# Patient Record
Sex: FEMALE | Race: WHITE | NOT HISPANIC OR LATINO | URBAN - METROPOLITAN AREA
[De-identification: names, ages, dates, MRNs, and addresses within clinical notes are randomized per-mention and may not be internally consistent; named-entity substitution may affect disease eponyms.]

---

## 2017-01-18 ENCOUNTER — OFFICE (AMBULATORY)
Dept: URBAN - METROPOLITAN AREA CLINIC 75 | Facility: CLINIC | Age: 67
End: 2017-01-18

## 2017-01-18 VITALS
WEIGHT: 160 LBS | DIASTOLIC BLOOD PRESSURE: 78 MMHG | HEIGHT: 63 IN | HEART RATE: 78 BPM | SYSTOLIC BLOOD PRESSURE: 124 MMHG

## 2017-01-18 DIAGNOSIS — K30 FUNCTIONAL DYSPEPSIA: ICD-10-CM

## 2017-01-18 DIAGNOSIS — K57.30 DIVERTICULOSIS OF LARGE INTESTINE WITHOUT PERFORATION OR ABS: ICD-10-CM

## 2017-01-18 DIAGNOSIS — K59.00 CONSTIPATION, UNSPECIFIED: ICD-10-CM

## 2017-01-18 DIAGNOSIS — D12.6 BENIGN NEOPLASM OF COLON, UNSPECIFIED: ICD-10-CM

## 2017-01-18 DIAGNOSIS — R93.3 ABNORMAL FINDINGS ON DIAGNOSTIC IMAGING OF OTHER PARTS OF DI: ICD-10-CM

## 2017-01-18 DIAGNOSIS — Z95.5 PRESENCE OF CORONARY ANGIOPLASTY IMPLANT AND GRAFT: ICD-10-CM

## 2017-01-18 DIAGNOSIS — Z79.82 LONG TERM (CURRENT) USE OF ASPIRIN: ICD-10-CM

## 2017-01-18 DIAGNOSIS — A04.9 BACTERIAL INTESTINAL INFECTION, UNSPECIFIED: ICD-10-CM

## 2017-01-18 DIAGNOSIS — M79.7 FIBROMYALGIA: ICD-10-CM

## 2017-01-18 DIAGNOSIS — R14.3 FLATULENCE: ICD-10-CM

## 2017-01-18 DIAGNOSIS — I25.10 ATHEROSCLEROTIC HEART DISEASE OF NATIVE CORONARY ARTERY WITH: ICD-10-CM

## 2017-01-18 PROCEDURE — 99213 OFFICE O/P EST LOW 20 MIN: CPT | Performed by: INTERNAL MEDICINE

## 2017-03-27 ENCOUNTER — OFFICE VISIT (OUTPATIENT)
Dept: CARDIOLOGY | Facility: CLINIC | Age: 67
End: 2017-03-27

## 2017-03-27 VITALS
WEIGHT: 161 LBS | BODY MASS INDEX: 27.49 KG/M2 | DIASTOLIC BLOOD PRESSURE: 89 MMHG | SYSTOLIC BLOOD PRESSURE: 133 MMHG | HEART RATE: 73 BPM | HEIGHT: 64 IN

## 2017-03-27 DIAGNOSIS — I25.10 CORONARY ARTERIOSCLEROSIS IN NATIVE ARTERY: ICD-10-CM

## 2017-03-27 DIAGNOSIS — R94.39 ABNORMAL STRESS ECG WITH TREADMILL: ICD-10-CM

## 2017-03-27 DIAGNOSIS — I10 BENIGN ESSENTIAL HYPERTENSION: Primary | ICD-10-CM

## 2017-03-27 PROCEDURE — 93000 ELECTROCARDIOGRAM COMPLETE: CPT | Performed by: INTERNAL MEDICINE

## 2017-03-27 RX ORDER — RANOLAZINE 500 MG/1
500 TABLET, EXTENDED RELEASE ORAL 2 TIMES DAILY
COMMUNITY
End: 2017-09-19 | Stop reason: SDUPTHER

## 2017-03-27 NOTE — PROGRESS NOTES
" Subjective:       Kristi Ibrahim is a 66 y.o. female who here for follow up    CC  CAD  HPI  66 his old white female with a history of the chest pain as well as benign essential arterial hypertension, does complains of tightness in chest on moderate to severe exertion, intermittent with relieved with rest lasting for a few minutes along with the weakness and shortness of breath     Problem List Items Addressed This Visit        Cardiovascular and Mediastinum    Benign essential hypertension - Primary    Coronary arteriosclerosis in native artery    Relevant Medications    ranolazine (RANEXA) 500 MG 12 hr tablet        Previous treatments/evaluations include: ASA. Cardiac risk factors: advanced age (older than 55 for men, 65 for women).    The following portions of the patient's history were reviewed and updated as appropriate: allergies, current medications, past family history, past medical history, past social history, past surgical history and problem list.    Past Medical History:   Diagnosis Date   • Coronary artery disease    • Hyperlipidemia    • Mitral valve insufficiency    • Tricuspid regurgitation     reports that she has never smoked. She has never used smokeless tobacco. She reports that she does not drink alcohol or use illicit drugs.History reviewed. No pertinent family history.    Review of Systems  Constitutional: No wt loss, fever, fatigue  Gastrointestinal: No nausea, abdominal pain  Behavioral/Psych: No insomnia or anxiety   Cardiovascular chest pain on moderate to severe exertion  Objective:       Physical Exam             Physical Exam  /89  Pulse 73  Ht 63.5\" (161.3 cm)  Wt 161 lb (73 kg)  BMI 28.07 kg/m2    General appearance: NAD, conversant   Eyes: anicteric sclerae, moist conjunctivae; no lid-lag; PERRLA   HENT: Atraumatic; oropharynx clear with moist mucous membranes and no mucosal ulcerations;  normal hard and soft palate   Neck: Trachea midline; FROM, supple, no thyromegaly or " lymphadenopathy   Lungs: CTA, with normal respiratory effort and no intercostal retractions   CV: S1-S2 regular, no murmurs, no rub, no gallop   Abdomen: Soft, non-tender; no masses or HSM   Extremities: No peripheral edema or extremity lymphadenopathy  Skin: Normal temperature, turgor and texture; no rash, ulcers or subcutaneous nodules   Psych: Appropriate affect, alert and oriented to person, place and time           Cardiographics  @  ECG 12 Lead  Date/Time: 3/27/2017 3:29 PM  Performed by: NAEL VERDIN  Authorized by: NAEL VERDIN   Comparison: compared with previous ECG   Similar to previous ECG  Rhythm: sinus rhythm  ST Flattening: all  Clinical impression: non-specific ECG            Echocardiogram:        Current Outpatient Prescriptions:   •  aspirin 81 MG tablet, Take  by mouth daily., Disp: , Rfl:   •  Calcium Carb-Cholecalciferol 600-200 MG-UNIT tablet, Take  by mouth., Disp: , Rfl:   •  Calcium Carb-Cholecalciferol 600-500 MG-UNIT capsule, , Disp: , Rfl:   •  ranolazine (RANEXA) 500 MG 12 hr tablet, Take 500 mg by mouth 2 (Two) Times a Day., Disp: , Rfl:   •  vitamin E 400 UNIT capsule, Take  by mouth., Disp: , Rfl:   •  zolpidem CR (AMBIEN CR) 12.5 MG CR tablet, Take 12.5 mg by mouth daily., Disp: , Rfl:   •  meclizine (ANTIVERT) 25 MG tablet, Take 25 mg by mouth daily., Disp: , Rfl:   •  nitroglycerin (NITROSTAT) 0.4 MG SL tablet, Place  under the tongue., Disp: , Rfl:   •  Omega 3 1000 MG capsule, Take  by mouth daily., Disp: , Rfl:    Assessment:        Patient Active Problem List   Diagnosis   • Abnormal cardiovascular stress test   • Benign essential hypertension   • Chest pain   • Coronary arteriosclerosis in native artery   • Presence of stent in coronary artery               Plan:         Clinically no signs and symptoms of angina or chf, no side effects with current meds,.    Continue current meds and treatment.    Importance of controlling hypertension and blood pressure   checkup on the regular basis has been explained  Hypertension as a silent killer has been discussed  Risk reduction of the weight and regular exercises to control the hypertension has been explained        ICD-10-CM ICD-9-CM   1. Benign essential hypertension I10 401.1   2. Coronary arteriosclerosis in native artery I25.10 414.01     CP WITH MODERATE TO HEAVY EXERTION    Considering the patient's symptoms as well as clinical situation and  EKG findings, along with cardiac risk factors, ischemic workup is necessary to rule out ischemic cardiomyopathy, stress induced arrhythmias, and functional capacity for diagnosis as well as prognostic consideration    Considering patient's medical condition as well as the risk factors, patient will require echocardiogram for further evaluation for the LV function, four-chamber evaluation, including the pressures, valvular function and  pericardial disease and pericardial effusion        SEE 1 YR  COUNSELING:    Kristi Reed was given to patient for the following topics: diagnostic results, risk factor reductions, impressions, risks and benefits of treatment options and importance of treatment compliance .       SMOKING COUNSELING:    Counseling given: Not Answered      EMR Dragon/Transcription disclaimer:   Much of this encounter note is an electronic transcription/translation of spoken language to printed text. The electronic translation of spoken language may permit erroneous, or at times, nonsensical words or phrases to be inadvertently transcribed; Although I have reviewed the note for such errors, some may still exist.

## 2017-05-15 ENCOUNTER — HOSPITAL ENCOUNTER (OUTPATIENT)
Dept: CARDIOLOGY | Facility: HOSPITAL | Age: 67
Discharge: HOME | End: 2017-05-15
Attending: INTERNAL MEDICINE | Admitting: INTERNAL MEDICINE

## 2017-05-15 ENCOUNTER — HOSPITAL ENCOUNTER (OUTPATIENT)
Dept: CARDIOLOGY | Facility: HOSPITAL | Age: 67
Discharge: HOME | End: 2017-05-15
Attending: INTERNAL MEDICINE

## 2017-05-15 VITALS
OXYGEN SATURATION: 98 % | RESPIRATION RATE: 16 BRPM | SYSTOLIC BLOOD PRESSURE: 128 MMHG | HEART RATE: 76 BPM | DIASTOLIC BLOOD PRESSURE: 80 MMHG

## 2017-05-15 VITALS
SYSTOLIC BLOOD PRESSURE: 127 MMHG | WEIGHT: 159.4 LBS | DIASTOLIC BLOOD PRESSURE: 78 MMHG | HEIGHT: 63 IN | HEART RATE: 80 BPM | BODY MASS INDEX: 28.24 KG/M2

## 2017-05-15 DIAGNOSIS — I25.10 CORONARY ARTERIOSCLEROSIS IN NATIVE ARTERY: ICD-10-CM

## 2017-05-15 DIAGNOSIS — I10 BENIGN ESSENTIAL HYPERTENSION: ICD-10-CM

## 2017-05-15 LAB
BH CV ECHO MEAS - ACS: 1.7 CM
BH CV ECHO MEAS - AO MAX PG (FULL): 2.4 MMHG
BH CV ECHO MEAS - AO MAX PG: 6.6 MMHG
BH CV ECHO MEAS - AO MEAN PG (FULL): 2 MMHG
BH CV ECHO MEAS - AO MEAN PG: 4 MMHG
BH CV ECHO MEAS - AO ROOT AREA (BSA CORRECTED): 1.6
BH CV ECHO MEAS - AO ROOT AREA: 6.2 CM^2
BH CV ECHO MEAS - AO ROOT DIAM: 2.8 CM
BH CV ECHO MEAS - AO V2 MAX: 128 CM/SEC
BH CV ECHO MEAS - AO V2 MEAN: 93.9 CM/SEC
BH CV ECHO MEAS - AO V2 VTI: 28.3 CM
BH CV ECHO MEAS - AVA(I,A): 2.2 CM^2
BH CV ECHO MEAS - AVA(I,D): 2.2 CM^2
BH CV ECHO MEAS - AVA(V,A): 2.3 CM^2
BH CV ECHO MEAS - AVA(V,D): 2.3 CM^2
BH CV ECHO MEAS - BSA(HAYCOCK): 1.8 M^2
BH CV ECHO MEAS - BSA: 1.8 M^2
BH CV ECHO MEAS - BZI_BMI: 28.2 KILOGRAMS/M^2
BH CV ECHO MEAS - BZI_DIASTOLIC_PRESSURE: 78 MMHG
BH CV ECHO MEAS - BZI_HEART_RATE: 80 BPM
BH CV ECHO MEAS - BZI_METRIC_HEIGHT: 160 CM
BH CV ECHO MEAS - BZI_METRIC_WEIGHT: 72.2 KG
BH CV ECHO MEAS - BZI_SYSTOLIC_PRESSURE: 127 MMHG
BH CV ECHO MEAS - CONTRAST EF 4CH: 62.3 ML/M^2
BH CV ECHO MEAS - EDV(CUBED): 85.2 ML
BH CV ECHO MEAS - EDV(MOD-SP4): 61 ML
BH CV ECHO MEAS - EDV(TEICH): 87.7 ML
BH CV ECHO MEAS - EF(CUBED): 64.3 %
BH CV ECHO MEAS - EF(MOD-SP4): 62.3 %
BH CV ECHO MEAS - EF(TEICH): 56.1 %
BH CV ECHO MEAS - ESV(CUBED): 30.4 ML
BH CV ECHO MEAS - ESV(MOD-SP4): 23 ML
BH CV ECHO MEAS - ESV(TEICH): 38.5 ML
BH CV ECHO MEAS - FS: 29.1 %
BH CV ECHO MEAS - IVS/LVPW: 0.92
BH CV ECHO MEAS - IVSD: 1.1 CM
BH CV ECHO MEAS - LA DIMENSION: 2.9 CM
BH CV ECHO MEAS - LA/AO: 1
BH CV ECHO MEAS - LAT PEAK E' VEL: 9 CM/SEC
BH CV ECHO MEAS - LV DIASTOLIC VOL/BSA (35-75): 34.8 ML/M^2
BH CV ECHO MEAS - LV MASS(C)D: 180 GRAMS
BH CV ECHO MEAS - LV MASS(C)DI: 102.5 GRAMS/M^2
BH CV ECHO MEAS - LV MAX PG: 4.2 MMHG
BH CV ECHO MEAS - LV MEAN PG: 2 MMHG
BH CV ECHO MEAS - LV SYSTOLIC VOL/BSA (12-30): 13.1 ML/M^2
BH CV ECHO MEAS - LV V1 MAX: 102 CM/SEC
BH CV ECHO MEAS - LV V1 MEAN: 72.9 CM/SEC
BH CV ECHO MEAS - LV V1 VTI: 21.8 CM
BH CV ECHO MEAS - LVIDD: 4.4 CM
BH CV ECHO MEAS - LVIDS: 3.1 CM
BH CV ECHO MEAS - LVLD AP4: 6.6 CM
BH CV ECHO MEAS - LVLS AP4: 5.7 CM
BH CV ECHO MEAS - LVOT AREA (M): 2.8 CM^2
BH CV ECHO MEAS - LVOT AREA: 2.8 CM^2
BH CV ECHO MEAS - LVOT DIAM: 1.9 CM
BH CV ECHO MEAS - LVPWD: 1.2 CM
BH CV ECHO MEAS - MED PEAK E' VEL: 6 CM/SEC
BH CV ECHO MEAS - MR MAX PG: 94.1 MMHG
BH CV ECHO MEAS - MR MAX VEL: 485 CM/SEC
BH CV ECHO MEAS - MR MEAN PG: 68 MMHG
BH CV ECHO MEAS - MR MEAN VEL: 391 CM/SEC
BH CV ECHO MEAS - MR VTI: 189 CM
BH CV ECHO MEAS - MV A DUR: 0.15 SEC
BH CV ECHO MEAS - MV A MAX VEL: 91.3 CM/SEC
BH CV ECHO MEAS - MV DEC SLOPE: 278 CM/SEC^2
BH CV ECHO MEAS - MV DEC TIME: 0.23 SEC
BH CV ECHO MEAS - MV E MAX VEL: 61.7 CM/SEC
BH CV ECHO MEAS - MV E/A: 0.68
BH CV ECHO MEAS - MV MAX PG: 3.4 MMHG
BH CV ECHO MEAS - MV MEAN PG: 1 MMHG
BH CV ECHO MEAS - MV P1/2T MAX VEL: 74 CM/SEC
BH CV ECHO MEAS - MV P1/2T: 78 MSEC
BH CV ECHO MEAS - MV V2 MAX: 92.5 CM/SEC
BH CV ECHO MEAS - MV V2 MEAN: 50.5 CM/SEC
BH CV ECHO MEAS - MV V2 VTI: 20.9 CM
BH CV ECHO MEAS - MVA P1/2T LCG: 3 CM^2
BH CV ECHO MEAS - MVA(P1/2T): 2.8 CM^2
BH CV ECHO MEAS - MVA(VTI): 3 CM^2
BH CV ECHO MEAS - PA MAX PG: 2.5 MMHG
BH CV ECHO MEAS - PA MEAN PG: 1 MMHG
BH CV ECHO MEAS - PA V2 MAX: 77.2 CM/SEC
BH CV ECHO MEAS - PA V2 MEAN: 42.1 CM/SEC
BH CV ECHO MEAS - PA V2 VTI: 13.3 CM
BH CV ECHO MEAS - PULM A REVS DUR: 0.11 SEC
BH CV ECHO MEAS - PULM A REVS VEL: 37.5 CM/SEC
BH CV ECHO MEAS - PULM DIAS VEL: 41 CM/SEC
BH CV ECHO MEAS - PULM S/D: 1.8
BH CV ECHO MEAS - PULM SYS VEL: 73.5 CM/SEC
BH CV ECHO MEAS - RAP SYSTOLE: 10 MMHG
BH CV ECHO MEAS - RVSP: 34.2 MMHG
BH CV ECHO MEAS - SI(AO): 99.3 ML/M^2
BH CV ECHO MEAS - SI(CUBED): 31.2 ML/M^2
BH CV ECHO MEAS - SI(LVOT): 35.2 ML/M^2
BH CV ECHO MEAS - SI(MOD-SP4): 21.6 ML/M^2
BH CV ECHO MEAS - SI(TEICH): 28 ML/M^2
BH CV ECHO MEAS - SV(AO): 174.3 ML
BH CV ECHO MEAS - SV(CUBED): 54.8 ML
BH CV ECHO MEAS - SV(LVOT): 61.8 ML
BH CV ECHO MEAS - SV(MOD-SP4): 38 ML
BH CV ECHO MEAS - SV(TEICH): 49.2 ML
BH CV ECHO MEAS - TAPSE (>1.6): 1.5 CM2
BH CV ECHO MEAS - TR MAX VEL: 246 CM/SEC
BH CV XLRA - RV BASE: 2.9 CM
BH CV XLRA - RV LENGTH: 5.9 CM
BH CV XLRA - RV MID: 2.9 CM
E/E' RATIO: 9
LEFT ATRIUM VOLUME INDEX: 29 ML/M2

## 2017-05-15 PROCEDURE — 93306 TTE W/DOPPLER COMPLETE: CPT | Performed by: INTERNAL MEDICINE

## 2017-05-15 PROCEDURE — 93018 CV STRESS TEST I&R ONLY: CPT | Performed by: INTERNAL MEDICINE

## 2017-05-15 PROCEDURE — 93017 CV STRESS TEST TRACING ONLY: CPT

## 2017-05-15 PROCEDURE — 93306 TTE W/DOPPLER COMPLETE: CPT

## 2017-05-15 PROCEDURE — 93016 CV STRESS TEST SUPVJ ONLY: CPT | Performed by: INTERNAL MEDICINE

## 2017-05-16 LAB
BH CV STRESS BP STAGE 1: NORMAL
BH CV STRESS BP STAGE 2: NORMAL
BH CV STRESS BP STAGE 3: NORMAL
BH CV STRESS BP STAGE 4: NORMAL
BH CV STRESS BP STAGE 5: NORMAL
BH CV STRESS DURATION MIN STAGE 1: 3
BH CV STRESS DURATION MIN STAGE 2: 3
BH CV STRESS DURATION MIN STAGE 3: 1
BH CV STRESS DURATION MIN STAGE 4: 2
BH CV STRESS DURATION MIN STAGE 5: 1
BH CV STRESS DURATION SEC STAGE 1: 0
BH CV STRESS DURATION SEC STAGE 2: 0
BH CV STRESS DURATION SEC STAGE 3: 34
BH CV STRESS DURATION SEC STAGE 4: 30
BH CV STRESS DURATION SEC STAGE 5: 14
BH CV STRESS GRADE STAGE 1: 0
BH CV STRESS GRADE STAGE 2: 5
BH CV STRESS GRADE STAGE 3: 10
BH CV STRESS GRADE STAGE 4: 10
BH CV STRESS GRADE STAGE 5: 10
BH CV STRESS HR STAGE 1: 103
BH CV STRESS HR STAGE 2: 1110
BH CV STRESS HR STAGE 3: 123
BH CV STRESS HR STAGE 4: 133
BH CV STRESS HR STAGE 5: 144
BH CV STRESS METS STAGE 1: 2.3
BH CV STRESS METS STAGE 2: 3.4
BH CV STRESS METS STAGE 3: 4.5
BH CV STRESS METS STAGE 4: 6.3
BH CV STRESS METS STAGE 5: 7.1
BH CV STRESS O2 STAGE 4: 98
BH CV STRESS O2 STAGE 5: 98
BH CV STRESS PROTOCOL 1: NORMAL
BH CV STRESS RECOVERY BP: NORMAL MMHG
BH CV STRESS RECOVERY HR: 88 BPM
BH CV STRESS RECOVERY O2: 98 %
BH CV STRESS SPEED STAGE 1: 1.7
BH CV STRESS SPEED STAGE 2: 1.7
BH CV STRESS SPEED STAGE 3: 1.7
BH CV STRESS SPEED STAGE 4: 2.5
BH CV STRESS SPEED STAGE 5: 2.9
BH CV STRESS STAGE 1: 0
BH CV STRESS STAGE 2: NORMAL
BH CV STRESS STAGE 3: 1
BH CV STRESS STAGE 4: 2
BH CV STRESS STAGE 5: 3
MAXIMAL PREDICTED HEART RATE: 154 BPM
PERCENT MAX PREDICTED HR: 93.51 %
STRESS BASELINE BP: NORMAL MMHG
STRESS BASELINE HR: 85 BPM
STRESS O2 SAT REST: 98 %
STRESS PERCENT HR: 110 %
STRESS POST ESTIMATED WORKLOAD: 7.1 METS
STRESS POST EXERCISE DUR MIN: 11 MIN
STRESS POST EXERCISE DUR SEC: 44 SEC
STRESS POST O2 SAT PEAK: 98 %
STRESS POST PEAK BP: NORMAL MMHG
STRESS POST PEAK HR: 144 BPM
STRESS TARGET HR: 131 BPM

## 2017-05-18 RX ORDER — NITROGLYCERIN 0.4 MG/1
0.4 TABLET SUBLINGUAL
Qty: 25 TABLET | Refills: 5 | Status: SHIPPED | OUTPATIENT
Start: 2017-05-18

## 2017-06-19 ENCOUNTER — HOSPITAL ENCOUNTER (OUTPATIENT)
Dept: CARDIOLOGY | Facility: HOSPITAL | Age: 67
Discharge: HOME | End: 2017-06-19
Attending: INTERNAL MEDICINE

## 2017-06-19 VITALS
HEART RATE: 70 BPM | BODY MASS INDEX: 27.49 KG/M2 | WEIGHT: 161 LBS | HEIGHT: 64 IN | DIASTOLIC BLOOD PRESSURE: 70 MMHG | SYSTOLIC BLOOD PRESSURE: 120 MMHG

## 2017-06-19 DIAGNOSIS — I10 BENIGN ESSENTIAL HYPERTENSION: ICD-10-CM

## 2017-06-19 DIAGNOSIS — I25.10 CORONARY ARTERIOSCLEROSIS IN NATIVE ARTERY: ICD-10-CM

## 2017-06-19 DIAGNOSIS — R94.39 ABNORMAL STRESS ECG WITH TREADMILL: ICD-10-CM

## 2017-06-19 LAB
BH CV STRESS BP STAGE 1: NORMAL
BH CV STRESS BP STAGE 2: NORMAL
BH CV STRESS BP STAGE 3: NORMAL
BH CV STRESS BP STAGE 4: NORMAL
BH CV STRESS DURATION MIN STAGE 1: 3
BH CV STRESS DURATION MIN STAGE 2: 3
BH CV STRESS DURATION MIN STAGE 3: 3
BH CV STRESS DURATION MIN STAGE 4: 3
BH CV STRESS DURATION SEC STAGE 1: 0
BH CV STRESS DURATION SEC STAGE 2: 0
BH CV STRESS DURATION SEC STAGE 3: 0
BH CV STRESS DURATION SEC STAGE 4: 0
BH CV STRESS GRADE STAGE 1: 0
BH CV STRESS GRADE STAGE 2: 5
BH CV STRESS GRADE STAGE 3: 10
BH CV STRESS GRADE STAGE 4: 12
BH CV STRESS HR STAGE 1: 101
BH CV STRESS HR STAGE 2: 114
BH CV STRESS HR STAGE 3: 127
BH CV STRESS HR STAGE 4: 139
BH CV STRESS METS STAGE 1: 2.3
BH CV STRESS METS STAGE 2: 3.5
BH CV STRESS METS STAGE 3: 4.6
BH CV STRESS METS STAGE 4: 7.1
BH CV STRESS PROTOCOL 1: NORMAL
BH CV STRESS RECOVERY BP: NORMAL MMHG
BH CV STRESS RECOVERY HR: 88 BPM
BH CV STRESS SPEED STAGE 1: 1.7
BH CV STRESS SPEED STAGE 2: 1.7
BH CV STRESS SPEED STAGE 3: 1.7
BH CV STRESS SPEED STAGE 4: 1.7
BH CV STRESS STAGE 1: 1
BH CV STRESS STAGE 2: 2
BH CV STRESS STAGE 3: 3
BH CV STRESS STAGE 4: 4
BH CV STRESS STAGE 5: 5
LV EF NUC BP: 75 %
MAXIMAL PREDICTED HEART RATE: 153 BPM
PERCENT MAX PREDICTED HR: 91.5 %
STRESS BASELINE BP: NORMAL MMHG
STRESS BASELINE HR: 73 BPM
STRESS PERCENT HR: 108 %
STRESS POST ESTIMATED WORKLOAD: 7.2 METS
STRESS POST EXERCISE DUR MIN: 12 MIN
STRESS POST EXERCISE DUR SEC: 0 SEC
STRESS POST PEAK BP: NORMAL MMHG
STRESS POST PEAK HR: 140 BPM
STRESS TARGET HR: 130 BPM

## 2017-06-19 PROCEDURE — 0 TECHNETIUM SESTAMIBI: Performed by: INTERNAL MEDICINE

## 2017-06-19 PROCEDURE — 93017 CV STRESS TEST TRACING ONLY: CPT

## 2017-06-19 PROCEDURE — 93018 CV STRESS TEST I&R ONLY: CPT | Performed by: INTERNAL MEDICINE

## 2017-06-19 PROCEDURE — 78452 HT MUSCLE IMAGE SPECT MULT: CPT

## 2017-06-19 PROCEDURE — A9500 TC99M SESTAMIBI: HCPCS | Performed by: INTERNAL MEDICINE

## 2017-06-19 PROCEDURE — 93016 CV STRESS TEST SUPVJ ONLY: CPT | Performed by: INTERNAL MEDICINE

## 2017-06-19 PROCEDURE — 78452 HT MUSCLE IMAGE SPECT MULT: CPT | Performed by: INTERNAL MEDICINE

## 2017-06-19 RX ADMIN — Medication 1 DOSE: at 07:34

## 2017-06-19 RX ADMIN — Medication 1 DOSE: at 09:04

## 2017-07-03 ENCOUNTER — OFFICE VISIT (OUTPATIENT)
Dept: CARDIOLOGY | Facility: CLINIC | Age: 67
End: 2017-07-03

## 2017-07-03 VITALS
HEART RATE: 72 BPM | BODY MASS INDEX: 27.31 KG/M2 | HEIGHT: 64 IN | DIASTOLIC BLOOD PRESSURE: 83 MMHG | SYSTOLIC BLOOD PRESSURE: 123 MMHG | WEIGHT: 160 LBS

## 2017-07-03 DIAGNOSIS — I10 BENIGN ESSENTIAL HYPERTENSION: Primary | ICD-10-CM

## 2017-07-03 DIAGNOSIS — I25.10 CORONARY ARTERIOSCLEROSIS IN NATIVE ARTERY: ICD-10-CM

## 2017-07-03 PROCEDURE — 99212 OFFICE O/P EST SF 10 MIN: CPT | Performed by: INTERNAL MEDICINE

## 2017-07-03 NOTE — PROGRESS NOTES
" Subjective:       Kristi Ibrahim is a 67 y.o. female who here for follow up    CC  CAD,   HPI  67-year-old female who is here for follow-up after the stress test and echocardiogram done last month denies any chest pains or tightness in chest no heaviness with a pressure sensation     Problem List Items Addressed This Visit        Cardiovascular and Mediastinum    Benign essential hypertension - Primary    Coronary arteriosclerosis in native artery        .    The following portions of the patient's history were reviewed and updated as appropriate: allergies, current medications, past family history, past medical history, past social history, past surgical history and problem list.    Past Medical History:   Diagnosis Date   • Coronary artery disease    • Hyperlipidemia    • Mitral valve insufficiency    • Tricuspid regurgitation     reports that she has never smoked. She has never used smokeless tobacco. She reports that she does not drink alcohol or use illicit drugs.  Family History   Problem Relation Age of Onset   • Heart disease Mother    • Stroke Mother    • Heart disease Father    • Hypertension Father    • Heart disease Brother    • Hypertension Brother        Review of Systems  Constitutional: No wt loss, fever, fatigue  Gastrointestinal: No nausea, abdominal pain  Behavioral/Psych: No insomnia or anxiety   Cardiovascular No chest pains or tightness in chest  Objective:       Physical Exam             Physical Exam  /83  Pulse 72  Ht 63.5\" (161.3 cm)  Wt 160 lb (72.6 kg)  BMI 27.9 kg/m2    General appearance: NAD, conversant   Eyes: anicteric sclerae, moist conjunctivae; no lid-lag; PERRLA   HENT: Atraumatic; oropharynx clear with moist mucous membranes and no mucosal ulcerations;  normal hard and soft palate   Neck: Trachea midline; FROM, supple, no thyromegaly or lymphadenopathy   Lungs: CTA, with normal respiratory effort and no intercostal retractions   CV: S1-S2 regular, no murmurs, no rub, no " gallop   Abdomen: Soft, non-tender; no masses or HSM   Extremities: No peripheral edema or extremity lymphadenopathy  Skin: Normal temperature, turgor and texture; no rash, ulcers or subcutaneous nodules   Psych: Appropriate affect, alert and oriented to person, place and time           Cardiographics  @Procedures    Echocardiogram:        Current Outpatient Prescriptions:   •  aspirin 81 MG tablet, Take  by mouth daily., Disp: , Rfl:   •  Calcium Carb-Cholecalciferol 600-200 MG-UNIT tablet, Take  by mouth., Disp: , Rfl:   •  meclizine (ANTIVERT) 25 MG tablet, Take 25 mg by mouth daily., Disp: , Rfl:   •  nitroglycerin (NITROSTAT) 0.4 MG SL tablet, Place 1 tablet under the tongue Every 5 (Five) Minutes As Needed for Chest Pain., Disp: 25 tablet, Rfl: 5  •  Omega 3 1000 MG capsule, Take  by mouth daily., Disp: , Rfl:   •  ranolazine (RANEXA) 500 MG 12 hr tablet, Take 500 mg by mouth 2 (Two) Times a Day., Disp: , Rfl:   •  vitamin E 400 UNIT capsule, Take  by mouth., Disp: , Rfl:   •  zolpidem CR (AMBIEN CR) 12.5 MG CR tablet, Take 12.5 mg by mouth daily., Disp: , Rfl:    Assessment:        Patient Active Problem List   Diagnosis   • Abnormal cardiovascular stress test   • Benign essential hypertension   • Chest pain   • Coronary arteriosclerosis in native artery   • Presence of stent in coronary artery     Interpretation Summary   · Findings consistent with an abnormal ECG stress test.  · Left ventricular ejection fraction is hyperdynamic (Calculated EF > 70%).  · Myocardial perfusion imaging indicates a normal myocardial perfusion study with no evidence of ischemia.  · Impressions are consistent with a low risk study.               Plan:            ICD-10-CM ICD-9-CM   1. Benign essential hypertension I10 401.1   2. Coronary arteriosclerosis in native artery I25.10 414.01     1. Benign essential hypertension  Well-controlled with current medications    2. Coronary arteriosclerosis in native artery  *Alert was  negative    STRESS TEST OK    SEE US 1 YR  COUNSELING:    Kristi Reed was given to patient for the following topics: diagnostic results, risk factor reductions, impressions, risks and benefits of treatment options and importance of treatment compliance .       SMOKING COUNSELING:    Counseling given: Not Answered      EMR Dragon/Transcription disclaimer:   Much of this encounter note is an electronic transcription/translation of spoken language to printed text. The electronic translation of spoken language may permit erroneous, or at times, nonsensical words or phrases to be inadvertently transcribed; Although I have reviewed the note for such errors, some may still exist.

## 2017-09-07 VITALS
OXYGEN SATURATION: 99 % | OXYGEN SATURATION: 97 % | SYSTOLIC BLOOD PRESSURE: 137 MMHG | DIASTOLIC BLOOD PRESSURE: 56 MMHG | HEART RATE: 71 BPM | HEART RATE: 82 BPM | SYSTOLIC BLOOD PRESSURE: 100 MMHG | DIASTOLIC BLOOD PRESSURE: 66 MMHG | HEART RATE: 77 BPM | OXYGEN SATURATION: 98 % | RESPIRATION RATE: 18 BRPM | DIASTOLIC BLOOD PRESSURE: 75 MMHG | DIASTOLIC BLOOD PRESSURE: 79 MMHG | HEART RATE: 79 BPM | HEIGHT: 63 IN | WEIGHT: 160 LBS | RESPIRATION RATE: 16 BRPM | DIASTOLIC BLOOD PRESSURE: 57 MMHG | SYSTOLIC BLOOD PRESSURE: 74 MMHG | RESPIRATION RATE: 10 BRPM | TEMPERATURE: 97.9 F | DIASTOLIC BLOOD PRESSURE: 73 MMHG | OXYGEN SATURATION: 96 % | HEART RATE: 84 BPM | RESPIRATION RATE: 11 BRPM | SYSTOLIC BLOOD PRESSURE: 92 MMHG | HEART RATE: 74 BPM | DIASTOLIC BLOOD PRESSURE: 63 MMHG | DIASTOLIC BLOOD PRESSURE: 46 MMHG | DIASTOLIC BLOOD PRESSURE: 47 MMHG | SYSTOLIC BLOOD PRESSURE: 124 MMHG | SYSTOLIC BLOOD PRESSURE: 81 MMHG | RESPIRATION RATE: 20 BRPM | SYSTOLIC BLOOD PRESSURE: 116 MMHG | HEART RATE: 78 BPM | SYSTOLIC BLOOD PRESSURE: 96 MMHG | OXYGEN SATURATION: 95 % | SYSTOLIC BLOOD PRESSURE: 95 MMHG | DIASTOLIC BLOOD PRESSURE: 39 MMHG | RESPIRATION RATE: 13 BRPM | OXYGEN SATURATION: 100 % | SYSTOLIC BLOOD PRESSURE: 80 MMHG | TEMPERATURE: 98.2 F | SYSTOLIC BLOOD PRESSURE: 126 MMHG | DIASTOLIC BLOOD PRESSURE: 55 MMHG

## 2017-09-11 ENCOUNTER — OFFICE (AMBULATORY)
Dept: URBAN - METROPOLITAN AREA CLINIC 64 | Facility: CLINIC | Age: 67
End: 2017-09-11

## 2017-09-11 ENCOUNTER — AMBULATORY SURGICAL CENTER (AMBULATORY)
Dept: URBAN - METROPOLITAN AREA SURGERY 17 | Facility: SURGERY | Age: 67
End: 2017-09-11
Payer: COMMERCIAL

## 2017-09-11 DIAGNOSIS — D12.2 BENIGN NEOPLASM OF ASCENDING COLON: ICD-10-CM

## 2017-09-11 DIAGNOSIS — K63.89 OTHER SPECIFIED DISEASES OF INTESTINE: ICD-10-CM

## 2017-09-11 DIAGNOSIS — D12.3 BENIGN NEOPLASM OF TRANSVERSE COLON: ICD-10-CM

## 2017-09-11 DIAGNOSIS — Z86.010 PERSONAL HISTORY OF COLONIC POLYPS: ICD-10-CM

## 2017-09-11 DIAGNOSIS — K57.30 DIVERTICULOSIS OF LARGE INTESTINE WITHOUT PERFORATION OR ABS: ICD-10-CM

## 2017-09-11 LAB
GI HISTOLOGY: A. UNSPECIFIED: (no result)
GI HISTOLOGY: B. UNSPECIFIED: (no result)
GI HISTOLOGY: PDF REPORT: (no result)

## 2017-09-11 PROCEDURE — 45380 COLONOSCOPY AND BIOPSY: CPT | Mod: PT | Performed by: INTERNAL MEDICINE

## 2017-09-11 PROCEDURE — 88305 TISSUE EXAM BY PATHOLOGIST: CPT | Performed by: INTERNAL MEDICINE

## 2017-09-11 RX ADMIN — PROPOFOL 100 MG: 10 INJECTION, EMULSION INTRAVENOUS at 09:06

## 2017-09-11 RX ADMIN — LIDOCAINE HYDROCHLORIDE 25 MG: 10 INJECTION, SOLUTION EPIDURAL; INFILTRATION; INTRACAUDAL; PERINEURAL at 09:06

## 2017-09-11 RX ADMIN — PROPOFOL 25 MG: 10 INJECTION, EMULSION INTRAVENOUS at 09:26

## 2017-09-11 RX ADMIN — PROPOFOL 50 MG: 10 INJECTION, EMULSION INTRAVENOUS at 09:10

## 2017-09-11 RX ADMIN — PROPOFOL 25 MG: 10 INJECTION, EMULSION INTRAVENOUS at 09:15

## 2017-09-11 RX ADMIN — PROPOFOL 50 MG: 10 INJECTION, EMULSION INTRAVENOUS at 09:21

## 2017-09-19 RX ORDER — RANOLAZINE 500 MG/1
500 TABLET, EXTENDED RELEASE ORAL 2 TIMES DAILY
Qty: 180 TABLET | Refills: 1 | Status: SHIPPED | OUTPATIENT
Start: 2017-09-19 | End: 2017-09-20 | Stop reason: SDUPTHER

## 2017-09-20 RX ORDER — RANOLAZINE 500 MG/1
500 TABLET, EXTENDED RELEASE ORAL 2 TIMES DAILY
Qty: 180 TABLET | Refills: 1 | Status: SHIPPED | OUTPATIENT
Start: 2017-09-20 | End: 2018-02-26 | Stop reason: SDUPTHER

## 2018-02-26 RX ORDER — RANOLAZINE 500 MG/1
TABLET, FILM COATED, EXTENDED RELEASE ORAL
Qty: 180 TABLET | Refills: 1 | Status: SHIPPED | OUTPATIENT
Start: 2018-02-26 | End: 2018-04-02 | Stop reason: ALTCHOICE

## 2018-04-02 ENCOUNTER — OFFICE VISIT (OUTPATIENT)
Dept: CARDIOLOGY | Facility: CLINIC | Age: 68
End: 2018-04-02

## 2018-04-02 VITALS
WEIGHT: 166 LBS | DIASTOLIC BLOOD PRESSURE: 84 MMHG | HEIGHT: 64 IN | SYSTOLIC BLOOD PRESSURE: 130 MMHG | HEART RATE: 84 BPM | BODY MASS INDEX: 28.34 KG/M2

## 2018-04-02 DIAGNOSIS — I25.10 CORONARY ARTERIOSCLEROSIS IN NATIVE ARTERY: ICD-10-CM

## 2018-04-02 DIAGNOSIS — I10 BENIGN ESSENTIAL HYPERTENSION: ICD-10-CM

## 2018-04-02 DIAGNOSIS — R07.2 PRECORDIAL PAIN: Primary | ICD-10-CM

## 2018-04-02 PROCEDURE — 93000 ELECTROCARDIOGRAM COMPLETE: CPT | Performed by: INTERNAL MEDICINE

## 2018-04-02 PROCEDURE — 99214 OFFICE O/P EST MOD 30 MIN: CPT | Performed by: INTERNAL MEDICINE

## 2018-04-02 RX ORDER — BISOPROLOL FUMARATE AND HYDROCHLOROTHIAZIDE 5; 6.25 MG/1; MG/1
1 TABLET ORAL DAILY
Qty: 30 TABLET | Refills: 6 | Status: SHIPPED | OUTPATIENT
Start: 2018-04-02 | End: 2018-04-16 | Stop reason: SDUPTHER

## 2018-04-02 NOTE — PROGRESS NOTES
" Subjective:       Kristi Ibrahim is a 67 y.o. female who here for follow up    CC  SHARP PAIN CP, WITH SOB, FATIGUE  ON EXERCISE, GRADUALLY WORSENING    CHEST TIGHTNESS  HPI  67-year-old female with known history of hypertension coronary artery disease has been complaining of sharp pain in chest, moderate in intensity associated with the shortness of breath fatigue usually on exercise gradually worsening     Problem List Items Addressed This Visit        Cardiovascular and Mediastinum    Benign essential hypertension    Relevant Medications    bisoprolol-hydrochlorothiazide (ZIAC) 5-6.25 MG per tablet    Coronary arteriosclerosis in native artery       Nervous and Auditory    Chest pain - Primary    Relevant Orders    ECG 12 Lead    Treadmill Stress Test      Other Visit Diagnoses    None.       .    The following portions of the patient's history were reviewed and updated as appropriate: allergies, current medications, past family history, past medical history, past social history, past surgical history and problem list.    Past Medical History:   Diagnosis Date   • Coronary artery disease    • Hyperlipidemia    • Mitral valve insufficiency    • Tricuspid regurgitation     reports that she has never smoked. She has never used smokeless tobacco. She reports that she does not drink alcohol or use drugs.  Family History   Problem Relation Age of Onset   • Heart disease Mother    • Stroke Mother    • Heart disease Father    • Hypertension Father    • Heart disease Brother    • Hypertension Brother        Review of Systems  Constitutional: No wt loss, fever, fatigue  Gastrointestinal: No nausea, abdominal pain  Behavioral/Psych: No insomnia or anxiety   Cardiovascular Chest pain  Objective:       Physical Exam           Physical Exam  /84   Pulse 84   Ht 161.3 cm (63.5\")   Wt 75.3 kg (166 lb)   BMI 28.94 kg/m²     General appearance: NAD, conversant   Eyes: anicteric sclerae, moist conjunctivae; no lid-lag; " PERRLA   HENT: Atraumatic; oropharynx clear with moist mucous membranes and no mucosal ulcerations;  normal hard and soft palate   Neck: Trachea midline; FROM, supple, no thyromegaly or lymphadenopathy   Lungs: CTA, with normal respiratory effort and no intercostal retractions   CV: S1-S2 regular, no murmurs, no rub, no gallop   Abdomen: Soft, non-tender; no masses or HSM   Extremities: No peripheral edema or extremity lymphadenopathy  Skin: Normal temperature, turgor and texture; no rash, ulcers or subcutaneous nodules   Psych: Appropriate affect, alert and oriented to person, place and time           Cardiographics  @  ECG 12 Lead  Date/Time: 4/2/2018 1:24 PM  Performed by: NAEL VERDIN  Authorized by: NAEL VERDIN   Comparison: compared with previous ECG   Similar to previous ECG  Rhythm: sinus rhythm  ST Flattening: all  Clinical impression: non-specific ECG            Echocardiogram:        Current Outpatient Prescriptions:   •  aspirin 81 MG tablet, Take  by mouth daily., Disp: , Rfl:   •  Calcium Carb-Cholecalciferol 600-200 MG-UNIT tablet, Take  by mouth., Disp: , Rfl:   •  meclizine (ANTIVERT) 25 MG tablet, Take 25 mg by mouth daily., Disp: , Rfl:   •  Omega 3 1000 MG capsule, Take  by mouth daily., Disp: , Rfl:   •  RANEXA 500 MG 12 hr tablet, TAKE 1 TABLET TWICE A DAY, Disp: 180 tablet, Rfl: 1  •  vitamin E 400 UNIT capsule, Take  by mouth., Disp: , Rfl:   •  zolpidem CR (AMBIEN CR) 12.5 MG CR tablet, Take 12.5 mg by mouth daily., Disp: , Rfl:   •  nitroglycerin (NITROSTAT) 0.4 MG SL tablet, Place 1 tablet under the tongue Every 5 (Five) Minutes As Needed for Chest Pain., Disp: 25 tablet, Rfl: 5   Assessment:        Patient Active Problem List   Diagnosis   • Abnormal cardiovascular stress test   • Benign essential hypertension   • Chest pain   • Coronary arteriosclerosis in native artery   • Presence of stent in coronary artery     IMPRESSION OF HEART CATHETERIZATION:    1. Normal left  main coronary artery.  2. Normal left anterior descending artery and its branches with proximal   stent widely patent  3. Normal left circumflex artery and its branches.  4. Normal right coronary artery.  5. Normal left ventricular systolic function.  LVEDP 10 mmHg.  Ejection   fraction 60 %.    RECOMMENDATION:  Considering LAD stent widely patent with no other   coronary artery stenosis patient will be treated medically.   Result Narrative               Plan:            ICD-10-CM ICD-9-CM   1. Precordial pain R07.2 786.51   2. Coronary arteriosclerosis in native artery I25.10 414.01   3. Benign essential hypertension I10 401.1     1. Precordial pain  Considering the patient's symptoms as well as clinical situation and  EKG findings, along with cardiac risk factors, ischemic workup is necessary to rule out ischemic cardiomyopathy, stress induced arrhythmias, and functional capacity for diagnosis as well as prognostic consideration    Considering patient's medical condition as well as the risk factors, patient will require echocardiogram for further evaluation for the LV function, four-chamber evaluation, including the pressures, valvular function and  pericardial disease and pericardial effusion    - ECG 12 Lead  - Treadmill Stress Test    2. Coronary arteriosclerosis in native artery      3. Benign essential hypertension  Blood pressure remains high will add beta blockers       DC RENEXA    START ZIAC 5/ 6.25 MG PO DAILY    Pros and cons of this new medication / change medication has been explained to  the patient    Possible side effects has been explained    Associated need of the blood  Work has been explained    Need for the compliance of the medication has been explained      SEE IN 2 WKS WITH ETT  COUNSELING:    Kristi Reed was given to patient for the following topics: diagnostic results, risk factor reductions, impressions, risks and benefits of treatment options and importance of treatment  compliance .       SMOKING COUNSELING:    Counseling given: Not Answered      EMR Dragon/Transcription disclaimer:   Much of this encounter note is an electronic transcription/translation of spoken language to printed text. The electronic translation of spoken language may permit erroneous, or at times, nonsensical words or phrases to be inadvertently transcribed; Although I have reviewed the note for such errors, some may still exist.

## 2018-04-16 ENCOUNTER — HOSPITAL ENCOUNTER (OUTPATIENT)
Dept: CARDIOLOGY | Facility: HOSPITAL | Age: 68
Discharge: HOME | End: 2018-04-16

## 2018-04-16 ENCOUNTER — HOSPITAL ENCOUNTER (OUTPATIENT)
Dept: CARDIOLOGY | Facility: HOSPITAL | Age: 68
Discharge: HOME | End: 2018-04-16
Attending: INTERNAL MEDICINE | Admitting: INTERNAL MEDICINE

## 2018-04-16 ENCOUNTER — OFFICE VISIT (OUTPATIENT)
Dept: CARDIOLOGY | Facility: CLINIC | Age: 68
End: 2018-04-16

## 2018-04-16 VITALS
OXYGEN SATURATION: 96 % | BODY MASS INDEX: 28.17 KG/M2 | HEART RATE: 75 BPM | SYSTOLIC BLOOD PRESSURE: 93 MMHG | DIASTOLIC BLOOD PRESSURE: 62 MMHG | WEIGHT: 165 LBS | HEIGHT: 64 IN

## 2018-04-16 VITALS — SYSTOLIC BLOOD PRESSURE: 104 MMHG | DIASTOLIC BLOOD PRESSURE: 80 MMHG

## 2018-04-16 DIAGNOSIS — Z95.5 PRESENCE OF STENT IN CORONARY ARTERY: ICD-10-CM

## 2018-04-16 DIAGNOSIS — R07.2 PRECORDIAL PAIN: ICD-10-CM

## 2018-04-16 DIAGNOSIS — R94.39 ABNORMAL CARDIOVASCULAR STRESS TEST: Primary | ICD-10-CM

## 2018-04-16 DIAGNOSIS — R06.02 SHORTNESS OF BREATH: ICD-10-CM

## 2018-04-16 DIAGNOSIS — I10 BENIGN ESSENTIAL HYPERTENSION: ICD-10-CM

## 2018-04-16 LAB
ANION GAP SERPL CALCULATED.3IONS-SCNC: 13.1 MMOL/L
APTT PPP: 25.3 SECONDS (ref 22.7–35.4)
BASOPHILS # BLD AUTO: 0.03 10*3/MM3 (ref 0–0.2)
BASOPHILS NFR BLD AUTO: 0.6 % (ref 0–1.5)
BH CV STRESS BP STAGE 1: NORMAL
BH CV STRESS BP STAGE 2: NORMAL
BH CV STRESS DURATION MIN STAGE 1: 3
BH CV STRESS DURATION MIN STAGE 2: 3
BH CV STRESS DURATION MIN STAGE 3: 0
BH CV STRESS DURATION SEC STAGE 1: 0
BH CV STRESS DURATION SEC STAGE 2: 0
BH CV STRESS DURATION SEC STAGE 3: 54
BH CV STRESS GRADE STAGE 1: 10
BH CV STRESS GRADE STAGE 2: 12
BH CV STRESS GRADE STAGE 3: 14
BH CV STRESS HR STAGE 1: 100
BH CV STRESS HR STAGE 2: 119
BH CV STRESS HR STAGE 3: 124
BH CV STRESS METS STAGE 1: 4.6
BH CV STRESS METS STAGE 2: 7.1
BH CV STRESS METS STAGE 3: 7.7
BH CV STRESS PROTOCOL 1: NORMAL
BH CV STRESS RECOVERY BP: NORMAL MMHG
BH CV STRESS RECOVERY HR: 81 BPM
BH CV STRESS SPEED STAGE 1: 1.7
BH CV STRESS SPEED STAGE 2: 2.5
BH CV STRESS SPEED STAGE 3: 3.4
BH CV STRESS STAGE 1: 1
BH CV STRESS STAGE 2: 2
BH CV STRESS STAGE 3: 3
BUN BLD-MCNC: 16 MG/DL (ref 8–23)
BUN/CREAT SERPL: 23.9 (ref 7–25)
CALCIUM SPEC-SCNC: 10.1 MG/DL (ref 8.6–10.5)
CHLORIDE SERPL-SCNC: 99 MMOL/L (ref 98–107)
CO2 SERPL-SCNC: 28.9 MMOL/L (ref 22–29)
CREAT BLD-MCNC: 0.67 MG/DL (ref 0.57–1)
DEPRECATED RDW RBC AUTO: 46.9 FL (ref 37–54)
EOSINOPHIL # BLD AUTO: 0.17 10*3/MM3 (ref 0–0.7)
EOSINOPHIL NFR BLD AUTO: 3.1 % (ref 0.3–6.2)
ERYTHROCYTE [DISTWIDTH] IN BLOOD BY AUTOMATED COUNT: 13.7 % (ref 11.7–13)
GFR SERPL CREATININE-BSD FRML MDRD: 88 ML/MIN/1.73
GLUCOSE BLD-MCNC: 105 MG/DL (ref 65–99)
HCT VFR BLD AUTO: 44.7 % (ref 35.6–45.5)
HGB BLD-MCNC: 13.9 G/DL (ref 11.9–15.5)
IMM GRANULOCYTES # BLD: 0 10*3/MM3 (ref 0–0.03)
IMM GRANULOCYTES NFR BLD: 0 % (ref 0–0.5)
INR PPP: 0.96 (ref 0.9–1.1)
LYMPHOCYTES # BLD AUTO: 0.91 10*3/MM3 (ref 0.9–4.8)
LYMPHOCYTES NFR BLD AUTO: 16.8 % (ref 19.6–45.3)
MAXIMAL PREDICTED HEART RATE: 153 BPM
MCH RBC QN AUTO: 29.2 PG (ref 26.9–32)
MCHC RBC AUTO-ENTMCNC: 31.1 G/DL (ref 32.4–36.3)
MCV RBC AUTO: 93.9 FL (ref 80.5–98.2)
MONOCYTES # BLD AUTO: 0.47 10*3/MM3 (ref 0.2–1.2)
MONOCYTES NFR BLD AUTO: 8.7 % (ref 5–12)
NEUTROPHILS # BLD AUTO: 3.84 10*3/MM3 (ref 1.9–8.1)
NEUTROPHILS NFR BLD AUTO: 70.8 % (ref 42.7–76)
PERCENT MAX PREDICTED HR: 81.05 %
PLATELET # BLD AUTO: 165 10*3/MM3 (ref 140–500)
PMV BLD AUTO: 11.8 FL (ref 6–12)
POTASSIUM BLD-SCNC: 4.5 MMOL/L (ref 3.5–5.2)
PROTHROMBIN TIME: 12.6 SECONDS (ref 11.7–14.2)
RBC # BLD AUTO: 4.76 10*6/MM3 (ref 3.9–5.2)
SODIUM BLD-SCNC: 141 MMOL/L (ref 136–145)
STRESS BASELINE BP: NORMAL MMHG
STRESS BASELINE HR: 74 BPM
STRESS PERCENT HR: 95 %
STRESS POST ESTIMATED WORKLOAD: 7.8 METS
STRESS POST EXERCISE DUR MIN: 6 MIN
STRESS POST EXERCISE DUR SEC: 54 SEC
STRESS POST PEAK BP: NORMAL MMHG
STRESS POST PEAK HR: 124 BPM
STRESS TARGET HR: 130 BPM
WBC NRBC COR # BLD: 5.42 10*3/MM3 (ref 4.5–10.7)

## 2018-04-16 PROCEDURE — 85610 PROTHROMBIN TIME: CPT | Performed by: INTERNAL MEDICINE

## 2018-04-16 PROCEDURE — 80048 BASIC METABOLIC PNL TOTAL CA: CPT | Performed by: INTERNAL MEDICINE

## 2018-04-16 PROCEDURE — 93016 CV STRESS TEST SUPVJ ONLY: CPT | Performed by: INTERNAL MEDICINE

## 2018-04-16 PROCEDURE — 85025 COMPLETE CBC W/AUTO DIFF WBC: CPT | Performed by: INTERNAL MEDICINE

## 2018-04-16 PROCEDURE — 93017 CV STRESS TEST TRACING ONLY: CPT

## 2018-04-16 PROCEDURE — 85730 THROMBOPLASTIN TIME PARTIAL: CPT | Performed by: INTERNAL MEDICINE

## 2018-04-16 PROCEDURE — 93018 CV STRESS TEST I&R ONLY: CPT | Performed by: INTERNAL MEDICINE

## 2018-04-16 PROCEDURE — 36415 COLL VENOUS BLD VENIPUNCTURE: CPT | Performed by: INTERNAL MEDICINE

## 2018-04-16 PROCEDURE — 99213 OFFICE O/P EST LOW 20 MIN: CPT | Performed by: INTERNAL MEDICINE

## 2018-04-16 RX ORDER — BISOPROLOL FUMARATE AND HYDROCHLOROTHIAZIDE 5; 6.25 MG/1; MG/1
1 TABLET ORAL DAILY
Qty: 30 TABLET | Refills: 6 | Status: SHIPPED | OUTPATIENT
Start: 2018-04-16 | End: 2018-05-14 | Stop reason: ALTCHOICE

## 2018-04-16 NOTE — PROGRESS NOTES
Subjective:       Kristi Ibrahim is a 67 y.o. female who here for follow up    CC  Post stress test, positive,   HPI  67-year-old white female was evaluated approximately one month ago for which patient underwent a stress test recently which was positive does complains of tightness and pressure sensations in the chest mild to moderate in intensity     Problem List Items Addressed This Visit        Cardiovascular and Mediastinum    Abnormal cardiovascular stress test - Primary    Relevant Orders    Case Request Cath Lab: Left Heart Cath (Completed)    CBC & Differential (Completed)    Basic Metabolic Panel (Completed)    Protime-INR (Completed)    aPTT (Completed)    CBC Auto Differential (Completed)    Benign essential hypertension    Relevant Orders    CBC & Differential (Completed)    Basic Metabolic Panel (Completed)    Protime-INR (Completed)    aPTT (Completed)    CBC Auto Differential (Completed)    Presence of stent in coronary artery    Relevant Orders    CBC & Differential (Completed)    Basic Metabolic Panel (Completed)    Protime-INR (Completed)    aPTT (Completed)    CBC Auto Differential (Completed)       Nervous and Auditory    Chest pain    Relevant Orders    Protime-INR (Completed)    aPTT (Completed)      Other Visit Diagnoses     Shortness of breath         Relevant Orders    aPTT (Completed)        .    The following portions of the patient's history were reviewed and updated as appropriate: allergies, current medications, past family history, past medical history, past social history, past surgical history and problem list.    Past Medical History:   Diagnosis Date   • Coronary artery disease    • Hyperlipidemia    • Mitral valve insufficiency    • Tricuspid regurgitation     reports that she has never smoked. She has never used smokeless tobacco. She reports that she does not drink alcohol or use drugs.  Family History   Problem Relation Age of Onset   • Heart disease Mother    • Stroke Mother   "  • Heart disease Father    • Hypertension Father    • Heart disease Brother    • Hypertension Brother        Review of Systems  Constitutional: No wt loss, fever, fatigue  Gastrointestinal: No nausea, abdominal pain  Behavioral/Psych: No insomnia or anxiety   Cardiovascular Chest pain  Objective:       Physical Exam           Physical Exam  BP 93/62 (BP Location: Right arm)   Pulse 75   Ht 161.3 cm (63.5\")   Wt 74.8 kg (165 lb)   SpO2 96%   BMI 28.77 kg/m²     General appearance: NAD, conversant   Eyes: anicteric sclerae, moist conjunctivae; no lid-lag; PERRLA   HENT: Atraumatic; oropharynx clear with moist mucous membranes and no mucosal ulcerations;  normal hard and soft palate   Neck: Trachea midline; FROM, supple, no thyromegaly or lymphadenopathy   Lungs: CTA, with normal respiratory effort and no intercostal retractions   CV: S1-S2 regular, no murmurs, no rub, no gallop   Abdomen: Soft, non-tender; no masses or HSM   Extremities: No peripheral edema or extremity lymphadenopathy  Skin: Normal temperature, turgor and texture; no rash, ulcers or subcutaneous nodules   Psych: Appropriate affect, alert and oriented to person, place and time           Cardiographics  @Procedures    Echocardiogram:        Current Outpatient Prescriptions:   •  aspirin 81 MG tablet, Take  by mouth daily., Disp: , Rfl:   •  bisoprolol-hydrochlorothiazide (ZIAC) 5-6.25 MG per tablet, Take 1 tablet by mouth Daily., Disp: 30 tablet, Rfl: 6  •  Calcium Carb-Cholecalciferol 600-200 MG-UNIT tablet, Take  by mouth., Disp: , Rfl:   •  meclizine (ANTIVERT) 25 MG tablet, Take 25 mg by mouth daily., Disp: , Rfl:   •  nitroglycerin (NITROSTAT) 0.4 MG SL tablet, Place 1 tablet under the tongue Every 5 (Five) Minutes As Needed for Chest Pain., Disp: 25 tablet, Rfl: 5  •  Omega 3 1000 MG capsule, Take  by mouth daily., Disp: , Rfl:   •  vitamin E 400 UNIT capsule, Take  by mouth., Disp: , Rfl:   •  zolpidem CR (AMBIEN CR) 12.5 MG CR tablet, " Take 12.5 mg by mouth daily., Disp: , Rfl:    Assessment:        Patient Active Problem List   Diagnosis   • Abnormal cardiovascular stress test   • Benign essential hypertension   • Chest pain   • Coronary arteriosclerosis in native artery   • Presence of stent in coronary artery     IMPRESSION OF HEART CATHETERIZATION:    1. Normal left main coronary artery.  2. Normal left anterior descending artery and its branches with proximal   stent widely patent  3. Normal left circumflex artery and its branches.  4. Normal right coronary artery.  5. Normal left ventricular systolic function.  LVEDP 10 mmHg.  Ejection   fraction 60 %.          Plan:            ICD-10-CM ICD-9-CM   1. Abnormal cardiovascular stress test R94.39 794.39   2. Benign essential hypertension I10 401.1   3. Presence of stent in coronary artery Z95.5 V45.82     1. Abnormal cardiovascular stress test    - Case Request Cath Lab: Left Heart Cath  - CBC & Differential  - Basic Metabolic Panel  - Protime-INR  - aPTT  - CBC Auto Differential    2. Benign essential hypertension  Blood pressure under control  - CBC & Differential  - Basic Metabolic Panel  - Protime-INR  - aPTT  - CBC Auto Differential    3. Presence of stent in coronary artery  Will be reevaluated by heart catheter  - CBC & Differential  - Basic Metabolic Panel  - Protime-INR  - aPTT  - CBC Auto Differential    4. Precordial pain   May BE angina  - Protime-INR  - aPTT    5. Shortness of breath   Multifactorial  - aPTT    Needs cath    Procedure, risks and options of cardiac cath explained to pt INCLUDING BUT NOT LIMITED TO MI, STROKE, DEATH, INFECTION HAEMORRHAGE, . Pt understands well and agrees with no further questions.  COUNSELING:    Kristi Reed was given to patient for the following topics: diagnostic results, risk factor reductions, impressions, risks and benefits of treatment options and importance of treatment compliance .       SMOKING COUNSELING:    Counseling given:  Not Answered      EMR Dragon/Transcription disclaimer:   Much of this encounter note is an electronic transcription/translation of spoken language to printed text. The electronic translation of spoken language may permit erroneous, or at times, nonsensical words or phrases to be inadvertently transcribed; Although I have reviewed the note for such errors, some may still exist.

## 2018-04-20 NOTE — PERIOPERATIVE NURSING NOTE
CALLED AND SPOKE WITH PT CONFIRMING ARRIVAL TIME. TOLD MAY HAVE LIGHT BREAKFAST IF DONE SO PRIOR TO 7AM AND MAY TAKE AMMEDS.  TO ACCOMPANY PT TO HOSPITAL ON MONDAY

## 2018-04-23 ENCOUNTER — HOSPITAL ENCOUNTER (OUTPATIENT)
Facility: HOSPITAL | Age: 68
Setting detail: HOSPITAL OUTPATIENT SURGERY
Discharge: HOME | End: 2018-04-23
Attending: INTERNAL MEDICINE | Admitting: INTERNAL MEDICINE

## 2018-04-23 VITALS
WEIGHT: 167 LBS | SYSTOLIC BLOOD PRESSURE: 118 MMHG | HEIGHT: 63 IN | BODY MASS INDEX: 29.59 KG/M2 | HEART RATE: 81 BPM | TEMPERATURE: 98.4 F | OXYGEN SATURATION: 99 % | RESPIRATION RATE: 16 BRPM | DIASTOLIC BLOOD PRESSURE: 76 MMHG

## 2018-04-23 DIAGNOSIS — R94.39 ABNORMAL CARDIOVASCULAR STRESS TEST: ICD-10-CM

## 2018-04-23 PROCEDURE — 0 IOPAMIDOL PER 1 ML: Performed by: INTERNAL MEDICINE

## 2018-04-23 PROCEDURE — 25010000002 METHYLPREDNISOLONE PER 125 MG: Performed by: INTERNAL MEDICINE

## 2018-04-23 PROCEDURE — 99152 MOD SED SAME PHYS/QHP 5/>YRS: CPT | Performed by: INTERNAL MEDICINE

## 2018-04-23 PROCEDURE — 25010000002 HEPARIN (PORCINE) PER 1000 UNITS: Performed by: INTERNAL MEDICINE

## 2018-04-23 PROCEDURE — 25010000002 FENTANYL CITRATE (PF) 100 MCG/2ML SOLUTION: Performed by: INTERNAL MEDICINE

## 2018-04-23 PROCEDURE — C1894 INTRO/SHEATH, NON-LASER: HCPCS | Performed by: INTERNAL MEDICINE

## 2018-04-23 PROCEDURE — 93458 L HRT ARTERY/VENTRICLE ANGIO: CPT | Performed by: INTERNAL MEDICINE

## 2018-04-23 PROCEDURE — 25010000002 DIPHENHYDRAMINE PER 50 MG: Performed by: INTERNAL MEDICINE

## 2018-04-23 PROCEDURE — C1769 GUIDE WIRE: HCPCS | Performed by: INTERNAL MEDICINE

## 2018-04-23 PROCEDURE — 25010000002 MIDAZOLAM PER 1 MG: Performed by: INTERNAL MEDICINE

## 2018-04-23 RX ORDER — LIDOCAINE HYDROCHLORIDE 10 MG/ML
0.1 INJECTION, SOLUTION EPIDURAL; INFILTRATION; INTRACAUDAL; PERINEURAL ONCE AS NEEDED
Status: DISCONTINUED | OUTPATIENT
Start: 2018-04-23 | End: 2018-04-23 | Stop reason: HOSPADM

## 2018-04-23 RX ORDER — METHYLPREDNISOLONE SODIUM SUCCINATE 125 MG/2ML
125 INJECTION, POWDER, LYOPHILIZED, FOR SOLUTION INTRAMUSCULAR; INTRAVENOUS ONCE
Status: COMPLETED | OUTPATIENT
Start: 2018-04-23 | End: 2018-04-23

## 2018-04-23 RX ORDER — MIDAZOLAM HYDROCHLORIDE 1 MG/ML
INJECTION INTRAMUSCULAR; INTRAVENOUS AS NEEDED
Status: DISCONTINUED | OUTPATIENT
Start: 2018-04-23 | End: 2018-04-23 | Stop reason: HOSPADM

## 2018-04-23 RX ORDER — DIMENHYDRINATE 50 MG/1
50 TABLET ORAL NIGHTLY PRN
COMMUNITY

## 2018-04-23 RX ORDER — LIDOCAINE HYDROCHLORIDE 20 MG/ML
INJECTION, SOLUTION INFILTRATION; PERINEURAL AS NEEDED
Status: DISCONTINUED | OUTPATIENT
Start: 2018-04-23 | End: 2018-04-23 | Stop reason: HOSPADM

## 2018-04-23 RX ORDER — SODIUM CHLORIDE 9 MG/ML
75 INJECTION, SOLUTION INTRAVENOUS CONTINUOUS
Status: DISCONTINUED | OUTPATIENT
Start: 2018-04-23 | End: 2018-04-23 | Stop reason: HOSPADM

## 2018-04-23 RX ORDER — DIPHENHYDRAMINE HYDROCHLORIDE 50 MG/ML
25 INJECTION INTRAMUSCULAR; INTRAVENOUS ONCE
Status: COMPLETED | OUTPATIENT
Start: 2018-04-23 | End: 2018-04-23

## 2018-04-23 RX ORDER — CETIRIZINE HYDROCHLORIDE 10 MG/1
10 TABLET ORAL DAILY
COMMUNITY

## 2018-04-23 RX ORDER — MAGNESIUM OXIDE 400 MG/1
400 TABLET ORAL 2 TIMES DAILY
COMMUNITY

## 2018-04-23 RX ORDER — SODIUM CHLORIDE 0.9 % (FLUSH) 0.9 %
1-10 SYRINGE (ML) INJECTION AS NEEDED
Status: DISCONTINUED | OUTPATIENT
Start: 2018-04-23 | End: 2018-04-23 | Stop reason: HOSPADM

## 2018-04-23 RX ORDER — FENTANYL CITRATE 50 UG/ML
INJECTION, SOLUTION INTRAMUSCULAR; INTRAVENOUS AS NEEDED
Status: DISCONTINUED | OUTPATIENT
Start: 2018-04-23 | End: 2018-04-23 | Stop reason: HOSPADM

## 2018-04-23 RX ADMIN — METHYLPREDNISOLONE SODIUM SUCCINATE 125 MG: 125 INJECTION, POWDER, FOR SOLUTION INTRAMUSCULAR; INTRAVENOUS at 15:36

## 2018-04-23 RX ADMIN — SODIUM CHLORIDE 75 ML/HR: 9 INJECTION, SOLUTION INTRAVENOUS at 13:36

## 2018-04-23 RX ADMIN — DIPHENHYDRAMINE HYDROCHLORIDE 25 MG: 50 INJECTION INTRAMUSCULAR; INTRAVENOUS at 15:06

## 2018-04-23 NOTE — DISCHARGE INSTRUCTIONS
Moderate Conscious Sedation, Adult, Care After  Refer to this sheet in the next few weeks. These instructions provide you with information on caring for yourself after your procedure. Your health care provider may also give you more specific instructions. Your treatment has been planned according to current medical practices, but problems sometimes occur. Call your health care provider if you have any problems or questions after your procedure.  WHAT TO EXPECT AFTER THE PROCEDURE   After your procedure:  · You may feel sleepy, clumsy, and have poor balance for several hours.  · Vomiting may occur if you eat too soon after the procedure.  HOME CARE INSTRUCTIONS  · Do not participate in any activities where you could become injured for at least 24 hours. Do not:    Drive.    Swim.    Ride a bicycle.    Operate heavy machinery.    Cook.    Use power tools.    Climb ladders.    Work from a high place.  · Do not make important decisions or sign legal documents until you are improved.  · If you vomit, drink water, juice, or soup when you can drink without vomiting. Make sure you have little or no nausea before eating solid foods.  · Only take over-the-counter or prescription medicines for pain, discomfort, or fever as directed by your health care provider.  · Make sure you and your family fully understand everything about the medicines given to you, including what side effects may occur.  · You should not drink alcohol, take sleeping pills, or take medicines that cause drowsiness for at least 24 hours.  · If you smoke, do not smoke without supervision.  · If you are feeling better, you may resume normal activities 24 hours after you were sedated.  · Keep all appointments with your health care provider.  · You should have a responsible adult stay with you for the first 24 hours post procedure.  SEEK MEDICAL CARE IF:  · Your skin is pale or bluish in color.  · You continue to feel nauseous or vomit.  · Your pain is getting  worse and is not helped by medicine.  · You have bleeding or swelling.  · You are still sleepy or feeling clumsy after 24 hours.  SEEK IMMEDIATE MEDICAL CARE IF:  · You develop a rash.  · You have difficulty breathing.  · You develop any type of allergic problem.  · You have a fever.  MAKE SURE YOU:  · Understand these instructions.  · Will watch your condition.  · Will get help right away if you are not doing well or get worse.     This information is not intended to replace advice given to you by your health care provider. Make sure you discuss any questions you have with your health care provider.     Document Released: 10/08/2014 Document Revised: 01/08/2016 Document Reviewed: 10/08/2014  [employer] Interactive Patient Education ©2016 [employer].    [facility]  [address] [place] [address]    Coronary Angiogram (Radial/Ulnar Approach) After Care    Refer to this sheet in the next few weeks. These instructions provide you with information on caring for yourself after your procedure. Your caregiver may also give you more specific instructions. Your treatment has been planned according to current medical practices, but problems sometimes occur. Call your caregiver if you have any problems or questions after your procedure.    Home Care Instructions:  · You may shower the day after the procedure. Remove the bandage (dressing) and gently wash the site with plain soap and water. Gently pat the site dry. You may apply a band aid daily for 2 days if desired.    · Do not apply powder or lotion to the site.  · Do not submerge the affected site in water for 3 to 5 days or until the site is completely healed.   · Do not lift, push or pull anything over 10 pounds for 2 days after your procedure.  · Inspect the site at least twice daily. You may notice some bruising at the site and it may be tender for 1 to 2 weeks.     · Increase your fluid intake for the next 2 days.    · Keep arm elevated for 24  hours. For the remainder of the day, keep your arm in “Pledge of Allegiance” position when up and about.     · You may drive 24 hours after the procedure unless otherwise instructed by your caregiver.  · Do not operate machinery or power tools for 24 hours.  · A responsible adult should be with you for the first 24 hours after you arrive home. Do not make any important legal decisions or sign legal papers for 24 hours.  Do not drink alcohol for 24 hours.    · Metformin or any medications containing Metformin should not be taken for 48 hours after your procedure.      Call Your Doctor if:   · You have unusual pain at the radial/ulnar (wrist) site.  · You have redness, warmth, swelling, or pain at the radial/ulnar (wrist) site.  · You have drainage (other than a small amount of blood on the dressing).  · You have chills or a fever > 101.  · Your arm becomes pale or dark, cool, tingly, or numb.  · You have heavy bleeding from the site, hold pressure on the site for 20 minutes.  If the bleeding stops, apply a fresh bandage and call your cardiologist.  However, if you continue to have bleeding, call 911.

## 2018-04-26 ENCOUNTER — TELEPHONE (OUTPATIENT)
Dept: CARDIOLOGY | Facility: CLINIC | Age: 68
End: 2018-04-26

## 2018-04-30 ENCOUNTER — TELEPHONE (OUTPATIENT)
Dept: CARDIOLOGY | Facility: CLINIC | Age: 68
End: 2018-04-30

## 2018-04-30 NOTE — TELEPHONE ENCOUNTER
[facility] STATES SHE IS HAVING PROBLEMS W ZIAC 5-6.25    CAUSING HER EXTREME FATIGUE     [phone]

## 2018-05-02 RX ORDER — NEBIVOLOL 5 MG/1
5 TABLET ORAL DAILY
Qty: 30 TABLET | Refills: 2 | Status: SHIPPED | OUTPATIENT
Start: 2018-05-02 | End: 2018-06-05 | Stop reason: DRUGHIGH

## 2018-05-14 ENCOUNTER — OFFICE VISIT (OUTPATIENT)
Dept: CARDIOLOGY | Facility: CLINIC | Age: 68
End: 2018-05-14

## 2018-05-14 VITALS
SYSTOLIC BLOOD PRESSURE: 102 MMHG | WEIGHT: 167 LBS | BODY MASS INDEX: 28.51 KG/M2 | HEART RATE: 66 BPM | HEIGHT: 64 IN | DIASTOLIC BLOOD PRESSURE: 63 MMHG

## 2018-05-14 DIAGNOSIS — I10 BENIGN ESSENTIAL HYPERTENSION: Primary | ICD-10-CM

## 2018-05-14 DIAGNOSIS — I25.10 CORONARY ARTERIOSCLEROSIS IN NATIVE ARTERY: ICD-10-CM

## 2018-05-14 PROCEDURE — 99213 OFFICE O/P EST LOW 20 MIN: CPT | Performed by: INTERNAL MEDICINE

## 2018-05-14 RX ORDER — NEBIVOLOL 5 MG/1
5 TABLET ORAL
COMMUNITY
End: 2018-05-14

## 2018-05-14 NOTE — PROGRESS NOTES
" Subjective:       Kristi Ibrahim is a 67 y.o. female who here for follow up    CC  Post cath    Still wk and tired  HPI  67-year-old white female with a known history of the coronary artery disease as well as hypertension recently had a heart catheter done without pain function complications    Patient is feeling weak as well as tired     Problem List Items Addressed This Visit        Cardiovascular and Mediastinum    Benign essential hypertension - Primary    Coronary arteriosclerosis in native artery      Other Visit Diagnoses    None.       1. .Normal left main  2. Normal LAD including the diagonal and  branches with the proximal LAD stent widely patent  3. Circumflex artery nondominant and normal  4. RCA dominant with no significant atherosclerosis  5. Normal LV gram     Recommendations:      1. With the LAD stent widely patent no significant stenosis patient be treated medically      The following portions of the patient's history were reviewed and updated as appropriate: allergies, current medications, past family history, past medical history, past social history, past surgical history and problem list.    Past Medical History:   Diagnosis Date   • Arthritis    • Asthma     \"ALLERGIC\"   • Coronary artery disease    • Fibromyalgia    • Hyperlipidemia    • Hypertension    • Migraines    • Mitral valve insufficiency    • Tricuspid regurgitation     reports that she has never smoked. She has never used smokeless tobacco. She reports that she does not drink alcohol or use drugs.  Family History   Problem Relation Age of Onset   • Heart disease Mother    • Stroke Mother    • Heart disease Father    • Hypertension Father    • Heart disease Brother    • Hypertension Brother        Review of Systems  Constitutional: No wt loss, fever, fatigue  Gastrointestinal: No nausea, abdominal pain  Behavioral/Psych: No insomnia or anxiety   Cardiovascular Feeling weak and tired  Objective:       Physical Exam           " "Physical Exam  /63 (BP Location: Left arm, Patient Position: Sitting)   Pulse 66   Ht 161.3 cm (63.5\")   Wt 75.8 kg (167 lb)   BMI 29.12 kg/m²     General appearance: NAD, conversant   Eyes: anicteric sclerae, moist conjunctivae; no lid-lag; PERRLA   HENT: Atraumatic; oropharynx clear with moist mucous membranes and no mucosal ulcerations;  normal hard and soft palate   Neck: Trachea midline; FROM, supple, no thyromegaly or lymphadenopathy   Lungs: CTA, with normal respiratory effort and no intercostal retractions   CV: S1-S2 regular, no murmurs, no rub, no gallop   Abdomen: Soft, non-tender; no masses or HSM   Extremities: No peripheral edema or extremity lymphadenopathy  Skin: Normal temperature, turgor and texture; no rash, ulcers or subcutaneous nodules   Psych: Appropriate affect, alert and oriented to person, place and time           Cardiographics  @Procedures    Echocardiogram:        Current Outpatient Prescriptions:   •  ALBUTEROL IN, Inhale 1 inhaler As Needed., Disp: , Rfl:   •  aspirin 81 MG tablet, Take 81 mg by mouth Daily., Disp: , Rfl:   •  Calcium Carb-Cholecalciferol 600-200 MG-UNIT tablet, Take  by mouth., Disp: , Rfl:   •  cetirizine (zyrTEC) 10 MG tablet, Take 10 mg by mouth Daily., Disp: , Rfl:   •  Cholecalciferol (VITAMIN D) 400 UNIT/ML liquid, Take 5 drops by mouth Daily., Disp: , Rfl:   •  Coenzyme Q10-Fish Oil-Vit E (CO-Q 10 OMEGA-3 FISH OIL PO), Take 1 tablet by mouth Daily., Disp: , Rfl:   •  dimenhyDRINATE (DRAMAMINE) 50 MG tablet, Take 50 mg by mouth At Night As Needed for movement disorders., Disp: , Rfl:   •  magnesium oxide (MAG-OX) 400 MG tablet, Take 400 mg by mouth 2 (Two) Times a Day., Disp: , Rfl:   •  meclizine (ANTIVERT) 25 MG tablet, Take 25 mg by mouth daily., Disp: , Rfl:   •  Multiple Vitamins-Minerals (HAIR SKIN AND NAILS FORMULA PO), Take 1 tablet by mouth Daily., Disp: , Rfl:   •  nebivolol (BYSTOLIC) 5 MG tablet, Take 1 tablet by mouth Daily., Disp: 30 " tablet, Rfl: 2  •  nitroglycerin (NITROSTAT) 0.4 MG SL tablet, Place 1 tablet under the tongue Every 5 (Five) Minutes As Needed for Chest Pain., Disp: 25 tablet, Rfl: 5  •  Probiotic Product (SOLUBLE FIBER/PROBIOTICS PO), Take 1 tablet by mouth Daily., Disp: , Rfl:   •  Unable to find, Take 1 each by mouth 1 (One) Time. FOOD ENZYMES, Disp: , Rfl:   •  Unable to find, Take 1 each by mouth 1 (One) Time. GLUTEN DIGEST, Disp: , Rfl:   •  Unable to find, Take 1 each by mouth 3 (Three) Times a Day. HEMOVITE, Disp: , Rfl:   •  Unable to find, Take 1 each by mouth 2 (Two) Times a Day. INSTAFLEX, Disp: , Rfl:   •  Unable to find, Take 100 each by mouth 1 (One) Time. 5HTP, Disp: , Rfl:   •  Unable to find, Take 1 each by mouth 2 (Two) Times a Day Before Meals. LECTIN DEFENSE, Disp: , Rfl:   •  vitamin E 400 UNIT capsule, Take 400 Units by mouth Daily., Disp: , Rfl:   •  zolpidem CR (AMBIEN CR) 12.5 MG CR tablet, Take 12.5 mg by mouth daily., Disp: , Rfl:    Assessment:        Patient Active Problem List   Diagnosis   • Abnormal cardiovascular stress test   • Benign essential hypertension   • Chest pain   • Coronary arteriosclerosis in native artery   • Presence of stent in coronary artery               Plan:            ICD-10-CM ICD-9-CM   1. Benign essential hypertension I10 401.1   2. Coronary arteriosclerosis in native artery I25.10 414.01     1. Benign essential hypertension  Blood pressures are running on the low side we will reduce the Bystolic to 2.5 mg one once a day    2. Coronary arteriosclerosis in native artery  Kristi Ibrahim here for the follow-up post heart catheter, being treated medically.Kristi Ibrahim has no complications.  Access site has been examined shows no complications.  Patient has been advised to contact us with any further issues and questions related to the heart catheter      Reduce bystolic to 2.5 mg po daily    See us 6 month  COUNSELING:    Kristi Reed was given to patient for the  following topics: diagnostic results, risk factor reductions, impressions, risks and benefits of treatment options and importance of treatment compliance .       SMOKING COUNSELING:    Counseling given: Not Answered      EMR Dragon/Transcription disclaimer:   Much of this encounter note is an electronic transcription/translation of spoken language to printed text. The electronic translation of spoken language may permit erroneous, or at times, nonsensical words or phrases to be inadvertently transcribed; Although I have reviewed the note for such errors, some may still exist.

## 2018-06-05 ENCOUNTER — TELEPHONE (OUTPATIENT)
Dept: CARDIOLOGY | Facility: CLINIC | Age: 68
End: 2018-06-05

## 2018-06-05 RX ORDER — NEBIVOLOL 2.5 MG/1
2.5 TABLET ORAL DAILY
Qty: 90 TABLET | Refills: 1 | Status: SHIPPED | OUTPATIENT
Start: 2018-06-05 | End: 2019-01-28 | Stop reason: SDUPTHER

## 2018-06-05 NOTE — TELEPHONE ENCOUNTER
LENARD CALLED -SAID EVEN THOUGH SHE HAD HEART CATH AND ALL WAS GOOD SHE IS STILL BECOMING SOB  WITH  JUST  SLIGHT EXERTION - TIGHTNESS IN CHEST AND LEGS GET REALLY WEAK. THINKS ITS THE ELECTICAL PART OF THE  HEART - WHAT SHOULD SHE DO? PLEASE ADVISE     [phone]

## 2018-06-08 DIAGNOSIS — R00.2 PALPITATION: Primary | ICD-10-CM

## 2018-06-08 NOTE — TELEPHONE ENCOUNTER
Per Dr Breen pt to wear 48 hr holter.     Brenda ARDON s/w pt gave instructions.   Pt states she with be out of town until 6/14 and will wear monitor when returns

## 2018-07-05 ENCOUNTER — TELEPHONE (OUTPATIENT)
Dept: CARDIOLOGY | Facility: CLINIC | Age: 68
End: 2018-07-05

## 2018-10-10 ENCOUNTER — CONVERSION ENCOUNTER (OUTPATIENT)
Dept: ORTHOPEDIC SURGERY | Facility: CLINIC | Age: 68
End: 2018-10-10

## 2018-10-10 ENCOUNTER — OFFICE VISIT CONVERTED (OUTPATIENT)
Dept: ORTHOPEDIC SURGERY | Facility: CLINIC | Age: 68
End: 2018-10-10
Attending: ORTHOPAEDIC SURGERY

## 2018-10-22 ENCOUNTER — OFFICE VISIT CONVERTED (OUTPATIENT)
Dept: ORTHOPEDIC SURGERY | Facility: CLINIC | Age: 68
End: 2018-10-22
Attending: ORTHOPAEDIC SURGERY

## 2018-11-08 ENCOUNTER — OFFICE VISIT (OUTPATIENT)
Dept: CARDIOLOGY | Facility: CLINIC | Age: 68
End: 2018-11-08

## 2018-11-08 VITALS
DIASTOLIC BLOOD PRESSURE: 84 MMHG | WEIGHT: 168 LBS | HEART RATE: 69 BPM | SYSTOLIC BLOOD PRESSURE: 139 MMHG | HEIGHT: 64 IN | BODY MASS INDEX: 28.68 KG/M2

## 2018-11-08 DIAGNOSIS — Z95.5 PRESENCE OF STENT IN CORONARY ARTERY: Primary | ICD-10-CM

## 2018-11-08 DIAGNOSIS — I10 BENIGN ESSENTIAL HYPERTENSION: ICD-10-CM

## 2018-11-08 PROCEDURE — 99213 OFFICE O/P EST LOW 20 MIN: CPT | Performed by: INTERNAL MEDICINE

## 2018-11-08 NOTE — PROGRESS NOTES
" Subjective:        Kristi Ibrahim is a 68 y.o. female who here for follow up    CC  The follow-up for the coronary artery disease and hypertension  HPI  68-year-old female with a known history of the benign essential arterial hypertension coronary artery disease with a previous stent here for the follow-up    Kristi Ibrahim  here for follow up with no complaints of chest pain, sob, palpitation, syncope, near syncope  No side effects with current meds  No pnd, orthopnea       Problem List Items Addressed This Visit        Cardiovascular and Mediastinum    Benign essential hypertension    Presence of stent in coronary artery - Primary        .    The following portions of the patient's history were reviewed and updated as appropriate: allergies, current medications, past family history, past medical history, past social history, past surgical history and problem list.    Past Medical History:   Diagnosis Date   • Arthritis    • Asthma     \"ALLERGIC\"   • Coronary artery disease    • Fibromyalgia    • Hyperlipidemia    • Hypertension    • Migraines    • Mitral valve insufficiency    • Tricuspid regurgitation      reports that she has never smoked. She has never used smokeless tobacco. She reports that she does not drink alcohol or use drugs.   Family History   Problem Relation Age of Onset   • Heart disease Mother    • Stroke Mother    • Heart disease Father    • Hypertension Father    • Heart disease Brother    • Hypertension Brother        Review of Systems  Constitutional: No wt loss, fever, fatigue  Gastrointestinal: No nausea, abdominal pain  Behavioral/Psych: No insomnia or anxiety   Cardiovascular no chest pains or tightness in chest  Objective:                 Physical Exam  /84 (BP Location: Left arm, Patient Position: Sitting)   Pulse 69   Ht 161.3 cm (63.5\")   Wt 76.2 kg (168 lb)   BMI 29.29 kg/m²     General appearance: NAD, conversant   Eyes: anicteric sclerae, moist conjunctivae; no lid-lag; PERRLA "   HENT: Atraumatic; oropharynx clear with moist mucous membranes and no mucosal ulcerations;  normal hard and soft palate   Neck: Trachea midline; FROM, supple, no thyromegaly or lymphadenopathy   Lungs: CTA, with normal respiratory effort and no intercostal retractions   CV: S1-S2 regular, no murmurs, no rub, no gallop   Abdomen: Soft, non-tender; no masses or HSM   Extremities: No peripheral edema or extremity lymphadenopathy  Skin: Normal temperature, turgor and texture; no rash, ulcers or subcutaneous nodules   Psych: Appropriate affect, alert and oriented to person, place and time           Cardiographics  @Procedures    Echocardiogram:        Current Outpatient Prescriptions:   •  ALBUTEROL IN, Inhale 1 inhaler As Needed., Disp: , Rfl:   •  aspirin 81 MG tablet, Take 81 mg by mouth Daily., Disp: , Rfl:   •  Calcium Carb-Cholecalciferol 600-200 MG-UNIT tablet, Take  by mouth., Disp: , Rfl:   •  cetirizine (zyrTEC) 10 MG tablet, Take 10 mg by mouth Daily., Disp: , Rfl:   •  Cholecalciferol (VITAMIN D) 400 UNIT/ML liquid, Take 5 drops by mouth Daily., Disp: , Rfl:   •  Coenzyme Q10-Fish Oil-Vit E (CO-Q 10 OMEGA-3 FISH OIL PO), Take 1 tablet by mouth Daily., Disp: , Rfl:   •  dimenhyDRINATE (DRAMAMINE) 50 MG tablet, Take 50 mg by mouth At Night As Needed for movement disorders., Disp: , Rfl:   •  magnesium oxide (MAG-OX) 400 MG tablet, Take 400 mg by mouth 2 (Two) Times a Day., Disp: , Rfl:   •  meclizine (ANTIVERT) 25 MG tablet, Take 25 mg by mouth daily., Disp: , Rfl:   •  Multiple Vitamins-Minerals (HAIR SKIN AND NAILS FORMULA PO), Take 1 tablet by mouth Daily., Disp: , Rfl:   •  nebivolol (BYSTOLIC) 2.5 MG tablet, Take 1 tablet by mouth Daily., Disp: 90 tablet, Rfl: 1  •  nitroglycerin (NITROSTAT) 0.4 MG SL tablet, Place 1 tablet under the tongue Every 5 (Five) Minutes As Needed for Chest Pain., Disp: 25 tablet, Rfl: 5  •  Probiotic Product (SOLUBLE FIBER/PROBIOTICS PO), Take 1 tablet by mouth Daily., Disp: ,  Rfl:   •  Unable to find, Take 1 each by mouth 1 (One) Time. FOOD ENZYMES, Disp: , Rfl:   •  Unable to find, Take 1 each by mouth 1 (One) Time. GLUTEN DIGEST, Disp: , Rfl:   •  Unable to find, Take 1 each by mouth 3 (Three) Times a Day. HEMOVITE, Disp: , Rfl:   •  Unable to find, Take 1 each by mouth 2 (Two) Times a Day. INSTAFLEX, Disp: , Rfl:   •  Unable to find, Take 100 each by mouth 1 (One) Time. 5HTP, Disp: , Rfl:   •  Unable to find, Take 1 each by mouth 2 (Two) Times a Day Before Meals. LECTIN DEFENSE, Disp: , Rfl:   •  vitamin E 400 UNIT capsule, Take 400 Units by mouth Daily., Disp: , Rfl:    Assessment:        Patient Active Problem List   Diagnosis   • Abnormal cardiovascular stress test   • Benign essential hypertension   • Chest pain   • Coronary arteriosclerosis in native artery   • Presence of stent in coronary artery         Summary     · An abnormal monitor study.  · NSR WITH FREQUENT PACS, PVCS AND OCCASIONAL NON SUSTAINED SVT           Plan:            ICD-10-CM ICD-9-CM   1. Presence of stent in coronary artery Z95.5 V45.82   2. Benign essential hypertension I10 401.1     1. Presence of stent in coronary artery  No chest    2. Benign essential hypertension  Malina control    3.  PACs PVCs with nonsustained SVT   Treat conservatively    SEE IN 1 YR  COUNSELING:    Kristi Reed was given to patient for the following topics: diagnostic results, risk factor reductions, impressions, risks and benefits of treatment options and importance of treatment compliance .       SMOKING COUNSELING:    Counseling given: Not Answered      EMR Dragon/Transcription disclaimer:   Much of this encounter note is an electronic transcription/translation of spoken language to printed text. The electronic translation of spoken language may permit erroneous, or at times, nonsensical words or phrases to be inadvertently transcribed; Although I have reviewed the note for such errors, some may still exist.

## 2018-11-30 ENCOUNTER — CONVERSION ENCOUNTER (OUTPATIENT)
Dept: ORTHOPEDIC SURGERY | Facility: CLINIC | Age: 68
End: 2018-11-30

## 2018-11-30 ENCOUNTER — OFFICE VISIT CONVERTED (OUTPATIENT)
Dept: ORTHOPEDIC SURGERY | Facility: CLINIC | Age: 68
End: 2018-11-30
Attending: ORTHOPAEDIC SURGERY

## 2019-01-28 RX ORDER — NEBIVOLOL HYDROCHLORIDE 2.5 MG/1
TABLET ORAL
Qty: 90 TABLET | Refills: 1 | Status: SHIPPED | OUTPATIENT
Start: 2019-01-28 | End: 2019-06-24 | Stop reason: SDUPTHER

## 2019-02-14 ENCOUNTER — HOSPITAL ENCOUNTER (OUTPATIENT)
Dept: MRI IMAGING | Facility: HOSPITAL | Age: 69
Discharge: HOME | End: 2019-02-14

## 2019-02-14 LAB
CREAT BLD-MCNC: 0.7 MG/DL (ref 0.6–1.4)
GFR SERPLBLD BASED ON 1.73 SQ M-ARVRAT: >60 ML/MIN/{1.73_M2}

## 2019-06-13 ENCOUNTER — HOSPITAL ENCOUNTER (OUTPATIENT)
Dept: MRI IMAGING | Facility: HOSPITAL | Age: 69
Discharge: HOME | End: 2019-06-13
Attending: ANESTHESIOLOGY

## 2019-06-24 RX ORDER — NEBIVOLOL HYDROCHLORIDE 2.5 MG/1
TABLET ORAL
Qty: 90 TABLET | Refills: 1 | Status: SHIPPED | OUTPATIENT
Start: 2019-06-24 | End: 2020-01-13

## 2019-07-31 ENCOUNTER — OFFICE VISIT CONVERTED (OUTPATIENT)
Dept: NEUROSURGERY | Facility: CLINIC | Age: 69
End: 2019-07-31
Attending: PHYSICIAN ASSISTANT

## 2019-08-13 ENCOUNTER — HOSPITAL ENCOUNTER (OUTPATIENT)
Dept: GENERAL RADIOLOGY | Facility: HOSPITAL | Age: 69
Discharge: HOME | End: 2019-08-13
Attending: PHYSICIAN ASSISTANT

## 2019-09-10 ENCOUNTER — CONVERSION ENCOUNTER (OUTPATIENT)
Dept: NEUROLOGY | Facility: CLINIC | Age: 69
End: 2019-09-10

## 2019-09-10 ENCOUNTER — OFFICE VISIT CONVERTED (OUTPATIENT)
Dept: NEUROSURGERY | Facility: CLINIC | Age: 69
End: 2019-09-10
Attending: NEUROLOGICAL SURGERY

## 2019-09-12 VITALS
WEIGHT: 182 LBS | DIASTOLIC BLOOD PRESSURE: 78 MMHG | HEIGHT: 63 IN | HEART RATE: 80 BPM | SYSTOLIC BLOOD PRESSURE: 142 MMHG

## 2019-09-12 PROBLEM — D12.2 BENIGN NEOPLASM OF ASCENDING COLON: Status: INACTIVE | Noted: 2017-09-11

## 2019-09-12 PROBLEM — K57.30 DVRTCLOS OF LG INT W/O PERFORATION OR ABSCESS W/O BLEEDING: Status: INACTIVE | Noted: 2017-09-11

## 2019-09-12 PROBLEM — D12.3 BENIGN NEOPLASM OF TRANSVERSE COLON: Status: INACTIVE | Noted: 2017-09-11

## 2019-09-13 ENCOUNTER — OFFICE (AMBULATORY)
Dept: URBAN - METROPOLITAN AREA CLINIC 75 | Facility: CLINIC | Age: 69
End: 2019-09-13
Payer: COMMERCIAL

## 2019-09-13 DIAGNOSIS — R14.3 FLATULENCE: ICD-10-CM

## 2019-09-13 DIAGNOSIS — A04.9 BACTERIAL INTESTINAL INFECTION, UNSPECIFIED: ICD-10-CM

## 2019-09-13 DIAGNOSIS — Z79.82 LONG TERM (CURRENT) USE OF ASPIRIN: ICD-10-CM

## 2019-09-13 DIAGNOSIS — K59.00 CONSTIPATION, UNSPECIFIED: ICD-10-CM

## 2019-09-13 DIAGNOSIS — R12 HEARTBURN: ICD-10-CM

## 2019-09-13 DIAGNOSIS — I25.10 ATHEROSCLEROTIC HEART DISEASE OF NATIVE CORONARY ARTERY WITH: ICD-10-CM

## 2019-09-13 DIAGNOSIS — Z95.5 PRESENCE OF CORONARY ANGIOPLASTY IMPLANT AND GRAFT: ICD-10-CM

## 2019-09-13 DIAGNOSIS — K30 FUNCTIONAL DYSPEPSIA: ICD-10-CM

## 2019-09-13 DIAGNOSIS — M79.7 FIBROMYALGIA: ICD-10-CM

## 2019-09-13 DIAGNOSIS — Z86.010 PERSONAL HISTORY OF COLONIC POLYPS: ICD-10-CM

## 2019-09-13 DIAGNOSIS — R13.10 DYSPHAGIA, UNSPECIFIED: ICD-10-CM

## 2019-09-13 DIAGNOSIS — K57.30 DIVERTICULOSIS OF LARGE INTESTINE WITHOUT PERFORATION OR ABS: ICD-10-CM

## 2019-09-13 PROCEDURE — 99213 OFFICE O/P EST LOW 20 MIN: CPT | Performed by: INTERNAL MEDICINE

## 2019-10-10 VITALS
HEIGHT: 63 IN | RESPIRATION RATE: 14 BRPM | DIASTOLIC BLOOD PRESSURE: 57 MMHG | HEART RATE: 69 BPM | HEART RATE: 70 BPM | OXYGEN SATURATION: 89 % | WEIGHT: 179 LBS | OXYGEN SATURATION: 94 % | SYSTOLIC BLOOD PRESSURE: 105 MMHG | TEMPERATURE: 96.8 F | SYSTOLIC BLOOD PRESSURE: 113 MMHG | RESPIRATION RATE: 18 BRPM | SYSTOLIC BLOOD PRESSURE: 114 MMHG | RESPIRATION RATE: 12 BRPM | SYSTOLIC BLOOD PRESSURE: 111 MMHG | DIASTOLIC BLOOD PRESSURE: 79 MMHG | TEMPERATURE: 97.1 F | OXYGEN SATURATION: 92 % | DIASTOLIC BLOOD PRESSURE: 68 MMHG | OXYGEN SATURATION: 91 % | RESPIRATION RATE: 20 BRPM | HEART RATE: 74 BPM | DIASTOLIC BLOOD PRESSURE: 67 MMHG | HEART RATE: 75 BPM | DIASTOLIC BLOOD PRESSURE: 78 MMHG | SYSTOLIC BLOOD PRESSURE: 129 MMHG | DIASTOLIC BLOOD PRESSURE: 64 MMHG | HEART RATE: 78 BPM | SYSTOLIC BLOOD PRESSURE: 108 MMHG | SYSTOLIC BLOOD PRESSURE: 127 MMHG | OXYGEN SATURATION: 100 % | DIASTOLIC BLOOD PRESSURE: 75 MMHG | OXYGEN SATURATION: 98 %

## 2019-10-11 ENCOUNTER — AMBULATORY SURGICAL CENTER (AMBULATORY)
Dept: URBAN - METROPOLITAN AREA SURGERY 17 | Facility: SURGERY | Age: 69
End: 2019-10-11
Payer: COMMERCIAL

## 2019-10-11 ENCOUNTER — OFFICE (AMBULATORY)
Dept: URBAN - METROPOLITAN AREA PATHOLOGY 4 | Facility: PATHOLOGY | Age: 69
End: 2019-10-11
Payer: COMMERCIAL

## 2019-10-11 DIAGNOSIS — K30 FUNCTIONAL DYSPEPSIA: ICD-10-CM

## 2019-10-11 DIAGNOSIS — K29.50 UNSPECIFIED CHRONIC GASTRITIS WITHOUT BLEEDING: ICD-10-CM

## 2019-10-11 DIAGNOSIS — R13.10 DYSPHAGIA, UNSPECIFIED: ICD-10-CM

## 2019-10-11 DIAGNOSIS — D17.5 BENIGN LIPOMATOUS NEOPLASM OF INTRA-ABDOMINAL ORGANS: ICD-10-CM

## 2019-10-11 DIAGNOSIS — K29.70 GASTRITIS, UNSPECIFIED, WITHOUT BLEEDING: ICD-10-CM

## 2019-10-11 PROCEDURE — 43239 EGD BIOPSY SINGLE/MULTIPLE: CPT | Performed by: INTERNAL MEDICINE

## 2019-10-11 PROCEDURE — 43450 DILATE ESOPHAGUS 1/MULT PASS: CPT | Performed by: INTERNAL MEDICINE

## 2019-10-11 PROCEDURE — 88305 TISSUE EXAM BY PATHOLOGIST: CPT | Performed by: INTERNAL MEDICINE

## 2019-10-11 PROCEDURE — 88342 IMHCHEM/IMCYTCHM 1ST ANTB: CPT | Performed by: INTERNAL MEDICINE

## 2019-11-06 ENCOUNTER — OFFICE VISIT CONVERTED (OUTPATIENT)
Dept: OTOLARYNGOLOGY | Facility: CLINIC | Age: 69
End: 2019-11-06
Attending: OTOLARYNGOLOGY

## 2019-11-06 ENCOUNTER — CONVERSION ENCOUNTER (OUTPATIENT)
Dept: OTOLARYNGOLOGY | Facility: CLINIC | Age: 69
End: 2019-11-06

## 2019-11-07 ENCOUNTER — OFFICE VISIT (OUTPATIENT)
Dept: CARDIOLOGY | Facility: CLINIC | Age: 69
End: 2019-11-07

## 2019-11-07 VITALS
SYSTOLIC BLOOD PRESSURE: 120 MMHG | BODY MASS INDEX: 31.41 KG/M2 | HEIGHT: 64 IN | WEIGHT: 184 LBS | HEART RATE: 71 BPM | DIASTOLIC BLOOD PRESSURE: 77 MMHG

## 2019-11-07 DIAGNOSIS — I10 BENIGN ESSENTIAL HYPERTENSION: ICD-10-CM

## 2019-11-07 DIAGNOSIS — I25.10 CORONARY ARTERIOSCLEROSIS IN NATIVE ARTERY: Primary | ICD-10-CM

## 2019-11-07 DIAGNOSIS — R07.1 CHEST PAIN ON BREATHING: ICD-10-CM

## 2019-11-07 PROCEDURE — 93000 ELECTROCARDIOGRAM COMPLETE: CPT | Performed by: INTERNAL MEDICINE

## 2019-11-07 PROCEDURE — 99213 OFFICE O/P EST LOW 20 MIN: CPT | Performed by: INTERNAL MEDICINE

## 2019-11-07 NOTE — PROGRESS NOTES
" Subjective:        Kristi Ibrahim is a 69 y.o. female who here for follow up    CC  To follow-up for the coronary artery disease hypertension  HPI  69-year-old female with known history of the coronary artery disease, benign essential arterial hypertension here for the follow-up with no complaints of chest pains or tightness no chest no heaviness of the pressure sensation     Problem List Items Addressed This Visit        Cardiovascular and Mediastinum    Benign essential hypertension    Coronary arteriosclerosis in native artery - Primary       Nervous and Auditory    Chest pain        .    The following portions of the patient's history were reviewed and updated as appropriate: allergies, current medications, past family history, past medical history, past social history, past surgical history and problem list.    Past Medical History:   Diagnosis Date   • Arthritis    • Asthma     \"ALLERGIC\"   • Coronary artery disease    • Fibromyalgia    • Hyperlipidemia    • Hypertension    • Migraines    • Mitral valve insufficiency    • Parkinson's disease (CMS/HCC)    • Tricuspid regurgitation      reports that she has never smoked. She has never used smokeless tobacco. She reports that she does not drink alcohol or use drugs.   Family History   Problem Relation Age of Onset   • Heart disease Mother    • Stroke Mother    • Heart disease Father    • Hypertension Father    • Heart disease Brother    • Hypertension Brother        Review of Systems  Constitutional: No wt loss, fever, fatigue  Gastrointestinal: No nausea, abdominal pain  Behavioral/Psych: No insomnia or anxiety   Cardiovascular no chest pains or tightness no chest  Objective:       Physical Exam  /77   Pulse 71   Ht 161.3 cm (63.5\")   Wt 83.5 kg (184 lb)   BMI 32.08 kg/m²   General appearance: No acute changes   Neck: Trachea midline; NECK, supple, no thyromegaly or lymphadenopathy   Lungs: Normal size and shape, normal breath sounds, equal distribution " of air, no rales and rhonchi   CV: S1-S2 regular, no murmurs, no rub, no gallop   Abdomen: Soft, non-tender; no masses , no abnormal abdominal sounds   Extremities: No deformity , normal color , no peripheral edema   Skin: Normal temperature, turgor and texture; no rash, ulcers            ECG 12 Lead  Date/Time: 11/7/2019 1:30 PM  Performed by: Malina Breen MD  Authorized by: Malina Breen MD   Comparison: compared with previous ECG   Similar to previous ECG  Rhythm: sinus rhythm              Echocardiogram:        Current Outpatient Medications:   •  ALBUTEROL IN, Inhale 1 inhaler As Needed., Disp: , Rfl:   •  aspirin 81 MG tablet, Take 81 mg by mouth Daily., Disp: , Rfl:   •  BYSTOLIC 2.5 MG tablet, TAKE 1 TABLET DAILY, Disp: 90 tablet, Rfl: 1  •  Calcium Carb-Cholecalciferol 600-200 MG-UNIT tablet, Take  by mouth., Disp: , Rfl:   •  carbidopa-levodopa (SINEMET)  MG per tablet, carbidopa 25 mg-levodopa 100 mg tablet, Disp: , Rfl:   •  cetirizine (zyrTEC) 10 MG tablet, Take 10 mg by mouth Daily., Disp: , Rfl:   •  Cholecalciferol (VITAMIN D) 400 UNIT/ML liquid, Take 5 drops by mouth Daily., Disp: , Rfl:   •  Coenzyme Q10-Fish Oil-Vit E (CO-Q 10 OMEGA-3 FISH OIL PO), Take 1 tablet by mouth Daily., Disp: , Rfl:   •  dimenhyDRINATE (DRAMAMINE) 50 MG tablet, Take 50 mg by mouth At Night As Needed for movement disorders., Disp: , Rfl:   •  magnesium oxide (MAG-OX) 400 MG tablet, Take 400 mg by mouth 2 (Two) Times a Day., Disp: , Rfl:   •  meclizine (ANTIVERT) 25 MG tablet, Take 25 mg by mouth daily., Disp: , Rfl:   •  Multiple Vitamins-Minerals (HAIR SKIN AND NAILS FORMULA PO), Take 1 tablet by mouth Daily., Disp: , Rfl:   •  Probiotic Product (SOLUBLE FIBER/PROBIOTICS PO), Take 1 tablet by mouth Daily., Disp: , Rfl:   •  Unable to find, Take 1 each by mouth 1 (One) Time. FOOD ENZYMES, Disp: , Rfl:   •  Unable to find, Take 1 each by mouth 1 (One) Time. GLUTEN DIGEST, Disp: , Rfl:   •  Unable to  find, Take 1 each by mouth 3 (Three) Times a Day. HEMOVITE, Disp: , Rfl:   •  Unable to find, Take 1 each by mouth 2 (Two) Times a Day. INSTAFLEX, Disp: , Rfl:   •  Unable to find, Take 100 each by mouth 1 (One) Time. 5HTP, Disp: , Rfl:   •  Unable to find, Take 1 each by mouth 2 (Two) Times a Day Before Meals. LECTIN DEFENSE, Disp: , Rfl:   •  vitamin E 400 UNIT capsule, Take 400 Units by mouth Daily., Disp: , Rfl:   •  nitroglycerin (NITROSTAT) 0.4 MG SL tablet, Place 1 tablet under the tongue Every 5 (Five) Minutes As Needed for Chest Pain., Disp: 25 tablet, Rfl: 5   Assessment:        Patient Active Problem List   Diagnosis   • Abnormal cardiovascular stress test   • Benign essential hypertension   • Chest pain   • Coronary arteriosclerosis in native artery   • Presence of stent in coronary artery               Plan:            ICD-10-CM ICD-9-CM   1. Coronary arteriosclerosis in native artery I25.10 414.01   2. Benign essential hypertension I10 401.1   3. Chest pain on breathing R07.1 786.52     1. Coronary arteriosclerosis in native artery  Kristi Ibrahim with coronary artery disease has no angina pectoris    Risk reduction for the coronary artery disease, controlling the blood pressure, blood sugar management, cholesterol management, exercise, stress management, and proper compliance with medications and follow-up has been discussed      2. Benign essential hypertension  Blood pressure controlled    3. Chest pain on breathing  Atypical       cv stable    See in 1 yr  COUNSELING:    Kristi Reed was given to patient for the following topics: diagnostic results, risk factor reductions, impressions, risks and benefits of treatment options and importance of treatment compliance .       SMOKING COUNSELING:    [unfilled]    Dictated using Dragon dictation

## 2020-01-13 RX ORDER — NEBIVOLOL HYDROCHLORIDE 2.5 MG/1
TABLET ORAL
Qty: 90 TABLET | Refills: 1 | Status: SHIPPED | OUTPATIENT
Start: 2020-01-13 | End: 2020-06-30

## 2020-06-30 RX ORDER — NEBIVOLOL HYDROCHLORIDE 2.5 MG/1
TABLET ORAL
Qty: 90 TABLET | Refills: 0 | Status: SHIPPED | OUTPATIENT
Start: 2020-06-30 | End: 2020-09-28

## 2020-07-31 ENCOUNTER — OFFICE VISIT CONVERTED (OUTPATIENT)
Dept: NEUROSURGERY | Facility: CLINIC | Age: 70
End: 2020-07-31
Attending: PHYSICIAN ASSISTANT

## 2020-08-07 RX ORDER — FAMOTIDINE 40 MG/1
40 TABLET, FILM COATED ORAL
COMMUNITY
End: 2022-02-14 | Stop reason: SDUPTHER

## 2020-08-07 RX ORDER — DICLOFENAC SODIUM 75 MG/1
75 TABLET, DELAYED RELEASE ORAL
COMMUNITY
End: 2021-04-26

## 2020-08-07 RX ORDER — FLUTICASONE PROPIONATE 110 UG/1
AEROSOL, METERED RESPIRATORY (INHALATION)
COMMUNITY
End: 2022-05-25 | Stop reason: SDUPTHER

## 2020-08-07 RX ORDER — TRAMADOL HYDROCHLORIDE 50 MG/1
50 TABLET ORAL
COMMUNITY
End: 2022-02-14

## 2020-08-07 RX ORDER — BACLOFEN 5 MG/1
TABLET ORAL
COMMUNITY
End: 2020-11-20

## 2020-08-10 ENCOUNTER — OFFICE VISIT (OUTPATIENT)
Dept: CARDIOLOGY | Facility: CLINIC | Age: 70
End: 2020-08-10

## 2020-08-10 VITALS
WEIGHT: 175 LBS | BODY MASS INDEX: 31.01 KG/M2 | HEIGHT: 63 IN | SYSTOLIC BLOOD PRESSURE: 150 MMHG | HEART RATE: 76 BPM | DIASTOLIC BLOOD PRESSURE: 90 MMHG

## 2020-08-10 DIAGNOSIS — R07.2 PRECORDIAL PAIN: ICD-10-CM

## 2020-08-10 DIAGNOSIS — Z01.818 PRE-OPERATIVE CLEARANCE: ICD-10-CM

## 2020-08-10 DIAGNOSIS — I25.10 CORONARY ARTERIOSCLEROSIS IN NATIVE ARTERY: Primary | ICD-10-CM

## 2020-08-10 DIAGNOSIS — I10 BENIGN ESSENTIAL HYPERTENSION: ICD-10-CM

## 2020-08-10 PROCEDURE — 93000 ELECTROCARDIOGRAM COMPLETE: CPT | Performed by: INTERNAL MEDICINE

## 2020-08-10 PROCEDURE — 99213 OFFICE O/P EST LOW 20 MIN: CPT | Performed by: INTERNAL MEDICINE

## 2020-08-10 NOTE — PROGRESS NOTES
" Subjective:        Kristi Ibrahim is a 70 y.o. female who here for follow up    CC  Missing beats  HPI  70-year-old female with known history of coronary artery disease benign essential arterial hypertension atypical chest pain has been complaining of the missing beats with no chest pains or tightness in the chest     Problem List Items Addressed This Visit        Cardiovascular and Mediastinum    Benign essential hypertension    Coronary arteriosclerosis in native artery - Primary       Nervous and Auditory    Chest pain        .    The following portions of the patient's history were reviewed and updated as appropriate: allergies, current medications, past family history, past medical history, past social history, past surgical history and problem list.    Past Medical History:   Diagnosis Date   • Arthritis    • Asthma     \"ALLERGIC\"   • Coronary artery disease    • Fibromyalgia    • Hyperlipidemia    • Hypertension    • Migraines    • Mitral valve insufficiency    • Parkinson's disease (CMS/HCC)    • Tricuspid regurgitation      reports that she has never smoked. She has never used smokeless tobacco. She reports that she does not drink alcohol or use drugs.   Family History   Problem Relation Age of Onset   • Heart disease Mother    • Stroke Mother    • Heart disease Father    • Hypertension Father    • Heart disease Brother    • Hypertension Brother        Review of Systems  Constitutional: No wt loss, fever, fatigue  Gastrointestinal: No nausea, abdominal pain  Behavioral/Psych: No insomnia or anxiety   Cardiovascular palpitation  Objective:       Physical Exam  /90   Pulse 76   Ht 160 cm (63\")   Wt 79.4 kg (175 lb)   BMI 31.00 kg/m²   General appearance: No acute changes   Neck: Trachea midline; NECK, supple, no thyromegaly or lymphadenopathy   Lungs: Normal size and shape, normal breath sounds, equal distribution of air, no rales and rhonchi   CV: S1-S2 regular, no murmurs, no rub, no gallop "   Abdomen: Soft, non-tender; no masses , no abnormal abdominal sounds   Extremities: No deformity , normal color , no peripheral edema   Skin: Normal temperature, turgor and texture; no rash, ulcers            ECG 12 Lead  Date/Time: 8/10/2020 2:07 PM  Performed by: Malina Breen MD  Authorized by: Malina Breen MD   Comparison: compared with previous ECG   Similar to previous ECG  Rhythm: sinus rhythm  ST Flattening: all    Clinical impression: non-specific ECG              Echocardiogram:        Current Outpatient Medications:   •  ALBUTEROL IN, Inhale 1 inhaler As Needed., Disp: , Rfl:   •  aspirin 81 MG tablet, Take 81 mg by mouth Daily., Disp: , Rfl:   •  Baclofen 5 MG tablet, , Disp: , Rfl:   •  BYSTOLIC 2.5 MG tablet, TAKE 1 TABLET DAILY, Disp: 90 tablet, Rfl: 0  •  Calcium Carb-Cholecalciferol 600-200 MG-UNIT tablet, Take  by mouth., Disp: , Rfl:   •  carbidopa-levodopa (SINEMET)  MG per tablet, carbidopa 25 mg-levodopa 100 mg tablet, Disp: , Rfl:   •  carbidopa-levodopa (SINEMET)  MG per tablet, Take 1.5 tablets by mouth 3 (Three) Times a Day., Disp: , Rfl:   •  cetirizine (zyrTEC) 10 MG tablet, Take 10 mg by mouth Daily., Disp: , Rfl:   •  Cholecalciferol (VITAMIN D) 400 UNIT/ML liquid, Take 5 drops by mouth Daily., Disp: , Rfl:   •  Cholecalciferol 10 MCG/ML liquid liquid, Take 400 Units by mouth Daily., Disp: , Rfl:   •  Coenzyme Q10-Fish Oil-Vit E (CO-Q 10 OMEGA-3 FISH OIL PO), Take 1 tablet by mouth Daily., Disp: , Rfl:   •  diclofenac (VOLTAREN) 75 MG EC tablet, 75 mg., Disp: , Rfl:   •  dimenhyDRINATE (DRAMAMINE) 50 MG tablet, Take 50 mg by mouth At Night As Needed for movement disorders., Disp: , Rfl:   •  famotidine (Pepcid) 40 MG tablet, 40 mg., Disp: , Rfl:   •  Ferrous Fumarate 325 (106 Fe) MG tablet, Take 1 tablet by mouth., Disp: , Rfl:   •  fluticasone (Flovent HFA) 110 MCG/ACT inhaler, , Disp: , Rfl:   •  magnesium oxide (MAG-OX) 400 MG tablet, Take 400 mg by  mouth 2 (Two) Times a Day., Disp: , Rfl:   •  meclizine (ANTIVERT) 25 MG tablet, Take 25 mg by mouth daily., Disp: , Rfl:   •  Multiple Vitamins-Minerals (HAIR SKIN AND NAILS FORMULA PO), Take 1 tablet by mouth Daily., Disp: , Rfl:   •  nitroglycerin (NITROSTAT) 0.4 MG SL tablet, Place 1 tablet under the tongue Every 5 (Five) Minutes As Needed for Chest Pain., Disp: 25 tablet, Rfl: 5  •  Probiotic Product (SOLUBLE FIBER/PROBIOTICS PO), Take 1 tablet by mouth Daily., Disp: , Rfl:   •  traMADol (ULTRAM) 50 MG tablet, 50 mg., Disp: , Rfl:   •  Unable to find, Take 1 each by mouth 1 (One) Time. FOOD ENZYMES, Disp: , Rfl:   •  Unable to find, Take 1 each by mouth 1 (One) Time. GLUTEN DIGEST, Disp: , Rfl:   •  Unable to find, Take 1 each by mouth 3 (Three) Times a Day. HEMOVITE, Disp: , Rfl:   •  Unable to find, Take 1 each by mouth 2 (Two) Times a Day. INSTAFLEX, Disp: , Rfl:   •  Unable to find, Take 100 each by mouth 1 (One) Time. 5HTP, Disp: , Rfl:   •  Unable to find, Take 1 each by mouth 2 (Two) Times a Day Before Meals. LECTIN DEFENSE, Disp: , Rfl:   •  vitamin E 400 UNIT capsule, Take 400 Units by mouth Daily., Disp: , Rfl:    Assessment:        Patient Active Problem List   Diagnosis   • Abnormal cardiovascular stress test   • Benign essential hypertension   • Chest pain   • Coronary arteriosclerosis in native artery   • Presence of stent in coronary artery               Plan:            ICD-10-CM ICD-9-CM   1. Coronary arteriosclerosis in native artery I25.10 414.01   2. Benign essential hypertension I10 401.1   3. Precordial pain R07.2 786.51     1. Coronary arteriosclerosis in native artery  No angina pectoris    2. Benign essential hypertension  Pressure is better at home    3. Precordial pain  Patient refuses stress test*       bp better at home    Chol good  Can nort do ETT    Refuses nuclear    See in 1 yr    COUNSELING:    Kristi Reed was given to patient for the following topics: diagnostic  results, risk factor reductions, impressions, risks and benefits of treatment options and importance of treatment compliance .       SMOKING COUNSELING:    [unfilled]    Dictated using Dragon dictation

## 2020-08-13 ENCOUNTER — OFFICE VISIT CONVERTED (OUTPATIENT)
Dept: PULMONOLOGY | Facility: CLINIC | Age: 70
End: 2020-08-13
Attending: INTERNAL MEDICINE

## 2020-08-20 ENCOUNTER — HOSPITAL ENCOUNTER (OUTPATIENT)
Dept: MRI IMAGING | Facility: HOSPITAL | Age: 70
Discharge: HOME | End: 2020-08-20
Attending: PHYSICIAN ASSISTANT

## 2020-08-25 ENCOUNTER — OFFICE VISIT CONVERTED (OUTPATIENT)
Dept: NEUROSURGERY | Facility: CLINIC | Age: 70
End: 2020-08-25
Attending: NEUROLOGICAL SURGERY

## 2020-08-28 ENCOUNTER — CONVERSION ENCOUNTER (OUTPATIENT)
Dept: OTOLARYNGOLOGY | Facility: CLINIC | Age: 70
End: 2020-08-28

## 2020-08-28 ENCOUNTER — OFFICE VISIT CONVERTED (OUTPATIENT)
Dept: OTOLARYNGOLOGY | Facility: CLINIC | Age: 70
End: 2020-08-28
Attending: OTOLARYNGOLOGY

## 2020-09-28 RX ORDER — NEBIVOLOL HYDROCHLORIDE 2.5 MG/1
TABLET ORAL
Qty: 90 TABLET | Refills: 0 | Status: SHIPPED | OUTPATIENT
Start: 2020-09-28 | End: 2020-12-28

## 2020-10-16 ENCOUNTER — HOSPITAL ENCOUNTER (OUTPATIENT)
Dept: PREADMISSION TESTING | Facility: HOSPITAL | Age: 70
Discharge: HOME | End: 2020-10-16
Attending: INTERNAL MEDICINE

## 2020-10-17 LAB — SARS-COV-2 RNA SPEC QL NAA+PROBE: NOT DETECTED

## 2020-10-21 ENCOUNTER — HOSPITAL ENCOUNTER (OUTPATIENT)
Dept: CARDIOLOGY | Facility: HOSPITAL | Age: 70
Discharge: HOME | End: 2020-10-21
Attending: INTERNAL MEDICINE

## 2020-11-10 ENCOUNTER — OFFICE VISIT CONVERTED (OUTPATIENT)
Dept: PULMONOLOGY | Facility: CLINIC | Age: 70
End: 2020-11-10
Attending: INTERNAL MEDICINE

## 2020-11-17 ENCOUNTER — HOSPITAL ENCOUNTER (OUTPATIENT)
Dept: CARDIOLOGY | Facility: HOSPITAL | Age: 70
Discharge: HOME | End: 2020-11-17

## 2020-11-17 ENCOUNTER — HOSPITAL ENCOUNTER (OUTPATIENT)
Dept: CARDIOLOGY | Facility: HOSPITAL | Age: 70
Discharge: HOME | End: 2020-11-17
Admitting: INTERNAL MEDICINE

## 2020-11-17 VITALS
RESPIRATION RATE: 16 BRPM | BODY MASS INDEX: 27.66 KG/M2 | WEIGHT: 162 LBS | HEART RATE: 75 BPM | SYSTOLIC BLOOD PRESSURE: 127 MMHG | HEIGHT: 64 IN | DIASTOLIC BLOOD PRESSURE: 82 MMHG | OXYGEN SATURATION: 100 %

## 2020-11-17 VITALS
BODY MASS INDEX: 31.01 KG/M2 | HEIGHT: 63 IN | SYSTOLIC BLOOD PRESSURE: 121 MMHG | HEART RATE: 75 BPM | WEIGHT: 175 LBS | DIASTOLIC BLOOD PRESSURE: 81 MMHG

## 2020-11-17 LAB
AORTIC DIMENSIONLESS INDEX: 0.8 (DI)
ASCENDING AORTA: 2.8 CM
BH CV ECHO MEAS - ACS: 1.6 CM
BH CV ECHO MEAS - AO MAX PG (FULL): 2.9 MMHG
BH CV ECHO MEAS - AO MAX PG: 6.6 MMHG
BH CV ECHO MEAS - AO MEAN PG (FULL): 1.6 MMHG
BH CV ECHO MEAS - AO MEAN PG: 3.6 MMHG
BH CV ECHO MEAS - AO ROOT AREA (BSA CORRECTED): 1.3
BH CV ECHO MEAS - AO ROOT AREA: 4.6 CM^2
BH CV ECHO MEAS - AO ROOT DIAM: 2.4 CM
BH CV ECHO MEAS - AO V2 MAX: 128.1 CM/SEC
BH CV ECHO MEAS - AO V2 MEAN: 87.9 CM/SEC
BH CV ECHO MEAS - AO V2 VTI: 25.7 CM
BH CV ECHO MEAS - ASC AORTA: 2.8 CM
BH CV ECHO MEAS - AVA(I,A): 2.2 CM^2
BH CV ECHO MEAS - AVA(I,D): 2.2 CM^2
BH CV ECHO MEAS - AVA(V,A): 2.1 CM^2
BH CV ECHO MEAS - AVA(V,D): 2.1 CM^2
BH CV ECHO MEAS - BSA(HAYCOCK): 1.9 M^2
BH CV ECHO MEAS - BSA: 1.8 M^2
BH CV ECHO MEAS - BZI_BMI: 31 KILOGRAMS/M^2
BH CV ECHO MEAS - BZI_METRIC_HEIGHT: 160 CM
BH CV ECHO MEAS - BZI_METRIC_WEIGHT: 79.4 KG
BH CV ECHO MEAS - EDV(CUBED): 60.5 ML
BH CV ECHO MEAS - EDV(MOD-SP2): 106 ML
BH CV ECHO MEAS - EDV(MOD-SP4): 111 ML
BH CV ECHO MEAS - EDV(TEICH): 66.9 ML
BH CV ECHO MEAS - EF(CUBED): 67.8 %
BH CV ECHO MEAS - EF(MOD-BP): 61.9 %
BH CV ECHO MEAS - EF(MOD-SP2): 65.1 %
BH CV ECHO MEAS - EF(MOD-SP4): 60.4 %
BH CV ECHO MEAS - EF(TEICH): 59.9 %
BH CV ECHO MEAS - ESV(CUBED): 19.5 ML
BH CV ECHO MEAS - ESV(MOD-SP2): 37 ML
BH CV ECHO MEAS - ESV(MOD-SP4): 44 ML
BH CV ECHO MEAS - ESV(TEICH): 26.8 ML
BH CV ECHO MEAS - FS: 31.4 %
BH CV ECHO MEAS - IVS/LVPW: 0.96
BH CV ECHO MEAS - IVSD: 0.84 CM
BH CV ECHO MEAS - LAT PEAK E' VEL: 7.4 CM/SEC
BH CV ECHO MEAS - LV DIASTOLIC VOL/BSA (35-75): 60.8 ML/M^2
BH CV ECHO MEAS - LV MASS(C)D: 98.8 GRAMS
BH CV ECHO MEAS - LV MASS(C)DI: 54.1 GRAMS/M^2
BH CV ECHO MEAS - LV MAX PG: 3.7 MMHG
BH CV ECHO MEAS - LV MEAN PG: 2 MMHG
BH CV ECHO MEAS - LV SYSTOLIC VOL/BSA (12-30): 24.1 ML/M^2
BH CV ECHO MEAS - LV V1 MAX: 96 CM/SEC
BH CV ECHO MEAS - LV V1 MEAN: 68 CM/SEC
BH CV ECHO MEAS - LV V1 VTI: 20.3 CM
BH CV ECHO MEAS - LVIDD: 3.9 CM
BH CV ECHO MEAS - LVIDS: 2.7 CM
BH CV ECHO MEAS - LVLD AP2: 7.5 CM
BH CV ECHO MEAS - LVLD AP4: 7.9 CM
BH CV ECHO MEAS - LVLS AP2: 7 CM
BH CV ECHO MEAS - LVLS AP4: 6.4 CM
BH CV ECHO MEAS - LVOT AREA (M): 2.8 CM^2
BH CV ECHO MEAS - LVOT AREA: 2.8 CM^2
BH CV ECHO MEAS - LVOT DIAM: 1.9 CM
BH CV ECHO MEAS - LVPWD: 0.87 CM
BH CV ECHO MEAS - MED PEAK E' VEL: 5.1 CM/SEC
BH CV ECHO MEAS - MR MAX PG: 86.4 MMHG
BH CV ECHO MEAS - MR MAX VEL: 464.8 CM/SEC
BH CV ECHO MEAS - MV A DUR: 0.13 SEC
BH CV ECHO MEAS - MV A MAX VEL: 68.4 CM/SEC
BH CV ECHO MEAS - MV DEC SLOPE: 281.7 CM/SEC^2
BH CV ECHO MEAS - MV DEC TIME: 165 SEC
BH CV ECHO MEAS - MV E MAX VEL: 45.1 CM/SEC
BH CV ECHO MEAS - MV E/A: 0.66
BH CV ECHO MEAS - MV MAX PG: 46.4 MMHG
BH CV ECHO MEAS - MV MEAN PG: 67.6 MMHG
BH CV ECHO MEAS - MV P1/2T MAX VEL: 80.4 CM/SEC
BH CV ECHO MEAS - MV P1/2T: 83.5 MSEC
BH CV ECHO MEAS - MV V2 MAX: 281.2 CM/SEC
BH CV ECHO MEAS - MV V2 MEAN: 396.7 CM/SEC
BH CV ECHO MEAS - MV V2 VTI: 181.6 CM
BH CV ECHO MEAS - MVA P1/2T LCG: 2.7 CM^2
BH CV ECHO MEAS - MVA(P1/2T): 2.6 CM^2
BH CV ECHO MEAS - MVA(VTI): 0.31 CM^2
BH CV ECHO MEAS - PA ACC TIME: 0.11 SEC
BH CV ECHO MEAS - PA MAX PG (FULL): 2 MMHG
BH CV ECHO MEAS - PA MAX PG: 2.7 MMHG
BH CV ECHO MEAS - PA PR(ACCEL): 28.3 MMHG
BH CV ECHO MEAS - PA V2 MAX: 82.8 CM/SEC
BH CV ECHO MEAS - PI END-D VEL: 105.9 CM/SEC
BH CV ECHO MEAS - PULM A REVS DUR: 0.1 SEC
BH CV ECHO MEAS - PULM A REVS VEL: 23.3 CM/SEC
BH CV ECHO MEAS - PULM DIAS VEL: 27.2 CM/SEC
BH CV ECHO MEAS - PULM S/D: 1.5
BH CV ECHO MEAS - PULM SYS VEL: 40.7 CM/SEC
BH CV ECHO MEAS - PVA(V,A): 1.5 CM^2
BH CV ECHO MEAS - PVA(V,D): 1.5 CM^2
BH CV ECHO MEAS - QP/QS: 0.42
BH CV ECHO MEAS - RAP SYSTOLE: 3 MMHG
BH CV ECHO MEAS - RV MAX PG: 0.76 MMHG
BH CV ECHO MEAS - RV MEAN PG: 0.36 MMHG
BH CV ECHO MEAS - RV V1 MAX: 43.7 CM/SEC
BH CV ECHO MEAS - RV V1 MEAN: 27.2 CM/SEC
BH CV ECHO MEAS - RV V1 VTI: 8.5 CM
BH CV ECHO MEAS - RVOT AREA: 2.8 CM^2
BH CV ECHO MEAS - RVOT DIAM: 1.9 CM
BH CV ECHO MEAS - RVSP: 28 MMHG
BH CV ECHO MEAS - SI(AO): 64.6 ML/M^2
BH CV ECHO MEAS - SI(CUBED): 22.4 ML/M^2
BH CV ECHO MEAS - SI(LVOT): 30.6 ML/M^2
BH CV ECHO MEAS - SI(MOD-SP2): 37.8 ML/M^2
BH CV ECHO MEAS - SI(MOD-SP4): 36.7 ML/M^2
BH CV ECHO MEAS - SI(TEICH): 22 ML/M^2
BH CV ECHO MEAS - SUP REN AO DIAM: 2.2 CM
BH CV ECHO MEAS - SV(AO): 118.1 ML
BH CV ECHO MEAS - SV(CUBED): 41 ML
BH CV ECHO MEAS - SV(LVOT): 56 ML
BH CV ECHO MEAS - SV(MOD-SP2): 69 ML
BH CV ECHO MEAS - SV(MOD-SP4): 67 ML
BH CV ECHO MEAS - SV(RVOT): 23.5 ML
BH CV ECHO MEAS - SV(TEICH): 40.1 ML
BH CV ECHO MEAS - TAPSE (>1.6): 1.9 CM
BH CV ECHO MEAS - TR MAX VEL: 247.6 CM/SEC
BH CV ECHO MEASUREMENTS AVERAGE E/E' RATIO: 7.22
BH CV VAS BP LEFT ARM: NORMAL MMHG
BH CV XLRA - RV BASE: 3.1 CM
BH CV XLRA - RV LENGTH: 5.8 CM
BH CV XLRA - RV MID: 2.8 CM
BH CV XLRA - TDI S': 10.1 CM/SEC
LEFT ATRIUM VOLUME INDEX: 19.2 ML/M2
MAXIMAL PREDICTED HEART RATE: 150 BPM
SINUS: 2.7 CM
STJ: 2.4 CM
STRESS TARGET HR: 128 BPM

## 2020-11-17 PROCEDURE — 25010000002 PERFLUTREN (DEFINITY) 8.476 MG IN SODIUM CHLORIDE 0.9 % 10 ML INJECTION: Performed by: INTERNAL MEDICINE

## 2020-11-17 PROCEDURE — 25010000002 REGADENOSON 0.4 MG/5ML SOLUTION: Performed by: INTERNAL MEDICINE

## 2020-11-17 PROCEDURE — A9500 TC99M SESTAMIBI: HCPCS | Performed by: INTERNAL MEDICINE

## 2020-11-17 PROCEDURE — 93018 CV STRESS TEST I&R ONLY: CPT | Performed by: INTERNAL MEDICINE

## 2020-11-17 PROCEDURE — 0 TECHNETIUM SESTAMIBI: Performed by: INTERNAL MEDICINE

## 2020-11-17 PROCEDURE — 93306 TTE W/DOPPLER COMPLETE: CPT | Performed by: INTERNAL MEDICINE

## 2020-11-17 PROCEDURE — 93306 TTE W/DOPPLER COMPLETE: CPT

## 2020-11-17 PROCEDURE — 93017 CV STRESS TEST TRACING ONLY: CPT

## 2020-11-17 PROCEDURE — 78452 HT MUSCLE IMAGE SPECT MULT: CPT

## 2020-11-17 PROCEDURE — 93016 CV STRESS TEST SUPVJ ONLY: CPT | Performed by: NURSE PRACTITIONER

## 2020-11-17 PROCEDURE — 78452 HT MUSCLE IMAGE SPECT MULT: CPT | Performed by: INTERNAL MEDICINE

## 2020-11-17 RX ORDER — SUMATRIPTAN 100 MG/1
TABLET, FILM COATED ORAL
COMMUNITY
End: 2022-02-14 | Stop reason: SDUPTHER

## 2020-11-17 RX ORDER — ONDANSETRON 4 MG/1
TABLET, FILM COATED ORAL
COMMUNITY

## 2020-11-17 RX ORDER — TRIAMCINOLONE ACETONIDE 1 MG/G
CREAM TOPICAL
COMMUNITY
End: 2023-03-13

## 2020-11-17 RX ADMIN — PERFLUTREN 5 ML: 6.52 INJECTION, SUSPENSION INTRAVENOUS at 09:29

## 2020-11-17 RX ADMIN — TECHNETIUM TC 99M SESTAMIBI 1 DOSE: 1 INJECTION INTRAVENOUS at 07:53

## 2020-11-17 RX ADMIN — TECHNETIUM TC 99M SESTAMIBI 1 DOSE: 1 INJECTION INTRAVENOUS at 11:06

## 2020-11-17 RX ADMIN — REGADENOSON 0.4 MG: 0.08 INJECTION, SOLUTION INTRAVENOUS at 11:06

## 2020-11-19 LAB
BH CV STRESS BP STAGE 1: NORMAL
BH CV STRESS COMMENTS STAGE 1: NORMAL
BH CV STRESS DOSE REGADENOSON STAGE 1: 0.4
BH CV STRESS DURATION MIN STAGE 1: 1
BH CV STRESS DURATION SEC STAGE 1: 0
BH CV STRESS HR STAGE 1: 101
BH CV STRESS O2 STAGE 1: 100
BH CV STRESS PROTOCOL 1: NORMAL
BH CV STRESS RECOVERY BP: NORMAL MMHG
BH CV STRESS RECOVERY HR: 98 BPM
BH CV STRESS RECOVERY O2: 100 %
BH CV STRESS STAGE 1: 1
LV EF NUC BP: 66 %
MAXIMAL PREDICTED HEART RATE: 150 BPM
PERCENT MAX PREDICTED HR: 67.33 %
STRESS BASELINE BP: NORMAL MMHG
STRESS BASELINE HR: 76 BPM
STRESS O2 SAT REST: 100 %
STRESS PERCENT HR: 79 %
STRESS POST ESTIMATED WORKLOAD: 1 METS
STRESS POST EXERCISE DUR MIN: 1 MIN
STRESS POST EXERCISE DUR SEC: 0 SEC
STRESS POST O2 SAT PEAK: 100 %
STRESS POST PEAK BP: NORMAL MMHG
STRESS POST PEAK HR: 101 BPM
STRESS TARGET HR: 128 BPM

## 2020-11-20 NOTE — PROGRESS NOTES
TEST RESULT FOR BACK  SURGERY CLEARANCE    Subjective:        Kristi Ibrahim is a 70 y.o. female who here for follow up    CC  TELE FOR SURG CLERANCE  HPI  70-year-old female with known history of the coronary artery disease, benign essential arterial hypertension as well as atypical chest pain on the telephone for the results and surgical clearance denies any chest pains or tightness in the chest     Problems Addressed this Visit        Cardiovascular and Mediastinum    Benign essential hypertension    Coronary arteriosclerosis in native artery - Primary       Nervous and Auditory    Chest pain      Diagnoses       Codes Comments    Coronary arteriosclerosis in native artery    -  Primary ICD-10-CM: I25.10  ICD-9-CM: 414.01     Benign essential hypertension     ICD-10-CM: I10  ICD-9-CM: 401.1     Precordial pain     ICD-10-CM: R07.2  ICD-9-CM: 786.51         .Interpretation Summary       · Findings consistent with a normal ECG stress test.  · Left ventricular ejection fraction is normal. (Calculated EF = 66%).  · Myocardial perfusion imaging indicates a normal myocardial perfusion study with no evidence of ischemia.  · Impressions are consistent with a low risk study.     Interpretation Summary    · Estimated right ventricular systolic pressure from tricuspid regurgitation is normal (<35 mmHg).  · Calculated left ventricular EF = 61.9% Estimated left ventricular EF was in agreement with the calculated left ventricular EF.  · Left ventricular diastolic function is consistent with (grade Ia w/high LAP) impaired relaxation.  · Calculated left ventricular EF = 61.9%  · There is no evidence of pericardial effusion. .            The following portions of the patient's history were reviewed and updated as appropriate: allergies, current medications, past family history, past medical history, past social history, past surgical history and problem list.    Past Medical History:   Diagnosis Date   • Arthritis    • Asthma      "\"ALLERGIC\"   • Coronary artery disease    • Fibromyalgia    • Hyperlipidemia    • Hypertension    • Lumbar herniated disc    • Migraines    • Mitral valve insufficiency    • Parkinson's disease (CMS/HCC)    • Tricuspid regurgitation      reports that she has never smoked. She has never used smokeless tobacco. She reports that she does not drink alcohol or use drugs.   Family History   Problem Relation Age of Onset   • Heart disease Mother    • Stroke Mother    • Heart disease Father    • Hypertension Father    • Heart disease Brother    • Hypertension Brother        Review of Systems  Constitutional: No wt loss, fever, fatigue  Gastrointestinal: No nausea, abdominal pain  Behavioral/Psych: No insomnia or anxiety   Cardiovascular no chest pains or tightness in the chest  Objective:       Physical Exam  TELE    Procedures      Echocardiogram:        Current Outpatient Medications:   •  ALBUTEROL IN, Inhale 1 inhaler As Needed., Disp: , Rfl:   •  aspirin 81 MG tablet, Take 81 mg by mouth Daily., Disp: , Rfl:   •  Bystolic 2.5 MG tablet, TAKE 1 TABLET DAILY, Disp: 90 tablet, Rfl: 0  •  carbidopa-levodopa (SINEMET)  MG per tablet, Take 1.5 tablets by mouth 3 (Three) Times a Day., Disp: , Rfl:   •  cetirizine (zyrTEC) 10 MG tablet, Take 10 mg by mouth Daily., Disp: , Rfl:   •  Cholecalciferol 10 MCG/ML liquid liquid, Take 400 Units by mouth Daily., Disp: , Rfl:   •  diclofenac (VOLTAREN) 75 MG EC tablet, 75 mg., Disp: , Rfl:   •  dimenhyDRINATE (DRAMAMINE) 50 MG tablet, Take 50 mg by mouth At Night As Needed for movement disorders., Disp: , Rfl:   •  famotidine (Pepcid) 40 MG tablet, 40 mg., Disp: , Rfl:   •  Ferrous Fumarate 325 (106 Fe) MG tablet, Take 1 tablet by mouth., Disp: , Rfl:   •  fluticasone (Flovent HFA) 110 MCG/ACT inhaler, , Disp: , Rfl:   •  magnesium oxide (MAG-OX) 400 MG tablet, Take 400 mg by mouth 2 (Two) Times a Day., Disp: , Rfl:   •  meclizine (ANTIVERT) 25 MG tablet, Take 25 mg by mouth " daily., Disp: , Rfl:   •  Multiple Vitamins-Minerals (HAIR SKIN AND NAILS FORMULA PO), Take 1 tablet by mouth Daily., Disp: , Rfl:   •  nitroglycerin (NITROSTAT) 0.4 MG SL tablet, Place 1 tablet under the tongue Every 5 (Five) Minutes As Needed for Chest Pain., Disp: 25 tablet, Rfl: 5  •  ondansetron (ZOFRAN) 4 MG tablet, ondansetron HCl 4 mg tablet, Disp: , Rfl:   •  Probiotic Product (SOLUBLE FIBER/PROBIOTICS PO), Take 1 tablet by mouth Daily., Disp: , Rfl:   •  SUMAtriptan (IMITREX) 100 MG tablet, sumatriptan 100 mg tablet, Disp: , Rfl:   •  traMADol (ULTRAM) 50 MG tablet, 50 mg., Disp: , Rfl:   •  triamcinolone (KENALOG) 0.1 % cream, triamcinolone acetonide 0.1 % topical cream, Disp: , Rfl:   •  vitamin E 400 UNIT capsule, Take 400 Units by mouth Daily., Disp: , Rfl:    Assessment:        Patient Active Problem List   Diagnosis   • Abnormal cardiovascular stress test   • Benign essential hypertension   • Chest pain   • Coronary arteriosclerosis in native artery   • Presence of stent in coronary artery               Plan:            ICD-10-CM ICD-9-CM   1. Coronary arteriosclerosis in native artery  I25.10 414.01   2. Benign essential hypertension  I10 401.1   3. Precordial pain  R07.2 786.51     1. Coronary arteriosclerosis in native artery  No angina pectoris    2. Benign essential hypertension  Blood pressure under control    3. Precordial pain  Work-up is negative       SEE IN 1 YR    Specificity and sensitivity of the stress test/ cardiac workup has been explained. Pt has been explained if  Symptoms continue please go to ER, and further w/p will be required.    Also explained this does not rule out coronary artery disease or the future events, continue to emphasize on risk reductions for coronary artery disease    Pt also advised to contact PCP for other causes of symptoms      Kristi Ibrahim seen and examined with no clinical signs of angina or chf, pt is cleared for surgery with non modifiable risk  factors.  Kristi YEMI Patrice has been advised to take cardiac meds with sip of water on the day of surgery.    Please use beta blocker for tachycardia perioperatively    Anticoagulation to be managed appropriately    Watch for chest pain, shortness of breath, palpitations, arrhythmias, and significant change in the blood pressure perioperatively,     Please check EKG preop and postop if any questions, notify us if any change in patient's cardiovascular conditions      This patient has consented to a telehealth visit via telephone. The visit was scheduled as a telephone visit to comply with patient safety concerns in accordance with CDC recommendations.  All vitals recorded within this visit are reported by the patient.  I spent  15 minutes in total including but not limited to the 15 minutes spent in direct conversation with this patient.     COUNSELING:    Kristi Reed was given to patient for the following topics: diagnostic results, risk factor reductions, impressions, risks and benefits of treatment options and importance of treatment compliance .       SMOKING COUNSELING:    [unfilled]    Dictated using Dragon dictation

## 2020-11-23 ENCOUNTER — OFFICE VISIT (OUTPATIENT)
Dept: CARDIOLOGY | Facility: CLINIC | Age: 70
End: 2020-11-23

## 2020-11-23 VITALS
HEIGHT: 64 IN | HEART RATE: 80 BPM | BODY MASS INDEX: 27.69 KG/M2 | SYSTOLIC BLOOD PRESSURE: 125 MMHG | WEIGHT: 162.2 LBS | DIASTOLIC BLOOD PRESSURE: 83 MMHG

## 2020-11-23 DIAGNOSIS — R07.2 PRECORDIAL PAIN: ICD-10-CM

## 2020-11-23 DIAGNOSIS — I10 BENIGN ESSENTIAL HYPERTENSION: ICD-10-CM

## 2020-11-23 DIAGNOSIS — I25.10 CORONARY ARTERIOSCLEROSIS IN NATIVE ARTERY: Primary | ICD-10-CM

## 2020-11-23 PROCEDURE — 99442 PR PHYS/QHP TELEPHONE EVALUATION 11-20 MIN: CPT | Performed by: INTERNAL MEDICINE

## 2020-12-28 RX ORDER — NEBIVOLOL HYDROCHLORIDE 2.5 MG/1
TABLET ORAL
Qty: 90 TABLET | Refills: 1 | Status: SHIPPED | OUTPATIENT
Start: 2020-12-28 | End: 2021-06-24

## 2021-04-26 ENCOUNTER — OFFICE VISIT (OUTPATIENT)
Dept: CARDIOLOGY | Facility: CLINIC | Age: 71
End: 2021-04-26

## 2021-04-26 VITALS
WEIGHT: 177 LBS | HEIGHT: 64 IN | SYSTOLIC BLOOD PRESSURE: 122 MMHG | HEART RATE: 81 BPM | BODY MASS INDEX: 30.22 KG/M2 | DIASTOLIC BLOOD PRESSURE: 82 MMHG

## 2021-04-26 DIAGNOSIS — R07.2 PRECORDIAL PAIN: ICD-10-CM

## 2021-04-26 DIAGNOSIS — I49.5 SSS (SICK SINUS SYNDROME) (HCC): ICD-10-CM

## 2021-04-26 DIAGNOSIS — I10 BENIGN ESSENTIAL HYPERTENSION: Primary | ICD-10-CM

## 2021-04-26 DIAGNOSIS — Z95.5 PRESENCE OF STENT IN CORONARY ARTERY: ICD-10-CM

## 2021-04-26 DIAGNOSIS — R00.2 PALPITATIONS: ICD-10-CM

## 2021-04-26 PROCEDURE — 93000 ELECTROCARDIOGRAM COMPLETE: CPT | Performed by: INTERNAL MEDICINE

## 2021-04-26 PROCEDURE — 99213 OFFICE O/P EST LOW 20 MIN: CPT | Performed by: INTERNAL MEDICINE

## 2021-05-04 PROBLEM — I49.5 SSS (SICK SINUS SYNDROME): Status: ACTIVE | Noted: 2021-05-04

## 2021-05-13 NOTE — PROGRESS NOTES
Progress Note      Patient Name: Kristi Ibrahim   Patient ID: [id number]   Sex: Female   YOB: 1950    Primary Care Provider: Roberto Carlos Reddy MD   Referring Provider: Roberto Carlos Reddy MD    Visit Date: August 25, 2020    Provider: Efren Huang MD   Location: [facility]   Location Address: [address] [place] [address]  [id number]   Location Phone: [phone]          Chief Complaint     Here to discuss MRI results. Complains of low back and left greater than right leg pain.       History Of Present Illness     She has lower back and left greater than right leg pain. The pain worsens with standing and walking. She is in PT now for dizziness and balance.       Past Medical History  Anemia, Unspecified; Arthritis; Asthma; Chest pain; Degenerative Disc Disease ; GERD (gastroesophageal reflux disease); Hemangioma of bone; Hepatitis A; Leg pain; Leg swelling; Leg weakness, bilateral; Low back pain; Lumbar stenosis; Muscle cramps; Parkinson's Disease; Seasonal allergies; Shortness of Breath; Spondylolisthesis of cervical region; Spondylolisthesis of lumbar region; Voice hoarseness         Past Surgical History  Carpal Tunnel Release; Cholecystecomy; Colonoscopy; Hand surgery; Joint Surgery; Tonsillectomy         Medication List  5-hydroxytryptophan (5-HTP) 50 mg oral capsule; baclofen 5 mg oral tablet; Bystolic 2.5 mg oral tablet; carbidopa-levodopa  mg oral tablet; cetirizine 10 mg oral tablet; ferrous sulfate 325 mg (65 mg iron) oral tablet,delayed release (DR/EC); guaifenesin 600 mg oral tablet extended release 12hr; Imitrex 100 mg oral tablet; ipratropium bromide 0.03 % nasal spray,non-aerosol; iron 325 mg (65 mg iron) oral tablet; magnesium 200 mg oral tablet; meclizine 25 mg oral tablet; Mucinex 600 mg oral tablet extended release 12hr; Multiple Vitamins oral tablet; Omega DHA 92 mg (43 mg-22 [id number]) oral tablet,chewable; Pepcid 40 mg oral tablet; ProAir HFA 90  mcg/actuation inhalation HFA aerosol inhaler; Probiotic 15 billion cell oral capsule; tramadol 50 mg oral tablet; Vitamin D3 oral; vitamin E 400 unit oral capsule         Allergy List  Acetaminophen adverse reaction; Codiene; I.V. Dye; ibuprofen; IVP Dye; NSAIDS; tramadol         Family Medical History  Family history of breast cancer; Family history of Arthritis; Family history of stroke; Family history of heart disease; Family history of stomach cancer; Family history of malignant neoplasm of upper lobe bronchus or lung         Social History  Alcohol (Never); lives with spouse; ; Recreational Drug Use (Never); Retired; Tobacco (Never)         Review of Systems  · Constitutional  o Admits  o : fatigue  o Denies  o : chills, excessive sweating, fever, sycope/passing out, weight gain, weight loss  · Eyes  o Denies  o : changes in vision, blurry vision, double vision  · HENT  o Admits  o : nasal congestion, seasonal allergies  o Denies  o : loss of hearing, ringing in the ears, ear aches, sore throat, sinus pain, nose bleeds  · Cardiovascular  o Admits  o : anemia  o Denies  o : blood clots, swollen legs, easy burising or bleeding, transfusions  · Respiratory  o Admits  o : shortness of breath, dry cough  o Denies  o : productive cough, pneumonia, COPD  · Gastrointestinal  o Admits  o : reflux  o Denies  o : difficulty swallowing  · Genitourinary  o Denies  o : incontinence  · Neurologic  o Admits  o : tremor, loss of balance, falls, dizziness/vertigo, numbness/tingling/paresthesia , weakness  o Denies  o : headache, seizure, stroke, difficulty with sleep, difficulty with coordination, difficulty with dexterity  · Musculoskeletal  o Admits  o : muscle aches, joint pain, weakness, spasms, pain radiating in leg, low back pain  o Denies  o : neck stiffness/pain, swollen lymph nodes, sciatica, pain radiating in arm  · Endocrine  o Denies  o : diabetes, thyroid disorder  · Psychiatric  o Denies  o : anxiety,  "depression  · All Others Negative      Vitals  Date Time BP Position Site L\R Cuff Size HR RR TEMP (F) WT  HT  BMI kg/m2 BSA m2 O2 Sat HC       08/25/2020 01:17 PM        96.9 173lbs 5oz 5'  3\" 30.7 1.87           Physical Examination  · Constitutional  o Appearance  o : well-nourished, well developed, alert, in no acute distress  · Respiratory  o Respiratory Effort  o : breathing unlabored  · Cardiovascular  o Peripheral Vascular System  o :   § Extremities  § : no cyanosis  · Psychiatric  o Mood and Affect  o : mood normal, affect appropriate              Assessment  · Spondylolisthesis of lumbar region     738.4/M43.16  · Lumbar stenosis     724.02/M48.061  L4-5 most notably  · Low back pain     724.2/M54.5      Plan  · Medications  o Medications have been Reconciled  o Transition of Care or Provider Policy  · Instructions  o Her options include continued PT vs. lumbar 4-5 minimally invasive laminectomy. She will discuss this with her  and let us know how she would like to proceed.             Electronically Signed by: Efren Huang MD -Author on August 25, 2020 02:12:53 PM  "

## 2021-05-13 NOTE — PROGRESS NOTES
"   Progress Note      Patient Name: Kristi Ibrahim   Patient ID: [id number]   Sex: Female   YOB: 1950    Primary Care Provider: Roberto Carlos Reddy MD   Referring Provider: Roberto Carlos Reddy MD    Visit Date: August 28, 2020    Provider: Jameson Lira MD   Location: [facility]   Location Address: [address] [place] [address], [address]  [id number]   Location Phone: [phone]          Chief Complaint     \"I am still hoarse.\"       History Of Present Illness     Kristi Ibrahim is a 70 year old /White female with past medical history significant for hyperlipidemia, spinal stenosis, asthma, and Parkinson's disease who returns today for follow-up of hoarseness.  She was originally seen on 11/6/2019 at which time she reported constant hoarseness as well as a nonproductive cough which has been present for years.  Examination that day revealed evidence of reflux and hyper functionality when she was placed on twice daily ranitidine.  She also has a diagnosis of Parkinson's disease but this is relatively new.  Please see that note for further details.  She tells me that her cough has improved since taking the ranitidine but her hoarseness remained stable.  She continues to take the ranitidine twice daily.  She denies any issues with dysphasia, hemoptysis, otalgia, neck mass, fever, chills, night sweats, or unintentional weight loss.       Past Medical History  Anemia, Unspecified; Arthritis; Asthma; Chest pain; Degenerative Disc Disease ; GERD (gastroesophageal reflux disease); Hemangioma of bone; Hepatitis A; Leg pain; Leg swelling; Leg weakness, bilateral; Low back pain; Lumbar stenosis; Muscle cramps; Parkinson's Disease; Seasonal allergies; Shortness of Breath; Spondylolisthesis of cervical region; Spondylolisthesis of lumbar region; Voice hoarseness         Past Surgical History  Carpal Tunnel Release; Cholecystecomy; Colonoscopy; Hand surgery; Joint Surgery; Tonsillectomy "         Medication List  5-hydroxytryptophan (5-HTP) 50 mg oral capsule; baclofen 5 mg oral tablet; Bystolic 2.5 mg oral tablet; carbidopa-levodopa  mg oral tablet; cetirizine 10 mg oral tablet; diclofenac sodium 25 mg oral tablet,delayed release (DR/EC); ferrous sulfate 325 mg (65 mg iron) oral tablet,delayed release (DR/EC); Flovent  mcg/actuation inhalation HFA aerosol inhaler; guaifenesin 600 mg oral tablet extended release 12hr; Imitrex 100 mg oral tablet; ipratropium bromide 0.03 % nasal spray,non-aerosol; iron 325 mg (65 mg iron) oral tablet; magnesium 200 mg oral tablet; meclizine 25 mg oral tablet; Mucinex 600 mg oral tablet extended release 12hr; Multiple Vitamins oral tablet; Omega DHA 92 mg (43 mg-22 [id number]) oral tablet,chewable; Pepcid 40 mg oral tablet; ProAir HFA 90 mcg/actuation inhalation HFA aerosol inhaler; Probiotic 15 billion cell oral capsule; tramadol 50 mg oral tablet; Vitamin D3 oral; vitamin E 400 unit oral capsule         Allergy List  Acetaminophen adverse reaction; Codiene; I.V. Dye; ibuprofen; IVP Dye; NSAIDS; tramadol         Family Medical History  Family history of breast cancer; Family history of Arthritis; Family history of stroke; Family history of heart disease; Family history of stomach cancer; Family history of malignant neoplasm of upper lobe bronchus or lung         Social History  Alcohol (Never); lives with spouse; ; Recreational Drug Use (Never); Retired; Tobacco (Never)         Review of Systems  · Constitutional  o Denies  o : fever, night sweats, weight loss  · Eyes  o Admits  o : impaired vision  o Denies  o : discharge from eye  · HENT  o Admits  o : *See HPI  · Cardiovascular  o Admits  o : irregular heart beats  o Denies  o : chest pain  · Respiratory  o Admits  o : wheezing  o Denies  o : shortness of breath, coughing up blood  · Gastrointestinal  o Denies  o : heartburn, reflux, vomiting blood  · Genitourinary  o Denies  o :  "frequency  · Integument  o Denies  o : rash, skin dryness  · Neurologic  o Admits  o : loss of balance, dizziness  o Denies  o : seizures, loss of consciousness  · Endocrine  o Denies  o : cold intolerance, heat intolerance  · Heme-Lymph  o Denies  o : easy bleeding, anemia      Vitals  Date Time BP Position Site L\R Cuff Size HR RR TEMP (F) WT  HT  BMI kg/m2 BSA m2 O2 Sat HC       08/28/2020 03:08 PM        97.5 174lbs 2oz 5'  3.5\" 30.36 1.88           Physical Examination  · Constitutional  o Appearance  o : well developed, well-nourished, alert and in no acute distress, voice mildly hoarse but strong  · Head and Face  o Head  o :   § Inspection  § : no deformities or lesions  o Face  o :   § Inspection  § : No facial lesions; House-Brackmann I/VI bilaterally  § Palpation  § : No TMJ crepitus nor  muscle tenderness bilaterally  · Eyes  o Vision  o :   § Visual Fields  § : Extraocular movements are intact. No spontaneous or gaze-induced nystagmus.  o Conjunctivae  o : clear  o Sclerae  o : clear  o Pupils and Irises  o : pupils equal, round, and reactive to light.   · Ears, Nose, Mouth and Throat  o Ears  o :   § External Ears  § : appearance within normal limits, no lesions present  § Otoscopic Examination  § : tympanic membrane appearance within normal limits bilaterally without perforations, well-aerated middle ears  § Hearing  § : intact to conversational voice both ears  o Nose  o :   § External Nose  § : appearance normal  § Intranasal Exam  § : mucosa within normal limits, vestibules normal, no intranasal lesions present, septum midline, sinuses non tender to percussion  o Oral Cavity  o :   § Oral Mucosa  § : oral mucosa normal without pallor or cyanosis  § Lips  § : lip appearance normal  § Teeth  § : normal dentition for age  § Gums  § : gums pink, non-swollen, no bleeding present  § Tongue  § : tongue appearance normal; normal mobility  § Palate  § : hard palate normal, soft palate appearance " normal with symmetric mobility  o Throat  o :   § Oropharynx  § : no inflammation or lesions present, tonsils surgically absent  § Hypopharynx  § : Deferred secondary to gag reflex  § Larynx  § : Deferred secondary to gag reflex  · Neck  o Inspection/Palpation  o : normal appearance, no masses or tenderness, trachea midline; thyroid size normal, nontender, no nodules or masses present on palpation  · Respiratory  o Respiratory Effort  o : breathing unlabored  · Lymphatic  o Neck  o : no lymphadenopathy present  o Supraclavicular Nodes  o : no lymphadenopathy present  o Preauricular Nodes  o : no lymphadenopathy present  · Skin and Subcutaneous Tissue  o General Inspection  o : Regarding face and neck - there are no rashes present, no lesions present, and no areas of discoloration  · Neurologic  o Cranial Nerves  o : cranial nerves II-XII are grossly intact bilaterally  o Gait and Station  o : normal gait, able to stand without diffculty  · Psychiatric  o Judgement and Insight  o : judgment and insight intact  o Mood and Affect  o : mood normal, affect appropriate          Assessment  · GERD (gastroesophageal reflux disease)     530.81/K21.9  · Voice hoarseness     784.42/R49.0  · Hyperfunctional dysphonia     300.11/F44.4    Problems Reconciled  Plan  · Medications  o Medications have been Reconciled  o Transition of Care or Provider Policy  · Instructions  o Impressions and findings are discussed with Mrs. Ibrahim at great length. Unfortunately, her voice did not improve after treatment with twice daily ranitidine. We discussed that aside from the evidence of reflux she did have findings concerning for hyperfunctional dysphonia. We discussed the pathophysiology and natural history of this condition. Options for management were discussed and I recommended speech therapy. However, she would like to continue with observation as she is not significantly bothered by the hoarseness. Therefore, she will call to arrange  follow-up if there are any change to her symptoms.            Electronically Signed by: Jameson Lira MD -Author on August 31, 2020 04:37:08 PM

## 2021-05-13 NOTE — PROGRESS NOTES
Progress Note      Patient Name: Kristi Ibrahim   Patient ID: [id number]   Sex: Female   YOB: 1950    Primary Care Provider: Roberto Carlos Reddy MD   Referring Provider: Roebrto Carlos Reddy MD    Visit Date: July 31, 2020    Provider: Rula Parker PA-C   Location: [facility]   Location Address: [address] [place] [address]  [id number]   Location Phone: [phone]          Chief Complaint     Patient is being seen today for low back pain.       History Of Present Illness     Here for f/u for her lbp.  She saw Dr. Huang last year for her spondylolisthesis at L4/5 with slight movement on bending xrays.  He recommended RFA instead of a lumbar fusion at that time.  He also mentioned a lumbar laminectomy for the leg symptoms.  She did not have lumbar RFA.  Pain worse since May due to 16 car ride and then had LESB and that helped some.  Balance has been worse over the past few months.  She has Parkinson's and has seen neurologist recently.  Went to PT and back pain has gotten worse with PT.  She fell backwards this morning.  Stairs make her pain worse and reaching and feels like she has to make her legs work to walk.  Pain down the right leg and similar on the left and pins and needles in the feet. Denies nicotine use. Has urinary frequency and urgency. Taking tramadol and baclofen.  NSAIDS have caused melena and she will discuss this with her PCP.       Past Medical History  Anemia, Unspecified; Arthritis; Asthma; Chest pain; Degenerative Disc Disease ; GERD (gastroesophageal reflux disease); Hemangioma of bone; Hepatitis A; Leg pain; Leg swelling; Leg weakness, bilateral; Lumbar stenosis; Muscle cramps; Parkinson's Disease; Seasonal allergies; Shortness of Breath; Spondylolisthesis of cervical region; Spondylolisthesis of lumbar region         Past Surgical History  Carpal Tunnel Release; Cholecystecomy; Colonoscopy; Hand surgery; Joint Surgery; Tonsillectomy         Medication  List  5-hydroxytryptophan (5-HTP) 50 mg oral capsule; baclofen 5 mg oral tablet; Bystolic 2.5 mg oral tablet; cetirizine 10 mg oral tablet; guaifenesin 600 mg oral tablet extended release 12hr; Imitrex 100 mg oral tablet; ipratropium bromide 0.03 % nasal spray,non-aerosol; iron 325 mg (65 mg iron) oral tablet; magnesium 200 mg oral tablet; meclizine 25 mg oral tablet; Multiple Vitamins oral tablet; Omega DHA 92 mg (43 mg-22 [id number]) oral tablet,chewable; ProAir HFA 90 mcg/actuation inhalation HFA aerosol inhaler; Probiotic 15 billion cell oral capsule; ranitidine HCl 150 mg oral tablet; tramadol 50 mg oral tablet; vitamin E 400 unit oral capsule         Allergy List  Acetaminophen adverse reaction; Codiene; I.V. Dye; ibuprofen; IVP Dye; NSAIDS; tramadol       Allergies Reconciled  Family Medical History  Family history of breast cancer; Family history of Arthritis; Family history of stroke; Family history of heart disease; Family history of stomach cancer; Family history of malignant neoplasm of upper lobe bronchus or lung         Social History  Alcohol (Never); lives with spouse; ; Recreational Drug Use (Never); Retired; Tobacco (Never)         Review of Systems  · Constitutional  o Admits  o : excessive sweating, fatigue, weight loss  o Denies  o : chills, fever, sycope/passing out, weight gain  · Eyes  o Denies  o : changes in vision, blurry vision, double vision  · HENT  o Admits  o : loss of hearing, nasal congestion, seasonal allergies  o Denies  o : ringing in the ears, ear aches, sore throat, sinus pain, nose bleeds  · Cardiovascular  o Admits  o : anemia, transfusions  o Denies  o : blood clots, swollen legs, easy burising or bleeding  · Respiratory  o Admits  o : shortness of breath, dry cough  o Denies  o : productive cough, pneumonia, COPD  · Gastrointestinal  o Admits  o : reflux  o Denies  o : difficulty swallowing  · Genitourinary  o Admits  o : incontinence  · Neurologic  o Admits  o :  "headache, tremor, loss of balance, falls, dizziness/vertigo, numbness/tingling/paresthesia , difficulty with coordination  o Denies  o : seizure, stroke, difficulty with dexterity, weakness  · Musculoskeletal  o Admits  o : neck stiffness/pain, muscle aches, joint pain, weakness, sciatica, pain radiating in leg, low back pain  o Denies  o : swollen lymph nodes, spasms, pain radiating in arm  · Endocrine  o Denies  o : diabetes, thyroid disorder  · Psychiatric  o Denies  o : anxiety, depression  · All Others Negative      Vitals  Date Time BP Position Site L\R Cuff Size HR RR TEMP (F) WT  HT  BMI kg/m2 BSA m2 O2 Sat HC       07/31/2020 08:42 AM 17/0 Sitting      97.8 174lbs 5oz 5'  3\" 30.88 1.87           Physical Examination  · Constitutional  o Appearance  o : well-nourished, well developed, alert, in no acute distress  · Respiratory  o Respiratory Effort  o : breathing unlabored  · Cardiovascular  o Peripheral Vascular System  o :   § Extremities  § : no cyanosis, clubbing or edema; less than 2 second refill noted  · Neurologic  o Mental Status Examination  o :   § Orientation  § : grossly oriented to person, place and time  o Motor Examination  o :   § RLE Strength  § : strength normal  § RLE Motor Function  § : tone normal, no atrophy, no abnormal movements noted  § LLE Strength  § : strength normal  § LLE Motor Function  § : tone normal, no atrophy, no abnormal movements noted  o Reflexes  o :   § RLE  § : 1/4  § LLE  § : 1/4  o Sensation  o :   § Light Touch  § : sensation intact to light touch in extremities  o Gait and Station  o :   § Gait Screening  § : slow and using a cane  · Psychiatric  o Mood and Affect  o : mood normal, affect appropriate          Assessment  · Spondylolisthesis of lumbar region     738.4/M43.16  · Leg weakness, bilateral     729.89/R29.898  · Lumbar stenosis     724.02/M48.061  · Low back pain     724.2/M54.5    Problems Reconciled  Plan  · Orders  o XR lumbar spine, 2-3 bending " views (28807) - 738.4/M43.16, 724.2/M54.5 - 07/31/2020  o MRI lumbar spine wo contrast (79092) - 738.4/M43.16, 729.89/R29.898, 724.02/M48.061, 724.2/M54.5 - 07/31/2020  · Medications  o Medications have been Reconciled  o Transition of Care or Provider Policy  · Instructions  o Continue PT and I will update her imaging and f/u with Dr. Huang to discuss surgical options now that her leg pain has progressed and has subjective leg weakness.             Electronically Signed by: Rula Parker PA-C -Author on July 31, 2020 09:20:32 AM

## 2021-05-14 VITALS — BODY MASS INDEX: 30.85 KG/M2 | HEIGHT: 63 IN | TEMPERATURE: 97.5 F | WEIGHT: 174.12 LBS

## 2021-05-14 VITALS — TEMPERATURE: 96.9 F | HEIGHT: 63 IN | BODY MASS INDEX: 30.71 KG/M2 | WEIGHT: 173.31 LBS

## 2021-05-15 VITALS
SYSTOLIC BLOOD PRESSURE: 144 MMHG | DIASTOLIC BLOOD PRESSURE: 83 MMHG | HEART RATE: 74 BPM | BODY MASS INDEX: 31.77 KG/M2 | HEIGHT: 63 IN | WEIGHT: 179.31 LBS

## 2021-05-15 VITALS
BODY MASS INDEX: 33.02 KG/M2 | DIASTOLIC BLOOD PRESSURE: 83 MMHG | HEART RATE: 84 BPM | RESPIRATION RATE: 16 BRPM | TEMPERATURE: 98 F | OXYGEN SATURATION: 98 % | WEIGHT: 186.37 LBS | SYSTOLIC BLOOD PRESSURE: 150 MMHG | HEIGHT: 63 IN

## 2021-05-15 VITALS
HEIGHT: 63 IN | DIASTOLIC BLOOD PRESSURE: 87 MMHG | WEIGHT: 181.25 LBS | BODY MASS INDEX: 32.11 KG/M2 | SYSTOLIC BLOOD PRESSURE: 143 MMHG

## 2021-05-15 VITALS — TEMPERATURE: 97.8 F | HEIGHT: 63 IN | WEIGHT: 174.31 LBS | BODY MASS INDEX: 30.89 KG/M2

## 2021-05-16 VITALS — OXYGEN SATURATION: 97 % | HEIGHT: 63 IN | WEIGHT: 171 LBS | BODY MASS INDEX: 30.3 KG/M2 | HEART RATE: 86 BPM

## 2021-05-16 VITALS — HEART RATE: 78 BPM | OXYGEN SATURATION: 97 % | BODY MASS INDEX: 30.3 KG/M2 | WEIGHT: 171 LBS | HEIGHT: 63 IN

## 2021-05-16 VITALS — WEIGHT: 171 LBS | HEIGHT: 63 IN | BODY MASS INDEX: 30.3 KG/M2

## 2021-05-19 ENCOUNTER — OFFICE VISIT CONVERTED (OUTPATIENT)
Dept: PULMONOLOGY | Facility: CLINIC | Age: 71
End: 2021-05-19
Attending: INTERNAL MEDICINE

## 2021-05-28 VITALS
TEMPERATURE: 98.8 F | HEIGHT: 63 IN | DIASTOLIC BLOOD PRESSURE: 72 MMHG | BODY MASS INDEX: 30.5 KG/M2 | SYSTOLIC BLOOD PRESSURE: 130 MMHG | HEART RATE: 79 BPM | RESPIRATION RATE: 10 BRPM | WEIGHT: 172.12 LBS | OXYGEN SATURATION: 98 %

## 2021-05-28 VITALS
HEIGHT: 63 IN | OXYGEN SATURATION: 98 % | WEIGHT: 181 LBS | BODY MASS INDEX: 32.07 KG/M2 | BODY MASS INDEX: 28.7 KG/M2 | DIASTOLIC BLOOD PRESSURE: 60 MMHG | RESPIRATION RATE: 16 BRPM | DIASTOLIC BLOOD PRESSURE: 96 MMHG | SYSTOLIC BLOOD PRESSURE: 110 MMHG | TEMPERATURE: 98 F | OXYGEN SATURATION: 99 % | SYSTOLIC BLOOD PRESSURE: 143 MMHG | RESPIRATION RATE: 16 BRPM | HEIGHT: 63 IN | HEART RATE: 78 BPM | TEMPERATURE: 97.3 F | WEIGHT: 162 LBS | HEART RATE: 75 BPM

## 2021-05-28 NOTE — PROGRESS NOTES
Patient: JANUSZ GRAY     Acct: [id number]     Report: #[id number]  UNIT #: [id number]     : 1950    Encounter Date:2021  PRIMARY CARE: CHEN MCKINNON  ***Signed***  --------------------------------------------------------------------------------------------------------------------  Chief Complaint      Encounter Date      May 19, 2021            Primary Care Provider      CHEN MCKINNON            Referring Provider      CHEN MCKINNON            Patient Complaint      Patient is complaining of      pt here for f/u Asthma            VITALS      Height 5 ft 3 in / 160.02 cm      Weight 181 lbs  / 82.433526 kg      BSA 1.85 m2      BMI 32.1 kg/m2      Temperature 98.0 F / 36.67 C - Temporal      Pulse 75      Respirations 16      Blood Pressure 143/96 Sitting, Right Arm      Pulse Oximetry 98%, room air      Initial Exhaled Nitrous Oxide      Date:  Aug 13, 2020            HPI      The patient is a pleasant 70 year old female here for follow up.  She is doing     well at this time.  She has been having some issues with bradycardia and     currently wearing a Holter monitor.  Shortness of breath is at her baseline and     does feel some more fatigue. She feels like this is most likely from her heart.     She uses her rescue inhaler less than one time a month.            ROS      Constitutional:  Denies: Fatigue, Fever, Weight gain, Weight loss, Chills,     Insomnia, Other      Respiratory/Breathing:  Complains of: Shortness of air, Wheezing, Cough; Denies:    Hemoptysis, Pleuritic pain, Other      Endocrine:  Denies: Polydipsia, Polyuria, Heat/cold intolerance, Abnorml     menstrual pattern, Diabetes, Other      Eyes:  Denies: Blurred vision, Vision Changes, Other      Ears, nose, mouth, throat:  Denies: Mouth lesions, Thrush, Throat pain,     Hoarseness, Allergies/Hay Fever, Post Nasal Drip, Headaches, Recent Head Injury,    Nose Bleeding, Neck Stiffness, Thyroid Mass, Hearing Loss, Ear Fullness, Dry  M    outh, Nasal or Sinus Pain, Dry Lips, Nasal discharge, Nasal congestion, Other      Cardiovascular:  Denies: Palpitations, Syncope, Claudication, Chest Pain, Wake     up Gasping for air, Leg Swelling, Irregular Heart Rate, Cyanosis, Dyspnea on E    xertion, Other      Gastrointestinal:  Denies: Nausea, Constipation, Diarrhea, Abdominal pain,     Vomiting, Difficulty Swallowing, Reflux/Heartburn, Dysphagia, Jaundice,     Bloating, Melena, Bloody stools, Other      Genitourinary:  Denies: Urinary frequency, Incontinence, Hematuria, Urgency,     Nocturia, Dysuria, Testicular problems, Other      Musculoskeletal:  Denies: Joint Pain, Joint Stiffness, Joint Swelling, Myalgias,    Other      Hematologic/lymphatic:  DENIES: Lymphadenopathy, Bruising, Bleeding tendencies,     Other      Neurological:  Denies: Headache, Numbness, Weakness, Seizures, Other      Psychiatric:  Denies: Anxiety, Appropriate Effect, Depression, Other      Sleep:  No: Excessive daytime sleep, Morning Headache?, Snoring, Insomnia?, Stop    breathing at sleep?, Other      Integumentary:  Denies: Rash, Dry skin, Skin Warm to Touch, Other      Immunologic/Allergic:  Denies: Latex allergy, Seasonal allergies, Asthma,     Urticaria, Eczema, Other      Immunization status:  No: Up to date            FAMILY/SOCIAL/MEDICAL HX      Surgical History:  Yes: Cholecystectomy (SCOPE), Head Surgery (T AND A),     Orthopedic Surgery (LEFT THUMB JOIN REPLACEMENT, L CARPAL,LMENISCUS, left rib     resection)      Stroke - Family Hx:  Mother      Diabetes - Family Hx:  Brother      Cancer/Type - Family Hx:  Father, Sister, Aunt, Uncle, Cousin      Is Father Still Living?:  No      Is Mother Still Living?:  No      Social History:  No Tobacco Use, No Alcohol Use, No Recreational Drug use      Smoking status:  Never smoker       Section:  No      Tubal Ligation:  Yes      Hysterectomy:  No      Anticoagulation Therapy:  No      Antibiotic Prophylaxis:  No       Medical History:  Yes: Arthritis, Asthma (BRONCHIAL AND ALLERGIC), Blood Disease    (HX ANEMIA, HEP A IN PAST), Diverticulitis, GERD, Reflux Disease, Shortness Of     Breath, Miscellaneous Medical/oth (Parkinson Disease); No: Chemotherapy/Cancer,     Deafness or Ringing Ears, Hemorrhoids/Rectal Prob, Sinus Trouble      Psychiatric History      none            PREVENTION      Hx Influenza Vaccination:  Yes      Date Influenza Vaccine Given:  Oct 1, 2020      Influenza Vaccine Declined:  No      2 or More Falls in Past Year?:  No      Fall Past Year with Injury?:  No      Hx Pneumococcal Vaccination:  Yes      Encouraged to follow-up with:  PCP regarding preventative exams.      Chart initiated by      Amy Reed [facility]            ALLERGIES/MEDICATIONS      Allergies:        Coded Allergies:             ACETAMINOPHEN (Verified  Allergy, Unknown, EYE SWELLING, 5/19/21)           CODEINE (Verified  Allergy, Unknown, NAUSEA, 5/19/21)           IBUPROFEN (Verified  Allergy, Unknown, KIDNEY FAILURE, 5/19/21)           IODINATED CONTRAST MEDIA (Verified  Allergy, Unknown, TOTAL BODY FLUSH,     5/19/21)           NSAIDS (NON-STEROIDAL ANTI-INFLAMMA (Verified  Allergy, Unknown, 5/19/21)                  SHUTS KIDNEYS DOWN      Medications    Last Reconciled on 5/19/21 15:24 by SAIDA AYALA MD      Albuterol (Proair HFA) 8.5 Gm Inh      2 PUFFS INH RTQ4H for soa for 90 Days, #3 INH 1 Refill         Prov: LYDIA VERDE         8/13/20       Fluticasone (Flovent HFA) 110 Mcg Inhaler      2 PUFFS INH RTBID for 90 Days, #3 INH 1 Refill         Prov: LYDIA VERDE         8/13/20       (carbodopa/levodopa) Unknown Strength  No Conflict Check               Reported         8/13/20       Nebivolol HCl (BYSTOLIC) 2.5 Mg Tablet      2.5 MG PO HS, #30 TAB         Reported         8/13/20       guaiFENesin LA (Mucinex) 600 Mg Tab.er.12h      600 MG PO BID, TAB         Reported         8/13/20       (lectin defense) Unknown  Strength  No Conflict Check               Reported         8/13/20       (hair skin nail vit) Unknown Strength  No Conflict Check      QDAY         Reported         8/13/20       Cetirizine Hcl (CETIRIZINE HCL) 10 Mg Tablet      10 MG PO QDAY, #30 TAB 0 Refills         Reported         8/13/20       (vitamin womens) Unknown Strength  No Conflict Check      QDAY         Reported         8/13/20       (methyl-SP) Unknown Strength  No Conflict Check      TID         Reported         8/13/20       (CBD oil) 300 MG No Conflict Check      QDAY         Reported         8/13/20       (vitamin d drops)   No Conflict Check               Reported         8/13/20       Lactobac Cmb #3/Fos/Pantethine (Probiotic   1 EACH PO BID, CAP         Reported         8/13/20       (food enzymes) Unknown Strength  No Conflict Check               Reported         8/13/20       (omega q 10 plus)   No Conflict Check      QDAY         Reported         8/13/20       Vitamin E (Dl,Tocopheryl Acet) (Vitamin E) 200 Unit Capsule      400 UNITS PO QDAY, CAP         Reported         8/13/20       traMADol (Ultram) 50 Mg Tablet      50 MG PO QID PRN for PAIN, TAB 0 Refills         Reported         8/13/20       Famotidine (Famotidine) 40 Mg Tablet      40 MG PO HS for 30 Days, #30 TAB         Reported         8/13/20       Ferrous Sulfate (Ferrous Sulfate*) 325 Mg Tablet      325 MG PO BID, #60 TAB 0 Refills         Reported         8/13/20       Baclofen (BACLOFEN) 10 Mg Tablet      5 MG PO TID, #90 TAB 0 Refills         Reported         8/13/20      Current Medications      Current Medications Reviewed 5/19/21            EXAM      Vital Signs Reviewed.      General:  WDWN, Alert, NAD.      HEENT: PERRL, EOMI.  OP, nares clear, no sinus tenderness.      Neck: Supple, no JVD, no thyromegaly.      Lymph: No axillary, cervical, supraclavicular lymphadenopathy noted bilaterally.      Chest: Good aeration, clear to auscultation bilaterally, tympanic to  percussion     bilaterally, no work of breathing noted.      CV: RRR, no MGR, pulses 2+, equal.        Abd: Soft, NT, ND, +BS, no HSM.      EXT: No clubbing, no cyanosis, no edema, no joint tenderness.        Neuro:  A  Skin: No rashes or lesions.      Vtials      Vitals:             Height 5 ft 3 in / 160.02 cm           Weight 181 lbs  / 82.446395 kg           BSA 1.85 m2           BMI 32.1 kg/m2           Temperature 98.0 F / 36.67 C - Temporal           Pulse 75           Respirations 16           Blood Pressure 143/96 Sitting, Right Arm           Pulse Oximetry 98%, room air            REVIEW      Results Reviewed      PCCS Results Reviewed?:  Yes Prev Lab Results, Yes Prev Radiology Results, Yes     Previous [facility] Records            Assessment      ASSESSMENT:       1. Mild intermittent asthma.-controlled       2. Dyspnea,      3 bradycardia            PLAN:      1. Continue prn albuterol.      2. Continue prn flovent.      3. Follow up in six months.      4. heart monitor per cards      5 up to date on COVID vaccine      I have personally reviewed laboratory data, imaging as well as previous medical     records.            Patient Education      Education resources provided:  Yes      Patient Education Provided:  Acute Asthma            Electronically signed by Barry Easley  05/24/2021 16:57       Disclaimer: Converted document may not contain table formatting or lab diagrams. Please see [employer] for the authenticated document.

## 2021-05-28 NOTE — PROGRESS NOTES
Patient: JANUSZ GRAY     Acct: [id number]     Report: #[id number]  UNIT #: [id number]     : 1950    Encounter Date:11/10/2020  PRIMARY CARE: CHEN MCKINNON  ***Signed***  --------------------------------------------------------------------------------------------------------------------  Chief Complaint      Encounter Date      Nov 10, 2020            Primary Care Provider      CHEN MCKINNON            Referring Provider      CHEN MCKINNON            Patient Complaint      Patient is complaining of      Pt here for f/u, Asthma            VITALS      Height 5 ft 3 in / 160.02 cm      Weight 162 lbs 0 oz / 73.650900 kg      BSA 1.77 m2      BMI 28.7 kg/m2      Temperature 97.3 F / 36.28 C - Temporal      Pulse 78      Respirations 16      Blood Pressure 110/60 Sitting, Right Arm      Pulse Oximetry 99%, Room air      Initial Exhaled Nitrous Oxide      Date:  Aug 13, 2020            HPI      The patient is a pleasant 70 year old female who is here today for follow up,     doing well, asthma is well-controlled, minimal symptoms, uses her inhalers very     infrequently, overall feels dyspnea well-controlled.            ROS      Constitutional:  Denies: Fatigue, Fever, Weight gain, Weight loss, Chills,     Insomnia, Other      Respiratory/Breathing:  Complains of: Cough; Denies: Shortness of air, Wheezing,    Hemoptysis, Pleuritic pain, Other      Endocrine:  Denies: Polydipsia, Polyuria, Heat/cold intolerance, Abnorml me    nstrual pattern, Diabetes, Other      Eyes:  Denies: Blurred vision, Vision Changes, Other      Ears, nose, mouth, throat:  Denies: Mouth lesions, Thrush, Throat pain,     Hoarseness, Allergies/Hay Fever, Post Nasal Drip, Headaches, Recent Head Injury,    Nose Bleeding, Neck Stiffness, Thyroid Mass, Hearing Loss, Ear Fullness, Dry     Mouth, Nasal or Sinus Pain, Dry Lips, Nasal discharge, Nasal congestion, Other      Cardiovascular:  Denies: Palpitations, Syncope, Claudication, Chest  Pain, Wake     up Gasping for air, Leg Swelling, Irregular Heart Rate, Cyanosis, Dyspnea on     Exertion, Other      Gastrointestinal:  Denies: Nausea, Constipation, Diarrhea, Abdominal pain,     Vomiting, Difficulty Swallowing, Reflux/Heartburn, Dysphagia, Jaundice,     Bloating, Melena, Bloody stools, Other      Genitourinary:  Denies: Urinary frequency, Incontinence, Hematuria, Urgency,     Nocturia, Dysuria, Testicular problems, Other      Musculoskeletal:  Denies: Joint Pain, Joint Stiffness, Joint Swelling, Myalgias,    Other      Hematologic/lymphatic:  DENIES: Lymphadenopathy, Bruising, Bleeding tendencies,     Other      Neurological:  Denies: Headache, Numbness, Weakness, Seizures, Other      Psychiatric:  Denies: Anxiety, Appropriate Effect, Depression, Other      Sleep:  No: Excessive daytime sleep, Morning Headache?, Snoring, Insomnia?, Stop    breathing at sleep?, Other      Integumentary:  Denies: Rash, Dry skin, Skin Warm to Touch, Other      Immunologic/Allergic:  Denies: Latex allergy, Seasonal allergies, Asthma,     Urticaria, Eczema, Other      Immunization status:  No: Up to date            FAMILY/SOCIAL/MEDICAL HX      Surgical History:  Yes: Cholecystectomy (SCOPE), Head Surgery (T AND A),     Orthopedic Surgery (LEFT THUMB JOIN REPLACEMENT, L CARPAL,LMENISCUS, left rib     resection)      Stroke - Family Hx:  Mother      Diabetes - Family Hx:  Brother      Cancer/Type - Family Hx:  Father, Sister, Aunt, Uncle, Cousin      Is Father Still Living?:  No      Is Mother Still Living?:  No      Social History:  No Tobacco Use, No Alcohol Use, No Recreational Drug use      Smoking status:  Never smoker       Section:  No      Tubal Ligation:  Yes      Hysterectomy:  No      Anticoagulation Therapy:  No      Antibiotic Prophylaxis:  No      Medical History:  Yes: Arthritis, Asthma (BRONCHIAL AND ALLERGIC), Blood Disease    (HX ANEMIA, HEP A IN PAST), Diverticulitis, GERD, Reflux Disease,  Shortness Of     Breath, Miscellaneous Medical/oth (Parkinson Disease); No: Chemotherapy/Cancer,     Deafness or Ringing Ears, Hemorrhoids/Rectal Prob, Sinus Trouble      Psychiatric History      None            PREVENTION      Hx Influenza Vaccination:  Yes      Date Influenza Vaccine Given:  Oct 1, 2020      Influenza Vaccine Declined:  No      2 or More Falls in Past Year?:  No      Fall Past Year with Injury?:  No      Hx Pneumococcal Vaccination:  Yes      Encouraged to follow-up with:  PCP regarding preventative exams.      Chart initiated by      Maryellen Brink MA            ALLERGIES/MEDICATIONS      Allergies:        Coded Allergies:             ACETAMINOPHEN (Verified  Allergy, Unknown, EYE SWELLING, 11/10/20)           CODEINE (Verified  Allergy, Unknown, NAUSEA, 11/10/20)           IBUPROFEN (Verified  Allergy, Unknown, KIDNEY FAILURE, 11/10/20)           IODINATED CONTRAST MEDIA (Verified  Allergy, Unknown, TOTAL BODY FLUSH,     11/10/20)           NSAIDS (NON-STEROIDAL ANTI-INFLAMMA (Verified  Allergy, Unknown, 11/10/20)                  SHUTS KIDNEYS DOWN      Medications    Last Reconciled on 11/10/20 14:12 by SAIDA AYALA MD      Albuterol (Proair HFA) 8.5 Gm Inh      2 PUFFS INH RTQ4H for soa for 90 Days, #3 INH 1 Refill         Prov: MEHREENLYDIA         8/13/20       Fluticasone (Flovent HFA) 110 Mcg Inhaler      2 PUFFS INH RTBID for 90 Days, #3 INH 1 Refill         Prov: MEHREENLYDIA         8/13/20       (carbodopa/levodopa) Unknown Strength  No Conflict Check               Reported         8/13/20       Nebivolol HCl (BYSTOLIC) 2.5 Mg Tablet      2.5 MG PO HS, #30 TAB         Reported         8/13/20       guaiFENesin LA (Mucinex) 600 Mg Tab.er.12h      600 MG PO BID, TAB         Reported         8/13/20       (lectin defense) Unknown Strength  No Conflict Check               Reported         8/13/20       (hair skin nail vit) Unknown Strength  No Conflict Check      QDAY         Reported          8/13/20       Cetirizine Hcl (CETIRIZINE HCL) 10 Mg Tablet      10 MG PO QDAY, #30 TAB 0 Refills         Reported         8/13/20       (vitamin womens) Unknown Strength  No Conflict Check      QDAY         Reported         8/13/20       (methyl-SP) Unknown Strength  No Conflict Check      TID         Reported         8/13/20       (CBD oil) 300 MG No Conflict Check      QDAY         Reported         8/13/20       (vitamin d drops)   No Conflict Check               Reported         8/13/20       Lactobac Cmb #3/Fos/Pantethine (Probiotic   1 EACH PO BID, CAP         Reported         8/13/20       (food enzymes) Unknown Strength  No Conflict Check               Reported         8/13/20       (omega q 10 plus)   No Conflict Check      QDAY         Reported         8/13/20       Vitamin E (Dl,Tocopheryl Acet) (Vitamin E) 200 Unit Capsule      400 UNITS PO QDAY, CAP         Reported         8/13/20       traMADol (Ultram) 50 Mg Tablet      50 MG PO QID PRN for PAIN, TAB 0 Refills         Reported         8/13/20       Famotidine (Famotidine) 40 Mg Tablet      40 MG PO HS for 30 Days, #30 TAB         Reported         8/13/20       Ferrous Sulfate (Ferrous Sulfate*) 325 Mg Tablet      325 MG PO BID, #60 TAB 0 Refills         Reported         8/13/20       Baclofen (BACLOFEN) 10 Mg Tablet      5 MG PO TID, #90 TAB 0 Refills         Reported         8/13/20      Current Medications      Current Medications Reviewed 11/10/20            EXAM      VITAL SIGNS:  Reviewed.        NECK:  Supple without tracheal deviation or lymphadenopathy.  No thyromegaly     appreciated.      LYMPHATICS:  No cervical or supraclavicular lymphadenopathy.      HEENT: Pupils are equal, round and reactive to light. There is no scleral     icterus.  Nares patent without hypertrophy of the turbinates. No erythema of the    passages.  She has dry external auditory canals and auricle on the left.  She     has dull TMs bilaterally without bulging or  erythema.        RESPIRATORY:  Clear to auscultation bilaterally.  No wheezes, rales or rhonchi.     Tympanic to percussion.        CARDIOVASCULAR:  Regular rate and rhythm.  No murmurs, gallops or rubs.  No     lower extremity edema.  Equal radial pulses.        GI: Soft, nontender, nondistended, no organomegaly.  Bowel sounds present in all    four quadrants.      MUSCULOSKELETAL:  She has an antalgic gait. She is avoiding putting weight on     the right leg and ambulates with a cane.        SKIN:  No rashes or lesions.      NEUROLOGIC: Cranial nerves II-XII are intact bilaterally.  Moves all     extremities. Ambulates with ease.      PSYCH:  Appropriate mood and affect.      Vtials      Vitals:             Height 5 ft 3 in / 160.02 cm           Weight 162 lbs 0 oz / 73.910887 kg           BSA 1.77 m2           BMI 28.7 kg/m2           Temperature 97.3 F / 36.28 C - Temporal           Pulse 78           Respirations 16           Blood Pressure 110/60 Sitting, Right Arm           Pulse Oximetry 99%, Room air            REVIEW      Results Reviewed      [facility] Results Reviewed?:  Yes Prev Lab Results, Yes Prev Radiology Results, Yes     Previous [facility] Records            Assessment      ASSESSMENT:       1. Mild intermittent asthma.      2. Dyspnea, improved.            PLAN:      1. Continue prn albuterol.      2. Continue prn flovent.      3. Follow up in six months.      4. I have personally reviewed laboratory data, imaging as well as previous     medical records.            Patient Education      Education resources provided:  Yes      Patient Education Provided:  Acute Bronchitis            Electronically signed by Barry Easley  11/11/2020 16:03       Disclaimer: Converted document may not contain table formatting or lab diagrams. Please see [employer] for the authenticated document.

## 2021-05-28 NOTE — PROGRESS NOTES
Patient: JANUSZ GRAY     Acct: [id number]     Report: #[id number]  UNIT #: [id number]     : 1950    Encounter Date:2020  PRIMARY CARE: ROBERTO CARLOS MCKINNON  ***Signed***  --------------------------------------------------------------------------------------------------------------------  Chief Complaint      Encounter Date      Aug 13, 2020            Primary Care Provider      ROBERTO CARLOS MCKINNON            Referring Provider      ROBERTO CARLOS MCKINNON            Patient Complaint      Patient is complaining of      New pt here for Chronic Cough            VITALS      Height 5 ft 3 in / 160.02 cm      Weight 172 lbs 2 oz / 78.930472 kg      BSA 1.81 m2      BMI 30.5 kg/m2      Temperature 98.8 F / 37.11 C - Tympanic      Pulse 79      Respirations 10      Blood Pressure 130/72 Sitting, Left Arm      Pulse Oximetry 98%, room air      Initial Exhaled Nitrous Oxide      Date:  Aug 13, 2020      Exhaled Nitrous Oxide Results:  30            HPI      The patient is a 70 year old lifetime nonsmoker who was sent by Roberto Carlos Mckinnon     for evaluation of chronic cough and asthma. The patient use to see Dr. ISAAK Juares many years ago, but did not want to be put on biologic agents for her     asthma, so she quit following with that pulmonologist.  She is maintained on     flovent, but she uses it as needed and she is also on ProAir.  She does not even    use it on a weekly basis. She has no nighttime awakenings.  She does not know     what triggers her asthma. She does suffer from allergies, hayfever and     hoarseness.  She has heartburn as well.  She does not like to take prescription     medications and likes to take lots of vitamins and natural supplements.  She     says she is very sensitive to medications and has a lot of reactions when she     does take them.  She was first diagnosed with asthma in her teenage years. She     has only been hospitalized once and that was in  when she fell ill with     Legionaries  disease.  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  Her      smoked heavily for many years and quit.  She has no family history of     lung cancer or asthma to her knowledge.  She does not wear supplemental oxygen     or suffered from sleep apnea.  She denies any fever, chills, night sweats or     weight loss. She has intermittent dry cough, shortness of breath and wheeze.      They tend to be worse when she is outside in the environmental pollen and other     irritants.            A full 14 point review of systems was explored with this patient and she had     multiple positive review of systems.  I have explored them with her, they are     all chronic and nothing new.            PAST MEDICAL HISTORY:      1. Moderate to severe asthma.      2. Seasonal allergies.      3. Osteoarthritis.      4. History of anemia requiring iron transfusions.      5.  History of hepatitis A.      6. Diverticulitis.      7.  Gastroesophageal reflux disease.      8.  Parkinson's disease.      9. Sciatica.      10. Coronary artery disease, status post PCI.              PAST SURGICAL HISTORY:      1.  Laparoscopic cholecystectomy.      2.  Tonsillectomy/adenoidectomy.      3. Left thumb joint replacement.      4. Left carpal tunnel surgery.      5. Left meniscal tear repair.        6. Left rib resection.            FAMILY HISTORY: Father with a history of stomach cancer, two sisters with breast    cancer, a brother with diabetes, mom with a history of CVA. She has multiple     aunts and uncles with cancers, colon and breast included.              SOCIAL HISTORY: She is a lifetime nonsmoker.  Denies alcohol use or illicit drug    use. She is .      Copies To:   CHEN MCKINNON      Constitutional:  Denies: Fatigue, Fever, Weight gain,  Weight loss, Chills,     Insomnia, Other      Respiratory/Breathing:  Complains of: Shortness of air, Wheezing, Cough; Denies:    Hemoptysis, Pleuritic pain, Other      Endocrine:  Denies: Polydipsia, Polyuria, Heat/cold intolerance, Abnorml     menstrual pattern, Diabetes, Other      Eyes:  Denies: Blurred vision, Vision Changes, Other      Ears, nose, mouth, throat:  Complains of: Throat pain, Hoarseness, Allergies/Hay    Fever, Headaches, Dry Mouth, Nasal discharge; Denies: Mouth lesions, Thrush,     Post Nasal Drip, Recent Head Injury, Nose Bleeding, Neck Stiffness, Thyroid     Mass, Hearing Loss, Ear Fullness, Nasal or Sinus Pain, Dry Lips, Nasal     congestion, Other      Cardiovascular:  Complains of: Irregular Heart Rate, Dyspnea on Exertion, Other     (stent); Denies: Palpitations, Syncope, Claudication, Chest Pain, Wake up     Gasping for air, Leg Swelling, Cyanosis      Gastrointestinal:  Complains of: Constipation, Abdominal pain, Reflux/Heartburn;    Denies: Nausea, Diarrhea, Vomiting, Difficulty Swallowing, Dysphagia, Jaundice,     Bloating, Melena, Bloody stools, Other      Genitourinary:  Denies: Urinary frequency, Incontinence, Hematuria, Urgency,     Nocturia, Dysuria, Testicular problems, Other      Musculoskeletal:  Complains of: Joint Pain (Back, hands, neck, shoulders), Joint    Stiffness, Joint Swelling; Denies: Myalgias, Other      Hematologic/lymphatic:  DENIES: Lymphadenopathy, Bruising, Bleeding tendencies,     Other      Neurological:  Complains of: Numbness (feet, back issue); Denies: Headache,     Weakness, Seizures, Other      Psychiatric:  Denies: Anxiety, Appropriate Effect, Depression, Other      Sleep:  No: Excessive daytime sleep, Morning Headache?, Snoring, Insomnia?, Stop    breathing at sleep?, Other      Integumentary:  Denies: Rash, Dry skin, Skin Warm to Touch, Other      Immunologic/Allergic:  Complains of: Seasonal allergies, Asthma; Denies: Latex     allergy, Urticaria,  Eczema, Other      Immunization status:  Up to date            FAMILY/SOCIAL/MEDICAL HX      Surgical History:  Yes: Cholecystectomy (SCOPE), Head Surgery (T AND A),     Orthopedic Surgery (LEFT THUMB JOIN REPLACEMENT, L CARPAL,LMENISCUS, left rib     resection)      Stroke - Family Hx:  Mother      Diabetes - Family Hx:  Brother      Cancer/Type - Family Hx:  Father (stomach cancer), Sister (2 sisters, breast     cancer), Aunt (breast cancer), Uncle (colon cancer), Cousin (unknown)      Is Father Still Living?:  No      Is Mother Still Living?:  No      Social History:  Tobacco Use; No Alcohol Use, No Recreational Drug use      Smoking status:  Never smoker       Section:  No      Tubal Ligation:  Yes      Hysterectomy:  No      Medical History:  Yes: Allergies, Arthritis, Asthma (BRONCHIAL AND ALLERGIC),     Blood Disease (HX ANEMIA, HEP A IN PAST), Diverticulitis, GERD, Reflux Disease,     Shortness Of Breath, Miscellaneous Medical/oth (Parkinson Disease); No:     Chemotherapy/Cancer, Deafness or Ringing Ears, Hemorrhoids/Rectal Prob      Psychiatric History      None            PREVENTION      Hx Influenza Vaccination:  Yes      Date Influenza Vaccine Given:  Oct 16, 2019      Influenza Vaccine Declined:  No      2 or More Falls in Past Year?:  No      Fall Past Year with Injury?:  No      Hx Pneumococcal Vaccination:  Yes      Encouraged to follow-up with:  PCP regarding preventative exams.      Chart initiated by      Aziza Beck MA            ALLERGIES/MEDICATIONS      Allergies:        Coded Allergies:             ACETAMINOPHEN (Verified  Allergy, Unknown, EYE SWELLING, 20)           CODEINE (Verified  Allergy, Unknown, NAUSEA, 20)           IBUPROFEN (Verified  Allergy, Unknown, KIDNEY FAILURE, 20)           IODINATED CONTRAST MEDIA (Verified  Allergy, Unknown, TOTAL BODY FLUSH,     20)           NSAIDS (NON-STEROIDAL ANTI-INFLAMMA (Verified  Allergy, Unknown, 20)                   SHUTS KIDNEYS DOWN      Medications    Last Reconciled on 8/13/20 14:10 by LYDIA VERDE,       Albuterol (Proair HFA) 8.5 Gm Inh      2 PUFFS INH RTQ4H for soa for 90 Days, #3 INH 1 Refill         Prov: LYDIA VERDE         8/13/20       Fluticasone (Flovent HFA) 110 Mcg Inhaler      2 PUFFS INH RTBID for 90 Days, #3 INH 1 Refill         Prov: LYDIA VERDE         8/13/20       (carbodopa/levodopa) Unknown Strength  No Conflict Check               Reported         8/13/20       Nebivolol HCl (BYSTOLIC) 2.5 Mg Tablet      2.5 MG PO HS, #30 TAB         Reported         8/13/20       guaiFENesin LA (Mucinex) 600 Mg Tab.er.12h      600 MG PO BID, TAB         Reported         8/13/20       (lectin defense) Unknown Strength  No Conflict Check               Reported         8/13/20       (hair skin nail vit) Unknown Strength  No Conflict Check      QDAY         Reported         8/13/20       Cetirizine Hcl (CETIRIZINE HCL) 10 Mg Tablet      10 MG PO QDAY, #30 TAB 0 Refills         Reported         8/13/20       (vitamin womens) Unknown Strength  No Conflict Check      QDAY         Reported         8/13/20       (methyl-SP) Unknown Strength  No Conflict Check      TID         Reported         8/13/20       (CBD oil) 300 MG No Conflict Check      QDAY         Reported         8/13/20       (vitamin d drops)   No Conflict Check               Reported         8/13/20       Lactobac Cmb #3/Fos/Pantethine (Probiotic   1 EACH PO BID, CAP         Reported         8/13/20       (food enzymes) Unknown Strength  No Conflict Check               Reported         8/13/20       (omega q 10 plus)   No Conflict Check      QDAY         Reported         8/13/20       Vitamin E (Dl,Tocopheryl Acet) (Vitamin E) 200 Unit Capsule      400 UNITS PO QDAY, CAP         Reported         8/13/20       traMADol (Ultram) 50 Mg Tablet      50 MG PO QID PRN for PAIN, TAB 0 Refills         Reported         8/13/20       Famotidine (Famotidine) 40  Mg Tablet      40 MG PO HS for 30 Days, #30 TAB         Reported         8/13/20       Ferrous Sulfate (Ferrous Sulfate*) 325 Mg Tablet      325 MG PO BID, #60 TAB 0 Refills         Reported         8/13/20       Baclofen (BACLOFEN) 10 Mg Tablet      5 MG PO TID, #90 TAB 0 Refills         Reported         8/13/20      Current Medications      Current Medications Reviewed 8/13/20            EXAM      VITAL SIGNS:  Reviewed.        NECK:  Supple without tracheal deviation or lymphadenopathy.  No thyromegaly     appreciated.      LYMPHATICS:  No cervical or supraclavicular lymphadenopathy.      HEENT: Pupils are equal, round and reactive to light. There is no scleral     icterus.  Nares patent without hypertrophy of the turbinates. No erythema of the    passages.  She has dry external auditory canals and auricle on the left.  She     has dull TMs bilaterally without bulging or erythema.        RESPIRATORY:  Clear to auscultation bilaterally.  No wheezes, rales or rhonchi.     Tympanic to percussion.        CARDIOVASCULAR:  Regular rate and rhythm.  No murmurs, gallops or rubs.  No low    er extremity edema.  Equal radial pulses.        GI: Soft, nontender, nondistended, no organomegaly.  Bowel sounds present in all    four quadrants.      MUSCULOSKELETAL:  She has an antalgic gait. She is avoiding putting weight on     the right leg and ambulates with a cane.        SKIN:  No rashes or lesions.      NEUROLOGIC: Cranial nerves II-XII are intact bilaterally.  Moves all     extremities. Ambulates with ease.      PSYCH:  Appropriate mood and affect.      Vtials      Vitals:             Height 5 ft 3 in / 160.02 cm           Weight 172 lbs 2 oz / 78.261817 kg           BSA 1.81 m2           BMI 30.5 kg/m2           Temperature 98.8 F / 37.11 C - Tympanic           Pulse 79           Respirations 10           Blood Pressure 130/72 Sitting, Left Arm           Pulse Oximetry 98%, room air            REVIEW      Results  Reviewed      PCCS Results Reviewed?:  Yes Prev Lab Results, Yes Prev Radiology Results, Yes     Previous [facility] Records      Lab Results      Unfortunately, there are no records today for my review.            Assessment      Notes      New Medications      * BACLOFEN 10 MG TABLET: 5 MG PO TID #90      * Ferrous Sulfate (Ferrous Sulfate*) 325 MG TABLET: 325 MG PO BID #60      * Famotidine 40 MG TABLET: 40 MG PO HS 30 Days #30      * traMADol (Ultram) 50 MG TABLET: 50 MG PO QID PRN PAIN      * VITAMIN E (DL,TOCOPHERYL ACET) (Vitamin E) 200 UNIT CAPSULE: 400 UNITS PO QDAY      * (omega q 10 plus): QDAY      * (food enzymes) Unknown Strength:          Instructions: Before each meal      * LACTOBAC CMB #3/FOS/PANTETHINE (Probiotic     EACH PO BID      * (vitamin d drops):          Instructions: 5 drops daily A.M.      * (CBD oil) 300 MG: QDAY      * (methyl-SP) Unknown Strength: TID      * (vitamin womens) Unknown Strength: QDAY      * CETIRIZINE HCL 10 MG TABLET: 10 MG PO QDAY #30      * (hair skin nail vit) Unknown Strength: QDAY      * (lectin defense) Unknown Strength:          Instructions: 1 before meals      * guaiFENesin LA (Mucinex) 600 MG TAB.ER.12H: 600 MG PO BID      * Nebivolol HCl (BYSTOLIC) 2.5 MG TABLET: 2.5 MG PO HS #30      * (carbodopa/levodopa) Unknown Strength:       * Fluticasone (Flovent HFA) 110 MCG INHALER: 2 PUFFS INH RTBID 90 Days #3      * ALBUTEROL (Proair HFA) 8.5 GM INH: 2 PUFFS INH RTQ4H soa 90 Days #3      New Diagnostics      * PFT-Comp, PrePost,DLCO,BodyBox, Week         Dx: Chronic cough - R05      * [facility] Pre-Op Covid Screening, Routine         Dx: ENCOUNTER FOR OTHER PREPROCEDURAL EXAMINATION - Z01.818      New Office Procedures      * 6 Min Walk With Pulse Ox, SCHEDULED PROCEDURE         Dx: Chronic cough - R05      ASSESSMENT:       1. History of moderate severe asthma, allergic asthma.      2. Obesity, BMI 30.5.      3. 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      4. Parkinson's disease.      5. Seasonal and environmental allergies.      6. Dyspnea with exertion.      7. Wheezing.      8. Sciatica.            PLAN:      1. I have ordered baseline pulmonary function testing and six minute walk test.     I have also asked for Dr. Juares's records and previous pulmonary function     tests for comparison.      2. She does not warrant any imaging today.      3. I have refilled ProAir and Flovent 90 day supply through [facility].      4. I did  her on the appropriate to use inhalers.  The newest guidelines     would say that she can use the inhaled cortical steroids on an as needed basis,     so she will continue to do so.      5.  She is up-to-date on her pneumococcal vaccine and flu vaccine. She will need    her flu vaccine in the fall.        6.  I have also requested any recent lab work from her PCP, Dr. Reddy to rule     out peripheral eosinophilia.        7. Follow up in four months.            Patient Education      Education resources provided:  Yes      Patient Education Provided:  Acute Asthma, How to use an Inhaler            Electronically signed by LYDIA VERDE  08/23/2020 21:23       Disclaimer: Converted document may not contain table formatting or lab diagrams. Please see [facility] for the authenticated document.

## 2021-06-16 ENCOUNTER — TELEMEDICINE (OUTPATIENT)
Dept: CARDIOLOGY | Age: 71
End: 2021-06-16

## 2021-06-16 VITALS
HEART RATE: 77 BPM | BODY MASS INDEX: 31.36 KG/M2 | DIASTOLIC BLOOD PRESSURE: 74 MMHG | HEIGHT: 63 IN | WEIGHT: 177 LBS | SYSTOLIC BLOOD PRESSURE: 108 MMHG

## 2021-06-16 DIAGNOSIS — I49.5 SSS (SICK SINUS SYNDROME) (HCC): ICD-10-CM

## 2021-06-16 DIAGNOSIS — I10 BENIGN ESSENTIAL HYPERTENSION: ICD-10-CM

## 2021-06-16 DIAGNOSIS — R00.2 PALPITATIONS: ICD-10-CM

## 2021-06-16 DIAGNOSIS — R07.2 PRECORDIAL PAIN: Primary | ICD-10-CM

## 2021-06-16 PROCEDURE — 99213 OFFICE O/P EST LOW 20 MIN: CPT | Performed by: INTERNAL MEDICINE

## 2021-06-16 NOTE — PROGRESS NOTES
" Subjective:        Kristi Ibrahim is a 71 y.o. female who here for follow up    CC  Video  palpitation  HPI  77-year-old female complaining of rare intermittent palpitations mild intensity     Problems Addressed this Visit        Cardiac and Vasculature    Benign essential hypertension    Chest pain - Primary    SSS (sick sinus syndrome) (CMS/HCC)    Palpitations      Diagnoses       Codes Comments    Precordial pain    -  Primary ICD-10-CM: R07.2  ICD-9-CM: 786.51     Benign essential hypertension     ICD-10-CM: I10  ICD-9-CM: 401.1     SSS (sick sinus syndrome) (CMS/HCC)     ICD-10-CM: I49.5  ICD-9-CM: 427.81     Palpitations     ICD-10-CM: R00.2  ICD-9-CM: 785.1         .    The following portions of the patient's history were reviewed and updated as appropriate: allergies, current medications, past family history, past medical history, past social history, past surgical history and problem list.    Past Medical History:   Diagnosis Date   • Arthritis    • Asthma     \"ALLERGIC\"   • Coronary artery disease    • Fibromyalgia    • Hyperlipidemia    • Hypertension    • Lumbar herniated disc    • Migraines    • Mitral valve insufficiency    • Parkinson's disease (CMS/HCC)    • Tricuspid regurgitation      reports that she has never smoked. She has never used smokeless tobacco. She reports that she does not drink alcohol and does not use drugs.   Family History   Problem Relation Age of Onset   • Heart disease Mother    • Stroke Mother    • Heart disease Father    • Hypertension Father    • Heart disease Brother    • Hypertension Brother        Review of Systems  Constitutional: No wt loss, fever, fatigue  Gastrointestinal: No nausea, abdominal pain  Behavioral/Psych: No insomnia or anxiety   Cardiovascular palpitation  Objective:       Physical Exam  video           Physical Exam  /74   Pulse 77   Ht 160 cm (63\")   Wt 80.3 kg (177 lb)   BMI 31.35 kg/m²     General appearance: No acute changes   Eyes: Sclera " conjunctiva normal, pupils reactive   HENT: Atraumatic; oropharynx clear with moist mucous membranes and no mucosal ulcerations;  Neck: Trachea midline; NECK, supple, no thyromegaly or lymphadenopathy   Lungs: Normal size and shape, normal breath sounds, equal distribution of air, no rales and rhonchi   CV: S1-S2 regular, no murmurs, no rub, no gallop   Abdomen: Soft, non-tender; no masses , no abnormal abdominal sounds   Extremities: No deformity , normal color , no peripheral edema   Skin: Normal temperature, turgor and texture; no rash, ulcers  Psych: Appropriate affect, alert and oriented to person, place and time       Procedures      Echocardiogram:        Current Outpatient Medications:   •  ALBUTEROL IN, Inhale 1 inhaler As Needed., Disp: , Rfl:   •  aspirin 81 MG tablet, Take 81 mg by mouth Daily., Disp: , Rfl:   •  Bystolic 2.5 MG tablet, TAKE 1 TABLET DAILY, Disp: 90 tablet, Rfl: 1  •  carbidopa-levodopa (SINEMET)  MG per tablet, Take 1.5 tablets by mouth 3 (Three) Times a Day., Disp: , Rfl:   •  cetirizine (zyrTEC) 10 MG tablet, Take 10 mg by mouth Daily., Disp: , Rfl:   •  Cholecalciferol 10 MCG/ML liquid liquid, Take 400 Units by mouth Daily., Disp: , Rfl:   •  dimenhyDRINATE (DRAMAMINE) 50 MG tablet, Take 50 mg by mouth At Night As Needed for movement disorders., Disp: , Rfl:   •  famotidine (Pepcid) 40 MG tablet, 40 mg., Disp: , Rfl:   •  Ferrous Fumarate 325 (106 Fe) MG tablet, Take 1 tablet by mouth., Disp: , Rfl:   •  fluticasone (Flovent HFA) 110 MCG/ACT inhaler, , Disp: , Rfl:   •  magnesium oxide (MAG-OX) 400 MG tablet, Take 400 mg by mouth 2 (Two) Times a Day., Disp: , Rfl:   •  meclizine (ANTIVERT) 25 MG tablet, Take 25 mg by mouth daily., Disp: , Rfl:   •  Multiple Vitamins-Minerals (HAIR SKIN AND NAILS FORMULA PO), Take 1 tablet by mouth Daily., Disp: , Rfl:   •  ondansetron (ZOFRAN) 4 MG tablet, ondansetron HCl 4 mg tablet, Disp: , Rfl:   •  Probiotic Product (SOLUBLE  FIBER/PROBIOTICS PO), Take 1 tablet by mouth Daily., Disp: , Rfl:   •  SUMAtriptan (IMITREX) 100 MG tablet, sumatriptan 100 mg tablet, Disp: , Rfl:   •  traMADol (ULTRAM) 50 MG tablet, 50 mg., Disp: , Rfl:   •  triamcinolone (KENALOG) 0.1 % cream, triamcinolone acetonide 0.1 % topical cream, Disp: , Rfl:   •  vitamin E 400 UNIT capsule, Take 400 Units by mouth Daily., Disp: , Rfl:   •  nitroglycerin (NITROSTAT) 0.4 MG SL tablet, Place 1 tablet under the tongue Every 5 (Five) Minutes As Needed for Chest Pain., Disp: 25 tablet, Rfl: 5   Assessment:        Patient Active Problem List   Diagnosis   • Abnormal cardiovascular stress test   • Benign essential hypertension   • Chest pain   • Coronary arteriosclerosis in native artery   • Presence of stent in coronary artery   • SSS (sick sinus syndrome) (CMS/[facility])               Plan:            ICD-10-CM ICD-9-CM   1. Precordial pain  R07.2 786.51   2. Benign essential hypertension  I10 401.1   3. SSS (sick sinus syndrome) (CMS/HCC)  I49.5 427.81   4. Palpitations  R00.2 785.1     1. Precordial pain  Atypical    2. Benign essential hypertension  Blood pressure under control    3. SSS (sick sinus syndrome) (CMS/HCC)    Asymptomatic  4. Palpitations  Occasional       6 months    This patient has consented to a telehealth visit via video. The visit was scheduled as a video visit to comply with patient safety concerns in accordance with CDC recommendations.  All vitals recorded within this visit are reported by the patient.  I spent  15 minutes in total including but not limited to the 15   minutes spent in direct conversation with this patient.     COUNSELING:    Kristi Reed was given to patient for the following topics: diagnostic results, risk factor reductions, impressions, risks and benefits of treatment options and importance of treatment compliance .       SMOKING COUNSELING:    [unfilled]    Dictated using Dragon dictation

## 2021-06-22 PROBLEM — R00.2 PALPITATIONS: Status: ACTIVE | Noted: 2021-06-22

## 2021-06-24 RX ORDER — NEBIVOLOL HYDROCHLORIDE 2.5 MG/1
TABLET ORAL
Qty: 90 TABLET | Refills: 1 | Status: SHIPPED | OUTPATIENT
Start: 2021-06-24 | End: 2021-06-24 | Stop reason: SDUPTHER

## 2021-06-24 RX ORDER — NEBIVOLOL 2.5 MG/1
2.5 TABLET ORAL DAILY
Qty: 90 TABLET | Refills: 1 | Status: CANCELLED | OUTPATIENT
Start: 2021-06-24

## 2021-06-24 RX ORDER — NEBIVOLOL 2.5 MG/1
2.5 TABLET ORAL DAILY
Qty: 90 TABLET | Refills: 1 | Status: SHIPPED | OUTPATIENT
Start: 2021-06-24 | End: 2021-07-12 | Stop reason: SDUPTHER

## 2021-07-12 RX ORDER — NEBIVOLOL 2.5 MG/1
2.5 TABLET ORAL DAILY
Qty: 90 TABLET | Refills: 1 | Status: SHIPPED | OUTPATIENT
Start: 2021-07-12 | End: 2021-07-12 | Stop reason: SDUPTHER

## 2021-07-12 NOTE — TELEPHONE ENCOUNTER
----- Message from Giuliano Chi sent at 7/12/2021  1:10 PM EDT -----  Regarding: med refill request failed  Pt requested bystolic 2.5 on 6/24 and transmission to pharmacy failed. She is still in need of this medication.    Please send to mail order pharmacy f) [phone]

## 2021-07-13 RX ORDER — NEBIVOLOL 2.5 MG/1
2.5 TABLET ORAL DAILY
Qty: 90 TABLET | Refills: 1 | Status: SHIPPED | OUTPATIENT
Start: 2021-07-13 | End: 2022-02-14 | Stop reason: SDUPTHER

## 2021-07-20 ENCOUNTER — OFFICE (AMBULATORY)
Dept: URBAN - METROPOLITAN AREA CLINIC 75 | Facility: CLINIC | Age: 71
End: 2021-07-20
Payer: COMMERCIAL

## 2021-07-20 VITALS
OXYGEN SATURATION: 99 % | RESPIRATION RATE: 18 BRPM | WEIGHT: 180.8 LBS | SYSTOLIC BLOOD PRESSURE: 132 MMHG | DIASTOLIC BLOOD PRESSURE: 68 MMHG | HEART RATE: 81 BPM | HEIGHT: 63 IN

## 2021-07-20 DIAGNOSIS — R93.3 ABNORMAL FINDINGS ON DIAGNOSTIC IMAGING OF OTHER PARTS OF DI: ICD-10-CM

## 2021-07-20 DIAGNOSIS — I25.10 ATHEROSCLEROTIC HEART DISEASE OF NATIVE CORONARY ARTERY WITH: ICD-10-CM

## 2021-07-20 DIAGNOSIS — K30 FUNCTIONAL DYSPEPSIA: ICD-10-CM

## 2021-07-20 DIAGNOSIS — Z79.82 LONG TERM (CURRENT) USE OF ASPIRIN: ICD-10-CM

## 2021-07-20 DIAGNOSIS — R13.10 DYSPHAGIA, UNSPECIFIED: ICD-10-CM

## 2021-07-20 DIAGNOSIS — G20 PARKINSON'S DISEASE: ICD-10-CM

## 2021-07-20 DIAGNOSIS — R19.7 DIARRHEA, UNSPECIFIED: ICD-10-CM

## 2021-07-20 DIAGNOSIS — K29.70 GASTRITIS, UNSPECIFIED, WITHOUT BLEEDING: ICD-10-CM

## 2021-07-20 DIAGNOSIS — R12 HEARTBURN: ICD-10-CM

## 2021-07-20 DIAGNOSIS — R14.3 FLATULENCE: ICD-10-CM

## 2021-07-20 DIAGNOSIS — K59.00 CONSTIPATION, UNSPECIFIED: ICD-10-CM

## 2021-07-20 PROCEDURE — 99214 OFFICE O/P EST MOD 30 MIN: CPT | Performed by: NURSE PRACTITIONER

## 2021-07-20 RX ORDER — METRONIDAZOLE 500 MG/1
1500 TABLET, FILM COATED ORAL
Qty: 42 | Refills: 0 | Status: COMPLETED
Start: 2021-07-20 | End: 2023-01-11

## 2021-09-08 ENCOUNTER — OFFICE VISIT (OUTPATIENT)
Dept: ORTHOPEDIC SURGERY | Facility: CLINIC | Age: 71
End: 2021-09-08

## 2021-09-08 VITALS — BODY MASS INDEX: 30.83 KG/M2 | WEIGHT: 174 LBS | OXYGEN SATURATION: 98 % | HEART RATE: 103 BPM | HEIGHT: 63 IN

## 2021-09-08 DIAGNOSIS — M25.562 LEFT KNEE PAIN, UNSPECIFIED CHRONICITY: ICD-10-CM

## 2021-09-08 DIAGNOSIS — M17.12 PRIMARY OSTEOARTHRITIS OF LEFT KNEE: Primary | ICD-10-CM

## 2021-09-08 PROCEDURE — 20610 DRAIN/INJ JOINT/BURSA W/O US: CPT | Performed by: ORTHOPAEDIC SURGERY

## 2021-09-08 PROCEDURE — 99213 OFFICE O/P EST LOW 20 MIN: CPT | Performed by: ORTHOPAEDIC SURGERY

## 2021-09-08 RX ORDER — METHYLPREDNISOLONE ACETATE 80 MG/ML
80 INJECTION, SUSPENSION INTRA-ARTICULAR; INTRALESIONAL; INTRAMUSCULAR; SOFT TISSUE
Status: COMPLETED | OUTPATIENT
Start: 2021-09-08 | End: 2021-09-08

## 2021-09-08 RX ORDER — LIDOCAINE HYDROCHLORIDE 10 MG/ML
9 INJECTION, SOLUTION INFILTRATION; PERINEURAL
Status: COMPLETED | OUTPATIENT
Start: 2021-09-08 | End: 2021-09-08

## 2021-09-08 RX ORDER — BACLOFEN 10 MG/1
10 TABLET ORAL 3 TIMES DAILY
COMMUNITY
Start: 2021-08-23

## 2021-09-08 RX ADMIN — METHYLPREDNISOLONE ACETATE 80 MG: 80 INJECTION, SUSPENSION INTRA-ARTICULAR; INTRALESIONAL; INTRAMUSCULAR; SOFT TISSUE at 10:28

## 2021-09-08 RX ADMIN — LIDOCAINE HYDROCHLORIDE 9 ML: 10 INJECTION, SOLUTION INFILTRATION; PERINEURAL at 10:28

## 2021-09-08 NOTE — PROGRESS NOTES
"Chief Complaint  Pain of the Left Knee     Subjective      Kristi Ibrahim presents to [facility] for an evaluation of left knee. Patient has a history of arthritis in bilateral knees but started having increasing left knee pain that started in May. She states she was walking up a steep driveway during that time which may have been the cause of her knee pain. She had joint line pain but recently the pain is more anteriorly. She is using a cane for ambulation assistance because of her Parkinson's and because of the back surgery she had in December.       Allergies   Allergen Reactions   • Ace Inhibitors Unknown (See Comments)     NOT  SURE   • Acetaminophen Swelling   • Brompheniramine Unknown (See Comments)     NOT SURE   • Codeine Nausea And Vomiting   • Codeine Sulfate    • Diclofenac Urinary Retention     GI PAIN - ULCERS???   • Flurbiprofen Unknown (See Comments)     NOT SURE   • Ibuprofen Unknown (See Comments)     NOT SURE   • Indocyanine Green Unknown (See Comments)     NOT SURE   • Pantoprazole Nausea And Vomiting   • Propoxyphene Unknown (See Comments)     NOT SURE   • Iodinated Diagnostic Agents Rash        Social History     Socioeconomic History   • Marital status:      Spouse name: Not on file   • Number of children: Not on file   • Years of education: Not on file   • Highest education level: Not on file   Tobacco Use   • Smoking status: Never Smoker   • Smokeless tobacco: Never Used   Vaping Use   • Vaping Use: Never used   Substance and Sexual Activity   • Alcohol use: Never   • Drug use: Never   • Sexual activity: Defer     Birth control/protection: Post-menopausal        Review of Systems     Objective   Vital Signs:   Pulse 103   Ht 160 cm (63\")   Wt 78.9 kg (174 lb)   SpO2 98%   BMI 30.82 kg/m²       Physical Exam  Constitutional:       Appearance: Normal appearance. Patient is well-developed and normal weight.   HENT:      Head: Normocephalic.      Right Ear: " Hearing and external ear normal.      Left Ear: Hearing and external ear normal.      Nose: Nose normal.   Eyes:      Conjunctiva/sclera: Conjunctivae normal.   Cardiovascular:      Rate and Rhythm: Normal rate.   Pulmonary:      Effort: Pulmonary effort is normal.      Breath sounds: No wheezing or rales.   Abdominal:      Palpations: Abdomen is soft.      Tenderness: There is no abdominal tenderness.   Musculoskeletal:      Cervical back: Normal range of motion.   Skin:     Findings: No rash.   Neurological:      Mental Status: Patient  is alert and oriented to person, place, and time.   Psychiatric:         Mood and Affect: Mood and affect normal.         Judgment: Judgment normal.       Ortho Exam      LEFT KNEE: Good strength to hamstrings, quadriceps, dorsiflexors and plantar flexors. Ambulation assistance with a cane. Antalgic gait. Tender medial joint line. Non-tender lateral joint line. Dorsal Pedal Pulse 2+, posterior tibialis pulse 2+. No swelling, skin discoloration or atrophy. Stable to varus/valgus stress. Negative Lachman. Sensation grossly intact. Neurovascular intact.       Large Joint Arthrocentesis: L knee  Date/Time: 9/8/2021 10:28 AM  Consent given by: patient  Site marked: site marked  Timeout: Immediately prior to procedure a time out was called to verify the correct patient, procedure, equipment, support staff and site/side marked as required   Supporting Documentation  Indications: pain   Procedure Details  Location: knee - L knee  Needle size: 22 G  Medications administered: 9 mL lidocaine 1 %; 80 mg methylPREDNISolone acetate 80 MG/ML  Patient tolerance: patient tolerated the procedure well with no immediate complications              Imaging Results (Most Recent)     Procedure Component Value Units Date/Time    XR Knee 3 View Left [[id number]] Resulted: 09/08/21 0939     Updated: 09/08/21 0945           Result Review :       X-Ray Report:  Left knee(s) X-Ray  Indication: Evaluation of left  knee pain   AP, Lateral and Standing view(s)  Findings: There is moderate degenerative changes.   Prior studies available for comparison: no       Assessment and Plan     DX: Left knee osteoarthritis     Patient has a lot going on at this time. She wishes to continue conservative measures. She was given a left knee steroid injection and tolerated this procedure well.     Call or return if worsening symptoms.    Follow Up     PRN.       Patient was given instructions and counseling regarding her condition or for health maintenance advice. Please see specific information pulled into the AVS if appropriate.     Scribed for Eduin Kingston MD by Jordana Love.  09/08/21   09:55 EDT      I have personally performed the services described in this document as scribed by the above individual and it is both accurate and complete. Eduin Kingston MD 09/08/21

## 2021-09-18 NOTE — TELEPHONE ENCOUNTER
----- Message from Domitila Allen sent at 7/3/2018 12:19 PM EDT -----  Regarding: HOLTER RESULTS  PLEASE CALL PT SAID SHE RECEIVED A CALL ABOUT ABNORMAL HOLTER RESULTS BUT COULDN'T REMEMBER THE NAME OF THE PERSON.  [phone]   normal...

## 2021-09-20 ENCOUNTER — TELEPHONE (OUTPATIENT)
Dept: CARDIOLOGY | Facility: CLINIC | Age: 71
End: 2021-09-20

## 2021-10-01 ENCOUNTER — OFFICE VISIT (OUTPATIENT)
Dept: CARDIOLOGY | Facility: CLINIC | Age: 71
End: 2021-10-01

## 2021-10-01 VITALS
SYSTOLIC BLOOD PRESSURE: 150 MMHG | WEIGHT: 173 LBS | HEART RATE: 74 BPM | HEIGHT: 64 IN | DIASTOLIC BLOOD PRESSURE: 61 MMHG | BODY MASS INDEX: 29.53 KG/M2

## 2021-10-01 DIAGNOSIS — I10 BENIGN ESSENTIAL HYPERTENSION: ICD-10-CM

## 2021-10-01 DIAGNOSIS — I25.10 CORONARY ARTERIOSCLEROSIS IN NATIVE ARTERY: Primary | ICD-10-CM

## 2021-10-01 DIAGNOSIS — R00.2 PALPITATIONS: ICD-10-CM

## 2021-10-01 DIAGNOSIS — Z01.818 PRE-OPERATIVE CLEARANCE: ICD-10-CM

## 2021-10-01 DIAGNOSIS — I49.5 SSS (SICK SINUS SYNDROME) (HCC): ICD-10-CM

## 2021-10-01 PROCEDURE — 93000 ELECTROCARDIOGRAM COMPLETE: CPT | Performed by: NURSE PRACTITIONER

## 2021-10-01 PROCEDURE — 99214 OFFICE O/P EST MOD 30 MIN: CPT | Performed by: NURSE PRACTITIONER

## 2021-10-01 NOTE — PROGRESS NOTES
" Subjective:        Kristi Ibrahim is a 71 y.o. female who here for follow up    No chief complaint on file.  Follow-up for CAD    HPI    This is a 71-year-old female, who is new to me.  She has a history to include CAD, arthritis, fibromyalgia, hypertension, hyperlipidemia, GERD, and history of Parkinson's.  She is here today for a 1 year follow-up.  She has been doing quite well at home.    Holter monitor April 2021 revealed frequent PVCs, bigeminy and couplets.  Stress test in 2020 indicated a normal myocardial perfusion study with no evidence of ischemia.  Echo in 2020 revealed EF 61.9%, LV diastolic function is consistent with grade 1 with high LAP.  No evidence of pericardial effusion.  Cardiac cath in 2018 revealed LAD stent widely patent with no significant stenosis patient will be treated medically.      The following portions of the patient's history were reviewed and updated as appropriate: allergies, current medications, past family history, past medical history, past social history, past surgical history and problem list.    Past Medical History:   Diagnosis Date   • Anemia, unspecified    • Arthritis    • Asthma     \"ALLERGIC\"   • Chest pain    • Coronary artery disease    • Fibromyalgia    • GERD (gastroesophageal reflux disease)    • H/O degenerative disc disease    • 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   • Hepatitis A    • Hyperlipidemia    • Hypertension    • Leg pain    • Leg swelling    • Leg weakness, bilateral 09/10/2019   • Low back pain 08/25/2020   • Lumbar herniated disc    • Lumbar stenosis 09/10/2019   • Migraines    • Mitral valve insufficiency    • Muscle cramps    • Parkinson's disease (CMS/HCC)    • Seasonal allergies    • Shortness of breath    • Spondylolisthesis of cervical region 09/10/2019   • Spondylolisthesis of lumbar region 08/25/2020   • Tricuspid regurgitation    • Voice hoarseness          reports that she has never smoked. She has never used smokeless tobacco. She reports " that she does not drink alcohol and does not use drugs.     Family History   Problem Relation Age of Onset   • Heart disease Mother    • Stroke Mother    • Arthritis Mother    • Heart disease Father    • Hypertension Father    • Arthritis Father    • Stomach cancer Father    • Heart disease Brother    • Hypertension Brother    • Arthritis Brother    • Breast cancer Sister    • Arthritis Sister    • Cancer Maternal Grandmother         HISTORY OF MALIGNANT NEOPLASM OF UPPER LOBE BRONCHUS OR LUNG        ROS     Review of Systems  Constitutional: No wt loss, fever, fatigue  Gastrointestinal: No nausea, abdominal pain  Behavioral/Psych: No insomnia or anxiety  Cardiovascular : Denies chest pain, shortness of breath, syncope and near syncope.      Objective:           Vitals and nursing note reviewed.   Constitutional:       Appearance: Well-developed.   HENT:      Head: Normocephalic.      Right Ear: External ear normal.      Left Ear: External ear normal.   Neck:      Vascular: No JVD.   Pulmonary:      Effort: Pulmonary effort is normal. No respiratory distress.      Breath sounds: Normal breath sounds. No stridor. No rales.   Cardiovascular:      Normal rate. Regular rhythm.      No gallop.   Pulses:     Intact distal pulses.   Abdominal:      General: Bowel sounds are normal. There is no distension.      Palpations: Abdomen is soft.      Tenderness: There is no abdominal tenderness. There is no guarding.   Musculoskeletal: Normal range of motion.         General: No tenderness.      Cervical back: Normal range of motion. Skin:     General: Skin is warm.   Neurological:      Mental Status: Alert and oriented to person, place, and time.      Deep Tendon Reflexes: Reflexes are normal and symmetric.   Psychiatric:         Judgment: Judgment normal.           ECG 12 Lead    Date/Time: 10/1/2021 2:41 PM  Performed by: Samaria Echeverria APRN  Authorized by: Samaria Echeverria APRN   Comparison: compared with previous  ECG from 4/26/2021  Similar to previous ECG  Rhythm: sinus rhythm  Rate: normal  BPM: 81    Clinical impression: non-specific ECG            2021  Interpretation Summary    · An abnormal monitor study.  · NSR RARE PACS, PVCS  · FREQUENT PVS, BIGEMINY, COUPLETS       2020  Interpretation Summary       · Findings consistent with a normal ECG stress test.  · Left ventricular ejection fraction is normal. (Calculated EF = 66%).  · Myocardial perfusion imaging indicates a normal myocardial perfusion study with no evidence of ischemia.  · Impressions are consistent with a low risk study.     Asymptomatic for chest pain. ECG is negative for ischemia.   Ectopy: frequent unifocal PVC's, bigeminy, couplets at baseline, none with exercise and PVC's return in recovery   B/P: Appropriate for Beta-blocker therapy  Baseline 124/82, Peak (post Lexiscan) 100/74,  Recovery:104/71- 116/82  Pharmacologic study due to inability to tolerate increasing speed and grade of treadmill due to orthopedic, mobility  Issues and Beta-blocker therapy.  Unable to participate in low level exercise due poor mobility, unable to bear weight and uses wheel chair for mobility.     Supervised by:  Samaria STALLWORTH.          Interpretation Summary    · Estimated right ventricular systolic pressure from tricuspid regurgitation is normal (<35 mmHg).  · Calculated left ventricular EF = 61.9% Estimated left ventricular EF was in agreement with the calculated left ventricular EF.  · Left ventricular diastolic function is consistent with (grade Ia w/high LAP) impaired relaxation.  · Calculated left ventricular EF = 61.9%  · There is no evidence of pericardial effusion.      2018    Impression:      1. Normal left main  2. Normal LAD including the diagonal and  branches with the proximal LAD stent widely patent  3. Circumflex artery nondominant and normal  4. RCA dominant with no significant atherosclerosis  5. Normal LV gram     Recommendations:       1. With the LAD stent widely patent no significant stenosis patient be treated medically            I sincerely appreciate the opportunity to participate in your patient's care. Please feel free to contact me anytime if I can be of assistance in this or any other way.     Malina Breen MD  4/23/2018  2:14 PM                  Current Outpatient Medications:   •  ALBUTEROL IN, Inhale 1 inhaler As Needed., Disp: , Rfl:   •  aspirin 81 MG tablet, Take 81 mg by mouth Daily., Disp: , Rfl:   •  baclofen (LIORESAL) 10 MG tablet, Take 10 mg by mouth 3 (Three) Times a Day., Disp: , Rfl:   •  carbidopa-levodopa (SINEMET)  MG per tablet, Take 1.5 tablets by mouth 3 (Three) Times a Day., Disp: , Rfl:   •  cetirizine (zyrTEC) 10 MG tablet, Take 10 mg by mouth Daily., Disp: , Rfl:   •  Cholecalciferol 10 MCG/ML liquid liquid, Take 400 Units by mouth Daily., Disp: , Rfl:   •  dimenhyDRINATE (DRAMAMINE) 50 MG tablet, Take 50 mg by mouth At Night As Needed for movement disorders., Disp: , Rfl:   •  famotidine (Pepcid) 40 MG tablet, 40 mg., Disp: , Rfl:   •  Ferrous Fumarate 325 (106 Fe) MG tablet, Take 1 tablet by mouth., Disp: , Rfl:   •  fluticasone (Flovent HFA) 110 MCG/ACT inhaler, , Disp: , Rfl:   •  magnesium oxide (MAG-OX) 400 MG tablet, Take 400 mg by mouth 2 (Two) Times a Day., Disp: , Rfl:   •  meclizine (ANTIVERT) 25 MG tablet, Take 25 mg by mouth daily., Disp: , Rfl:   •  Multiple Vitamins-Minerals (HAIR SKIN AND NAILS FORMULA PO), Take 1 tablet by mouth Daily., Disp: , Rfl:   •  nebivolol (Bystolic) 2.5 MG tablet, Take 1 tablet by mouth Daily., Disp: 90 tablet, Rfl: 1  •  nitroglycerin (NITROSTAT) 0.4 MG SL tablet, Place 1 tablet under the tongue Every 5 (Five) Minutes As Needed for Chest Pain., Disp: 25 tablet, Rfl: 5  •  ondansetron (ZOFRAN) 4 MG tablet, ondansetron HCl 4 mg tablet, Disp: , Rfl:   •  Probiotic Product (SOLUBLE FIBER/PROBIOTICS PO), Take 1 tablet by mouth Daily., Disp: , Rfl:   •   SUMAtriptan (IMITREX) 100 MG tablet, sumatriptan 100 mg tablet, Disp: , Rfl:   •  traMADol (ULTRAM) 50 MG tablet, 50 mg., Disp: , Rfl:   •  triamcinolone (KENALOG) 0.1 % cream, triamcinolone acetonide 0.1 % topical cream, Disp: , Rfl:   •  vitamin E 400 UNIT capsule, Take 400 Units by mouth Daily., Disp: , Rfl:      Assessment:        Patient Active Problem List   Diagnosis   • Abnormal cardiovascular stress test   • Benign essential hypertension   • Chest pain   • Coronary arteriosclerosis in native artery   • Presence of stent in coronary artery   • SSS (sick sinus syndrome) (HCC)   • Palpitations               Plan:   1.  Cardiac clearance: She will do a stress test and an echo.       It was explained to the patient that stress testing carries 85% specificity/sensitivity, and does not rule out future cardiac event.  Risks of the procedure were explained to the patient including shortness of breath, induction of myocardial infarction, and dizziness.  Patient is agreeable to proceeding with stress testing.        2.  CAD with history of stent: Previous lipid panel as above.  Continue aspirin, and beta-blockade.    Risk reduction for the coronary artery disease, controlling the blood pressure, blood sugar management, cholesterol management, exercise, stress management, and proper compliance with medications and follow-up has been discussed.    3. SSS with history of ZIO: Holter as above.      4.  Hypertension: Her blood pressure is slightly elevated. She states the readings are better at home. Controlled on current medications.    Educated patient on exercising for at least 30 minutes a day for 2 to 3 days a week. Importance of controlling hypertension and blood pressure checkup on the regular basis has been explained. Hypertension as a silent killer has been discussed. Risk reduction of the weight and regular exercises to control the hypertension has been explained.    5.  Palpitations: Controlled continue  beta-blocker.    6. Shortness of breath             No diagnosis found.    There are no diagnoses linked to this encounter.    COUNSELING: Dallin Carter was given to patient for the following topics: diagnostic results, risk factor reductions, impressions, risks and benefits of treatment options and importance of treatment compliance .       SMOKING COUNSELING: Denies    She will have a lexiscan as she can not do treadmill due to her L foot and L knee. If ok the she will be cleared for surgery and then follow up n 6 months, unless she needs to be seen sooner.    Sincerely,   BASIM Jacobs  [facility]  10/01/21  13:59 EDT    EMR Dragon/Transcription disclaimer:   Much of this encounter note is an electronic transcription/translation of spoken language to printed text. The electronic translation of spoken language may permit erroneous, or at times, nonsensical words or phrases to be inadvertently transcribed; Although I have reviewed the note for such errors, some may still exist.

## 2021-10-14 ENCOUNTER — HOSPITAL ENCOUNTER (OUTPATIENT)
Dept: CARDIOLOGY | Facility: HOSPITAL | Age: 71
Discharge: HOME | End: 2021-10-14
Admitting: NURSE PRACTITIONER

## 2021-10-14 ENCOUNTER — HOSPITAL ENCOUNTER (OUTPATIENT)
Dept: CARDIOLOGY | Facility: HOSPITAL | Age: 71
Discharge: HOME | End: 2021-10-14

## 2021-10-14 ENCOUNTER — OFFICE VISIT (OUTPATIENT)
Dept: CARDIOLOGY | Facility: CLINIC | Age: 71
End: 2021-10-14

## 2021-10-14 VITALS
WEIGHT: 173 LBS | RESPIRATION RATE: 16 BRPM | HEIGHT: 64 IN | SYSTOLIC BLOOD PRESSURE: 104 MMHG | OXYGEN SATURATION: 96 % | DIASTOLIC BLOOD PRESSURE: 64 MMHG | BODY MASS INDEX: 29.53 KG/M2 | HEART RATE: 72 BPM

## 2021-10-14 VITALS
HEIGHT: 64 IN | BODY MASS INDEX: 29.71 KG/M2 | WEIGHT: 174 LBS | SYSTOLIC BLOOD PRESSURE: 121 MMHG | HEART RATE: 73 BPM | DIASTOLIC BLOOD PRESSURE: 77 MMHG

## 2021-10-14 VITALS — HEIGHT: 63 IN | BODY MASS INDEX: 30.65 KG/M2 | WEIGHT: 173 LBS

## 2021-10-14 DIAGNOSIS — R07.2 PRECORDIAL PAIN: Primary | ICD-10-CM

## 2021-10-14 DIAGNOSIS — I25.10 CORONARY ARTERIOSCLEROSIS IN NATIVE ARTERY: ICD-10-CM

## 2021-10-14 DIAGNOSIS — I10 BENIGN ESSENTIAL HYPERTENSION: ICD-10-CM

## 2021-10-14 LAB
AORTIC ARCH: 2.7 CM
AORTIC DIMENSIONLESS INDEX: 0.8 (DI)
BH CV ECHO MEAS - ACS: 1.3 CM
BH CV ECHO MEAS - AO ARCH DIAM (PROXIMAL TRANS.): 2.7 CM
BH CV ECHO MEAS - AO MAX PG (FULL): 3.3 MMHG
BH CV ECHO MEAS - AO MAX PG: 7.5 MMHG
BH CV ECHO MEAS - AO MEAN PG (FULL): 1.8 MMHG
BH CV ECHO MEAS - AO MEAN PG: 3.9 MMHG
BH CV ECHO MEAS - AO ROOT AREA (BSA CORRECTED): 1.5
BH CV ECHO MEAS - AO ROOT AREA: 6.2 CM^2
BH CV ECHO MEAS - AO ROOT DIAM: 2.8 CM
BH CV ECHO MEAS - AO V2 MAX: 136.8 CM/SEC
BH CV ECHO MEAS - AO V2 MEAN: 93.2 CM/SEC
BH CV ECHO MEAS - AO V2 VTI: 28.3 CM
BH CV ECHO MEAS - AVA(I,A): 2.3 CM^2
BH CV ECHO MEAS - AVA(I,D): 2.3 CM^2
BH CV ECHO MEAS - AVA(V,A): 2.3 CM^2
BH CV ECHO MEAS - AVA(V,D): 2.3 CM^2
BH CV ECHO MEAS - BSA(HAYCOCK): 1.9 M^2
BH CV ECHO MEAS - BSA: 1.8 M^2
BH CV ECHO MEAS - BZI_BMI: 30.6 KILOGRAMS/M^2
BH CV ECHO MEAS - BZI_METRIC_HEIGHT: 160 CM
BH CV ECHO MEAS - BZI_METRIC_WEIGHT: 78.5 KG
BH CV ECHO MEAS - EDV(CUBED): 75.8 ML
BH CV ECHO MEAS - EDV(MOD-SP2): 62 ML
BH CV ECHO MEAS - EDV(MOD-SP4): 66 ML
BH CV ECHO MEAS - EDV(TEICH): 80 ML
BH CV ECHO MEAS - EF(CUBED): 73.9 %
BH CV ECHO MEAS - EF(MOD-BP): 55.8 %
BH CV ECHO MEAS - EF(MOD-SP2): 50 %
BH CV ECHO MEAS - EF(MOD-SP4): 62.1 %
BH CV ECHO MEAS - EF(TEICH): 66.1 %
BH CV ECHO MEAS - ESV(CUBED): 19.8 ML
BH CV ECHO MEAS - ESV(MOD-SP2): 31 ML
BH CV ECHO MEAS - ESV(MOD-SP4): 25 ML
BH CV ECHO MEAS - ESV(TEICH): 27.1 ML
BH CV ECHO MEAS - FS: 36.1 %
BH CV ECHO MEAS - IVS/LVPW: 0.92
BH CV ECHO MEAS - IVSD: 0.78 CM
BH CV ECHO MEAS - LAT PEAK E' VEL: 7.2 CM/SEC
BH CV ECHO MEAS - LV DIASTOLIC VOL/BSA (35-75): 36.3 ML/M^2
BH CV ECHO MEAS - LV MASS(C)D: 104.5 GRAMS
BH CV ECHO MEAS - LV MASS(C)DI: 57.5 GRAMS/M^2
BH CV ECHO MEAS - LV MAX PG: 4.2 MMHG
BH CV ECHO MEAS - LV MEAN PG: 2.2 MMHG
BH CV ECHO MEAS - LV SYSTOLIC VOL/BSA (12-30): 13.7 ML/M^2
BH CV ECHO MEAS - LV V1 MAX: 102.3 CM/SEC
BH CV ECHO MEAS - LV V1 MEAN: 68.1 CM/SEC
BH CV ECHO MEAS - LV V1 VTI: 21.9 CM
BH CV ECHO MEAS - LVIDD: 4.2 CM
BH CV ECHO MEAS - LVIDS: 2.7 CM
BH CV ECHO MEAS - LVLD AP2: 6.7 CM
BH CV ECHO MEAS - LVLD AP4: 6.4 CM
BH CV ECHO MEAS - LVLS AP2: 5.8 CM
BH CV ECHO MEAS - LVLS AP4: 5.4 CM
BH CV ECHO MEAS - LVOT AREA (M): 3.1 CM^2
BH CV ECHO MEAS - LVOT AREA: 3 CM^2
BH CV ECHO MEAS - LVOT DIAM: 2 CM
BH CV ECHO MEAS - LVPWD: 0.85 CM
BH CV ECHO MEAS - MED PEAK E' VEL: 6 CM/SEC
BH CV ECHO MEAS - MR MAX PG: 92.1 MMHG
BH CV ECHO MEAS - MR MAX VEL: 480 CM/SEC
BH CV ECHO MEAS - MV A DUR: 0.11 SEC
BH CV ECHO MEAS - MV A MAX VEL: 87.3 CM/SEC
BH CV ECHO MEAS - MV DEC SLOPE: 378.2 CM/SEC^2
BH CV ECHO MEAS - MV DEC TIME: 235 SEC
BH CV ECHO MEAS - MV E MAX VEL: 63.5 CM/SEC
BH CV ECHO MEAS - MV E/A: 0.73
BH CV ECHO MEAS - MV MAX PG: 3.6 MMHG
BH CV ECHO MEAS - MV MEAN PG: 1.6 MMHG
BH CV ECHO MEAS - MV P1/2T MAX VEL: 95.2 CM/SEC
BH CV ECHO MEAS - MV P1/2T: 73.7 MSEC
BH CV ECHO MEAS - MV V2 MAX: 95.2 CM/SEC
BH CV ECHO MEAS - MV V2 MEAN: 59.8 CM/SEC
BH CV ECHO MEAS - MV V2 VTI: 23.7 CM
BH CV ECHO MEAS - MVA P1/2T LCG: 2.3 CM^2
BH CV ECHO MEAS - MVA(P1/2T): 3 CM^2
BH CV ECHO MEAS - MVA(VTI): 2.8 CM^2
BH CV ECHO MEAS - PA ACC TIME: 0.03 SEC
BH CV ECHO MEAS - PA MAX PG (FULL): 1.1 MMHG
BH CV ECHO MEAS - PA MAX PG: 3 MMHG
BH CV ECHO MEAS - PA PR(ACCEL): 63.6 MMHG
BH CV ECHO MEAS - PA V2 MAX: 86.9 CM/SEC
BH CV ECHO MEAS - PI END-D VEL: 112 CM/SEC
BH CV ECHO MEAS - PULM A REVS DUR: 0.12 SEC
BH CV ECHO MEAS - PULM A REVS VEL: 32.5 CM/SEC
BH CV ECHO MEAS - PULM DIAS VEL: 50 CM/SEC
BH CV ECHO MEAS - PULM S/D: 1.6
BH CV ECHO MEAS - PULM SYS VEL: 78.6 CM/SEC
BH CV ECHO MEAS - RAP SYSTOLE: 3 MMHG
BH CV ECHO MEAS - RV MAX PG: 1.9 MMHG
BH CV ECHO MEAS - RV MEAN PG: 1.1 MMHG
BH CV ECHO MEAS - RV V1 MAX: 68.4 CM/SEC
BH CV ECHO MEAS - RV V1 MEAN: 48 CM/SEC
BH CV ECHO MEAS - RV V1 VTI: 14 CM
BH CV ECHO MEAS - RVSP: 24 MMHG
BH CV ECHO MEAS - SI(AO): 96.1 ML/M^2
BH CV ECHO MEAS - SI(CUBED): 30.8 ML/M^2
BH CV ECHO MEAS - SI(LVOT): 36.6 ML/M^2
BH CV ECHO MEAS - SI(MOD-SP2): 17 ML/M^2
BH CV ECHO MEAS - SI(MOD-SP4): 22.5 ML/M^2
BH CV ECHO MEAS - SI(TEICH): 29.1 ML/M^2
BH CV ECHO MEAS - SV(AO): 174.8 ML
BH CV ECHO MEAS - SV(CUBED): 56 ML
BH CV ECHO MEAS - SV(LVOT): 66.5 ML
BH CV ECHO MEAS - SV(MOD-SP2): 31 ML
BH CV ECHO MEAS - SV(MOD-SP4): 41 ML
BH CV ECHO MEAS - SV(TEICH): 52.9 ML
BH CV ECHO MEAS - TAPSE (>1.6): 2.3 CM
BH CV ECHO MEAS - TR MAX PG: 21 MMHG
BH CV ECHO MEAS - TR MAX VEL: 230.8 CM/SEC
BH CV ECHO MEASUREMENTS AVERAGE E/E' RATIO: 9.62
BH CV IMMEDIATE POST TECH DATA BLOOD PRESSURE: NORMAL MMHG
BH CV IMMEDIATE POST TECH DATA HEART RATE: 91 BPM
BH CV IMMEDIATE POST TECH DATA OXYGEN SATS: 97 %
BH CV REST NUCLEAR ISOTOPE DOSE: 10.9 MCI
BH CV STRESS BP STAGE 1: NORMAL
BH CV STRESS COMMENTS STAGE 1: NORMAL
BH CV STRESS DOSE REGADENOSON STAGE 1: 0.4
BH CV STRESS DURATION MIN STAGE 1: 1
BH CV STRESS DURATION SEC STAGE 1: 0
BH CV STRESS HR STAGE 1: 91
BH CV STRESS NUCLEAR ISOTOPE DOSE: 32.1 MCI
BH CV STRESS O2 STAGE 1: 98
BH CV STRESS PROTOCOL 1: NORMAL
BH CV STRESS RECOVERY BP: NORMAL MMHG
BH CV STRESS RECOVERY HR: 97 BPM
BH CV STRESS RECOVERY O2: 96 %
BH CV STRESS STAGE 1: 1
BH CV THREE MINUTE POST TECH DATA BLOOD PRESSURE: NORMAL MMHG
BH CV THREE MINUTE POST TECH DATA HEART RATE: 95 BPM
BH CV THREE MINUTE POST TECH DATA OXYGEN SATURATION: 96 %
BH CV XLRA - TDI S': 10.4 CM/SEC
LEFT ATRIUM VOLUME INDEX: 20.7 ML/M2
LV EF NUC BP: 60 %
MAXIMAL PREDICTED HEART RATE: 149 BPM
MAXIMAL PREDICTED HEART RATE: 149 BPM
PERCENT MAX PREDICTED HR: 61.07 %
SINUS: 2.6 CM
STRESS BASELINE BP: NORMAL MMHG
STRESS BASELINE HR: 74 BPM
STRESS O2 SAT REST: 96 %
STRESS PERCENT HR: 72 %
STRESS POST ESTIMATED WORKLOAD: 1 METS
STRESS POST EXERCISE DUR MIN: 1 MIN
STRESS POST EXERCISE DUR SEC: 0 SEC
STRESS POST O2 SAT PEAK: 98 %
STRESS POST PEAK BP: NORMAL MMHG
STRESS POST PEAK HR: 91 BPM
STRESS TARGET HR: 127 BPM
STRESS TARGET HR: 127 BPM

## 2021-10-14 PROCEDURE — 0 TECHNETIUM SESTAMIBI: Performed by: INTERNAL MEDICINE

## 2021-10-14 PROCEDURE — 78452 HT MUSCLE IMAGE SPECT MULT: CPT

## 2021-10-14 PROCEDURE — 93306 TTE W/DOPPLER COMPLETE: CPT

## 2021-10-14 PROCEDURE — 99213 OFFICE O/P EST LOW 20 MIN: CPT | Performed by: INTERNAL MEDICINE

## 2021-10-14 PROCEDURE — A9500 TC99M SESTAMIBI: HCPCS | Performed by: INTERNAL MEDICINE

## 2021-10-14 PROCEDURE — 93016 CV STRESS TEST SUPVJ ONLY: CPT | Performed by: INTERNAL MEDICINE

## 2021-10-14 PROCEDURE — 25010000002 REGADENOSON 0.4 MG/5ML SOLUTION: Performed by: INTERNAL MEDICINE

## 2021-10-14 PROCEDURE — 93306 TTE W/DOPPLER COMPLETE: CPT | Performed by: INTERNAL MEDICINE

## 2021-10-14 PROCEDURE — 93017 CV STRESS TEST TRACING ONLY: CPT

## 2021-10-14 PROCEDURE — 78452 HT MUSCLE IMAGE SPECT MULT: CPT | Performed by: INTERNAL MEDICINE

## 2021-10-14 PROCEDURE — 93018 CV STRESS TEST I&R ONLY: CPT | Performed by: INTERNAL MEDICINE

## 2021-10-14 RX ADMIN — REGADENOSON 0.4 MG: 0.08 INJECTION, SOLUTION INTRAVENOUS at 09:41

## 2021-10-14 RX ADMIN — TECHNETIUM TC 99M SESTAMIBI 1 DOSE: 1 INJECTION INTRAVENOUS at 07:02

## 2021-10-14 RX ADMIN — TECHNETIUM TC 99M SESTAMIBI 1 DOSE: 1 INJECTION INTRAVENOUS at 09:41

## 2021-10-14 NOTE — PROGRESS NOTES
"Cardiac Clearance   Subjective:        Kristi Ibrahim is a 71 y.o. female who here for follow up    CC  Foot surg clearance  HPI  71-year-old female for the clearance for the foot surgery with known history of the coronary artery disease benign essential arterial hypertension denies any chest pains tightness heaviness or the pressure sensation     Problems Addressed this Visit        Cardiac and Vasculature    Benign essential hypertension    Chest pain - Primary    Coronary arteriosclerosis in native artery      Diagnoses       Codes Comments    Precordial pain    -  Primary ICD-10-CM: R07.2  ICD-9-CM: 786.51     Benign essential hypertension     ICD-10-CM: I10  ICD-9-CM: 401.1     Coronary arteriosclerosis in native artery     ICD-10-CM: I25.10  ICD-9-CM: 414.01         .    The following portions of the patient's history were reviewed and updated as appropriate: allergies, current medications, past family history, past medical history, past social history, past surgical history and problem list.    Past Medical History:   Diagnosis Date   • Anemia, unspecified    • Arthritis    • Asthma     \"ALLERGIC\"   • Chest pain    • Coronary artery disease    • Fibromyalgia    • GERD (gastroesophageal reflux disease)    • H/O degenerative disc disease    • 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   • Hepatitis A    • Hyperlipidemia    • Hypertension    • Leg pain    • Leg swelling    • Leg weakness, bilateral 09/10/2019   • Low back pain 08/25/2020   • Lumbar herniated disc    • Lumbar stenosis 09/10/2019   • Migraines    • Mitral valve insufficiency    • Muscle cramps    • Parkinson's disease (HCC)    • Seasonal allergies    • Shortness of breath    • Spondylolisthesis of cervical region 09/10/2019   • Spondylolisthesis of lumbar region 08/25/2020   • Tricuspid regurgitation    • Voice hoarseness      reports that she has never smoked. She has never used smokeless tobacco. She reports that she does not drink alcohol and does not " "use drugs.   Family History   Problem Relation Age of Onset   • Heart disease Mother    • Stroke Mother    • Arthritis Mother    • Heart disease Father    • Hypertension Father    • Arthritis Father    • Stomach cancer Father    • Heart disease Brother    • Hypertension Brother    • Arthritis Brother    • Breast cancer Sister    • Arthritis Sister    • Cancer Maternal Grandmother         HISTORY OF MALIGNANT NEOPLASM OF UPPER LOBE BRONCHUS OR LUNG        Review of Systems  Constitutional: No wt loss, fever, fatigue  Gastrointestinal: No nausea, abdominal pain  Behavioral/Psych: No insomnia or anxiety   Cardiovascular no chest pains or tightness in the chest  Objective:       Physical Exam  /77   Pulse 73   Ht 161.3 cm (63.5\")   Wt 78.9 kg (174 lb)   BMI 30.34 kg/m²   General appearance: No acute changes   Neck: Trachea midline; NECK, supple, no thyromegaly or lymphadenopathy   Lungs: Normal size and shape, normal breath sounds, equal distribution of air, no rales and rhonchi   CV: S1-S2 regular, no murmurs, no rub, no gallop   Abdomen: Soft, non-tender; no masses , no abnormal abdominal sounds   Extremities: No deformity , normal color , no peripheral edema   Skin: Normal temperature, turgor and texture; no rash, ulcers          Procedures      Echocardiogram:        Current Outpatient Medications:   •  ALBUTEROL IN, Inhale 1 inhaler As Needed., Disp: , Rfl:   •  aspirin 81 MG tablet, Take 81 mg by mouth Daily., Disp: , Rfl:   •  baclofen (LIORESAL) 10 MG tablet, Take 10 mg by mouth 3 (Three) Times a Day., Disp: , Rfl:   •  carbidopa-levodopa (SINEMET)  MG per tablet, Take 1.5 tablets by mouth 3 (Three) Times a Day., Disp: , Rfl:   •  cetirizine (zyrTEC) 10 MG tablet, Take 10 mg by mouth Daily., Disp: , Rfl:   •  Cholecalciferol 10 MCG/ML liquid liquid, Take 400 Units by mouth Daily., Disp: , Rfl:   •  dimenhyDRINATE (DRAMAMINE) 50 MG tablet, Take 50 mg by mouth At Night As Needed for movement " disorders., Disp: , Rfl:   •  famotidine (Pepcid) 40 MG tablet, 40 mg., Disp: , Rfl:   •  Ferrous Fumarate 325 (106 Fe) MG tablet, Take 1 tablet by mouth., Disp: , Rfl:   •  fluticasone (Flovent HFA) 110 MCG/ACT inhaler, , Disp: , Rfl:   •  magnesium oxide (MAG-OX) 400 MG tablet, Take 400 mg by mouth 2 (Two) Times a Day., Disp: , Rfl:   •  meclizine (ANTIVERT) 25 MG tablet, Take 25 mg by mouth daily., Disp: , Rfl:   •  Multiple Vitamins-Minerals (HAIR SKIN AND NAILS FORMULA PO), Take 1 tablet by mouth Daily., Disp: , Rfl:   •  nebivolol (Bystolic) 2.5 MG tablet, Take 1 tablet by mouth Daily., Disp: 90 tablet, Rfl: 1  •  nitroglycerin (NITROSTAT) 0.4 MG SL tablet, Place 1 tablet under the tongue Every 5 (Five) Minutes As Needed for Chest Pain., Disp: 25 tablet, Rfl: 5  •  ondansetron (ZOFRAN) 4 MG tablet, ondansetron HCl 4 mg tablet, Disp: , Rfl:   •  Probiotic Product (SOLUBLE FIBER/PROBIOTICS PO), Take 1 tablet by mouth Daily., Disp: , Rfl:   •  SUMAtriptan (IMITREX) 100 MG tablet, sumatriptan 100 mg tablet, Disp: , Rfl:   •  traMADol (ULTRAM) 50 MG tablet, 50 mg., Disp: , Rfl:   •  triamcinolone (KENALOG) 0.1 % cream, triamcinolone acetonide 0.1 % topical cream, Disp: , Rfl:   •  vitamin E 400 UNIT capsule, Take 400 Units by mouth Daily., Disp: , Rfl:   No current facility-administered medications for this visit.   Assessment:        Patient Active Problem List   Diagnosis   • Abnormal cardiovascular stress test   • Benign essential hypertension   • Chest pain   • Coronary arteriosclerosis in native artery   • Presence of stent in coronary artery   • SSS (sick sinus syndrome) (HCC)   • Palpitations               Plan:            ICD-10-CM ICD-9-CM   1. Precordial pain  R07.2 786.51   2. Benign essential hypertension  I10 401.1   3. Coronary arteriosclerosis in native artery  I25.10 414.01     1. Precordial pain  No angina pectoris    2. Benign essential hypertension  Continue current treatment    3. Coronary  arteriosclerosis in native artery  No angina pectoris      Kristi Ibrahim seen and examined with no clinical signs of angina or chf, pt is cleared for surgery with non modifiable risk factors.  Kristi Ibrahim has been advised to take cardiac meds with sip of water on the day of surgery.    Please use beta blocker for tachycardia perioperatively    Anticoagulation to be managed appropriately    Watch for chest pain, shortness of breath, palpitations, arrhythmias, and significant change in the blood pressure perioperatively,     Please check EKG preop and postop if any questions, notify us if any change in patient's cardiovascular conditions    Specificity and sensitivity of the stress test/ cardiac workup has been explained. Pt has been explained if  Symptoms continue please go to ER, and further w/p will be required.    Also explained this does not rule out coronary artery disease or the future events, continue to emphasize on risk reductions for coronary artery disease    Pt also advised to contact PCP for other causes of symptoms    COUNSELING:    Kristi IbrahimHeaven was given to patient for the following topics: diagnostic results, risk factor reductions, impressions, risks and benefits of treatment options and importance of treatment compliance .       SMOKING COUNSELING:    [unfilled]    Dictated using Dragon dictation

## 2022-01-28 ENCOUNTER — OFFICE VISIT (OUTPATIENT)
Dept: OTOLARYNGOLOGY | Facility: CLINIC | Age: 72
End: 2022-01-28

## 2022-01-28 VITALS — HEIGHT: 64 IN | BODY MASS INDEX: 29.37 KG/M2 | WEIGHT: 172 LBS | TEMPERATURE: 96.6 F

## 2022-01-28 DIAGNOSIS — J38.7 PRESBYLARYNX: ICD-10-CM

## 2022-01-28 DIAGNOSIS — R49.0 VOICE HOARSENESS: Primary | ICD-10-CM

## 2022-01-28 PROCEDURE — 99214 OFFICE O/P EST MOD 30 MIN: CPT | Performed by: OTOLARYNGOLOGY

## 2022-01-28 RX ORDER — OXYCODONE AND ACETAMINOPHEN 7.5; 325 MG/1; MG/1
1 TABLET ORAL 2 TIMES DAILY
COMMUNITY
Start: 2021-12-09 | End: 2022-08-01

## 2022-01-28 RX ORDER — PROMETHAZINE HYDROCHLORIDE 25 MG/1
TABLET ORAL
COMMUNITY
Start: 2021-11-15 | End: 2022-02-14

## 2022-01-28 RX ORDER — NALOXONE HYDROCHLORIDE 4 MG/.1ML
SPRAY NASAL
COMMUNITY
Start: 2021-12-10 | End: 2022-02-14

## 2022-01-28 RX ORDER — LIDOCAINE 50 MG/G
PATCH TOPICAL
COMMUNITY
Start: 2021-11-10

## 2022-01-28 NOTE — PROGRESS NOTES
"Patient Name: Kristi Ibrahim   Visit Date: 01/28/2022   Patient ID: [id number]  Provider: Jameson Lira MD    Sex: female  Location: [facility]   YOB: 1950  Location Address: [address] [place] [address], [address],?[place]?[id number]    Primary Care Provider Alfreda Agrawal MD  Location Phone: [phone]    Referring Provider: No ref. provider found        Chief Complaint  Hoarse    History of Present Illness  Kristi Ibrahim is a 71 y.o. female with past medical history significant for hyperlipidemia, spinal stenosis, asthma, and Parkinson's disease who returns today for follow-up of hoarseness.  She was originally seen on 11/6/2019 at which time she reported constant hoarseness as well as a nonproductive cough which has been present for years.  Examination that day revealed evidence of reflux and hyperfunctionality when she was placed on twice daily ranitidine.  She also has a diagnosis of Parkinson's disease but this was relatively new at the time.  She was then seen on 8/28/2020  where her hoarseness was stable.  We discussed hyperfunctional dysphonia and she elected to defer speech therapy.     She returns today for follow-up.  She tells me that she has been having more difficulty with hoarseness with voice use.  She finds it difficult to hold a conversation.  He does not sore.  She denies any issues with dysphagia.  She does have some xerostomia and reflux for which she is currently taking famotidine.  She is currently taking carbidopa levodopa for her Parkinson's.      Past Medical History:   Diagnosis Date   • Anemia, unspecified    • Arthritis    • Asthma     \"ALLERGIC\"   • Chest pain    • Coronary artery disease    • Fibromyalgia    • GERD (gastroesophageal reflux disease)    • H/O degenerative disc disease    • 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   • Hepatitis A    • Hyperlipidemia    • Hypertension    • Leg pain    • Leg swelling    • Leg weakness, bilateral 09/10/2019   • " Low back pain 08/25/2020   • Lumbar herniated disc    • Lumbar stenosis 09/10/2019   • Migraines    • Mitral valve insufficiency    • Muscle cramps    • Parkinson's disease (HCC)    • Seasonal allergies    • Shortness of breath    • Spondylolisthesis of cervical region 09/10/2019   • Spondylolisthesis of lumbar region 08/25/2020   • Tricuspid regurgitation    • Voice hoarseness        Past Surgical History:   Procedure Laterality Date   • CARDIAC CATHETERIZATION     • CARDIAC CATHETERIZATION N/A 4/23/2018    Procedure: Left Heart Cath;  Surgeon: Malina Breen MD;  Location:  [facility];  Service: Cardiovascular   • CARDIAC CATHETERIZATION N/A 4/23/2018    Procedure: Coronary angiography;  Surgeon: Malina Breen MD;  Location:  [facility];  Service: Cardiovascular   • CARDIAC CATHETERIZATION N/A 4/23/2018    Procedure: Left ventriculography;  Surgeon: Malina Breen MD;  Location:  [facility];  Service: Cardiovascular   • CARPAL TUNNEL RELEASE Bilateral    • CHOLECYSTECTOMY     • COLONOSCOPY     • CORONARY ANGIOPLASTY     • FOOT SURGERY     • HAND SURGERY     • KNEE ARTHROSCOPY Left    • OTHER SURGICAL HISTORY      JOINT SURGERY   • RIB BIOPSY, RIB RESECTION     • SPINAL FUSION     • TONSILLECTOMY AND ADENOIDECTOMY     • TUBAL ABDOMINAL LIGATION           Current Outpatient Medications:   •  ALBUTEROL IN, Inhale 1 inhaler As Needed., Disp: , Rfl:   •  aspirin 81 MG tablet, Take 81 mg by mouth Daily., Disp: , Rfl:   •  baclofen (LIORESAL) 10 MG tablet, Take 10 mg by mouth 3 (Three) Times a Day., Disp: , Rfl:   •  carbidopa-levodopa (SINEMET)  MG per tablet, Take 1.5 tablets by mouth 3 (Three) Times a Day., Disp: , Rfl:   •  cetirizine (zyrTEC) 10 MG tablet, Take 10 mg by mouth Daily., Disp: , Rfl:   •  Cholecalciferol 10 MCG/ML liquid liquid, Take 400 Units by mouth Daily., Disp: , Rfl:   •  dimenhyDRINATE (DRAMAMINE) 50 MG tablet, Take  50 mg by mouth At Night As Needed for movement disorders., Disp: , Rfl:   •  famotidine (Pepcid) 40 MG tablet, 40 mg., Disp: , Rfl:   •  Ferrous Fumarate 325 (106 Fe) MG tablet, Take 1 tablet by mouth., Disp: , Rfl:   •  fluticasone (Flovent HFA) 110 MCG/ACT inhaler, , Disp: , Rfl:   •  lidocaine (LIDODERM) 5 %, , Disp: , Rfl:   •  magnesium oxide (MAG-OX) 400 MG tablet, Take 400 mg by mouth 2 (Two) Times a Day., Disp: , Rfl:   •  meclizine (ANTIVERT) 25 MG tablet, Take 25 mg by mouth daily., Disp: , Rfl:   •  Multiple Vitamins-Minerals (HAIR SKIN AND NAILS FORMULA PO), Take 1 tablet by mouth Daily., Disp: , Rfl:   •  Narcan 4 MG/0.1ML nasal spray, ADMINISTER A SINGLE SPRAY INTRANASALLY INTO ONE NOSTRIL. CALL 911. MAY REPEAT X1., Disp: , Rfl:   •  nebivolol (Bystolic) 2.5 MG tablet, Take 1 tablet by mouth Daily., Disp: 90 tablet, Rfl: 1  •  nitroglycerin (NITROSTAT) 0.4 MG SL tablet, Place 1 tablet under the tongue Every 5 (Five) Minutes As Needed for Chest Pain., Disp: 25 tablet, Rfl: 5  •  ondansetron (ZOFRAN) 4 MG tablet, ondansetron HCl 4 mg tablet, Disp: , Rfl:   •  oxyCODONE-acetaminophen (PERCOCET) 7.5-325 MG per tablet, Take 1 tablet by mouth 2 (Two) Times a Day., Disp: , Rfl:   •  Probiotic Product (SOLUBLE FIBER/PROBIOTICS PO), Take 1 tablet by mouth Daily., Disp: , Rfl:   •  promethazine (PHENERGAN) 25 MG tablet, , Disp: , Rfl:   •  SUMAtriptan (IMITREX) 100 MG tablet, sumatriptan 100 mg tablet, Disp: , Rfl:   •  traMADol (ULTRAM) 50 MG tablet, 50 mg., Disp: , Rfl:   •  triamcinolone (KENALOG) 0.1 % cream, triamcinolone acetonide 0.1 % topical cream, Disp: , Rfl:   •  vitamin E 400 UNIT capsule, Take 400 Units by mouth Daily., Disp: , Rfl:      Allergies   Allergen Reactions   • Nickel Rash   • Rapeseed [Brassica Napus Seed Oil] Hives     Had to go to ER for treatment.   • Ace Inhibitors Unknown (See Comments)     NOT  SURE   • Acetaminophen Swelling   • Brompheniramine Unknown (See Comments)     NOT SURE  "  • Codeine Nausea And Vomiting   • Codeine Sulfate    • Diclofenac Urinary Retention     GI PAIN - ULCERS???   • Flurbiprofen Unknown (See Comments)     NOT SURE   • Ibuprofen Unknown (See Comments)     NOT SURE   • Indocyanine Green Unknown (See Comments)     NOT SURE   • Pantoprazole Nausea And Vomiting   • Propoxyphene Unknown (See Comments)     NOT SURE   • Iodinated Diagnostic Agents Rash       Social History     Tobacco Use   • Smoking status: Never Smoker   • Smokeless tobacco: Never Used   Vaping Use   • Vaping Use: Never used   Substance Use Topics   • Alcohol use: Never   • Drug use: Never        Objective     Vital Signs:   Temp 96.6 °F (35.9 °C) (Temporal)   Ht 161.3 cm (63.5\")   Wt 78 kg (172 lb)   BMI 29.99 kg/m²       Physical Exam    General: Well developed, well nourished patient of stated age in no acute distress. Voice is strong and clear.   Head: Normocephalic and atraumatic.  Face: No lesions.  Bilateral parotid and submandibular glands are unremarkable.  Stensen's and Warthin's ducts are productive of clear saliva bilaterally.  House-Brackmann I/VI     bilaterally.   muscles and temporomandibular joint nontender to palpation.  No TMJ crepitus.  Eyes: PERRLA, sclerae anicteric, no conjunctival injection. Extra ocular movements are intact and full. No nystagmus.   Ears: Auricles are normal in appearance. Bilateral external auditory canals are unremarkable. Bilateral tympanic membranes are clear and without effusion. Hearing normal to conversational voice.   Nose: External nose is normal in appearance. Bilateral nares are patent with normal appearing mucosa. Septum midline. Turbinates are unremarkable. No lesions.   Oral Cavity: Lips are normal in appearance. Oral mucosa is unremarkable. Gingiva is unremarkable. Normal dentition for age. Tongue is unremarkable with good movement. Hard palate is unremarkable.   Oropharynx: Soft palate is unremarkable with full movement. Uvula is " unremarkable. Bilateral tonsils are unremarkable. Posterior oropharynx is unremarkable.    Larynx and hypopharynx: Deferred secondary to gag reflex.  Neck: Supple.  No mass.  Nontender to palpation.  Trachea midline. Thyroid normal size and without nodules to palpation.   Lymphatic: No lymphadenopathy upon palpation.  Respiratory: Clear to auscultation bilaterally, nonlabored respirations    Cardiovascular: RRR, no murmurs, rubs, or gallops,   Psychiatric: Appropriate affect, cooperative   Neurologic: Oriented x 3, strength symmetric in all extremities, Cranial Nerves II-XII are grossly intact to confrontation   Skin: Warm and dry. No rashes.    Procedures     Procedure: Flexible fiberoptic nasolaryngoscopy.    Indications: Persistent hoarseness.    Summary: The patient's bilateral nares were decongested and anesthetized with Afrin and lidocaine sprays respectively. After giving the medications ample time to take effect flexible fiberoptic scope was inserted in the patient's right naris revealing a normal-appearing nasal cavity and nasopharynx. The base of tongue, vallecula, epiglottis, aryepiglottic folds, and arytenoid cartilages are all normal-appearing.The piriform sinuses were normal in appearance. The bilateral false vocal folds are normal in appearance.  The bilateral true vocal folds are slightly bowed.  There is a small glottal gap upon phonation and she does experience some hyper functionality of her supraglottis as well. The subglottis was widely patent. The patient tolerated the procedure well.      Result Review :               Assessment and Plan    Diagnoses and all orders for this visit:    1. Voice hoarseness (Primary)  -     Ambulatory Referral to Speech Therapy    2. Presbylarynx  -     Ambulatory Referral to Speech Therapy    Impressions and findings were discussed.  Currently, she is seen for evaluation of hoarseness which has become more noticeable over the past year with conversation.   Examination today reveals findings concerning for presbylarynx as the vocal folds are slightly bowed and there is a small glottal gap upon phonation.  She is also hyperfunctional but this seems to only be evident when undergoing endoscopic examination.  We also discussed the Parkinson's may be playing a role in her hoarseness as well.  Options for further evaluation management were discussed and she is open for trial of speech therapy.        Follow Up   Return if symptoms worsen or fail to improve.  Patient was given instructions and counseling regarding her condition or for health maintenance advice. Please see specific information pulled into the AVS if appropriate.

## 2022-02-14 ENCOUNTER — OFFICE VISIT (OUTPATIENT)
Dept: FAMILY MEDICINE CLINIC | Facility: CLINIC | Age: 72
End: 2022-02-14

## 2022-02-14 VITALS
WEIGHT: 180 LBS | HEART RATE: 46 BPM | RESPIRATION RATE: 17 BRPM | BODY MASS INDEX: 30.73 KG/M2 | OXYGEN SATURATION: 98 % | DIASTOLIC BLOOD PRESSURE: 53 MMHG | TEMPERATURE: 97.6 F | SYSTOLIC BLOOD PRESSURE: 134 MMHG | HEIGHT: 64 IN

## 2022-02-14 DIAGNOSIS — D50.9 IRON DEFICIENCY ANEMIA, UNSPECIFIED IRON DEFICIENCY ANEMIA TYPE: ICD-10-CM

## 2022-02-14 DIAGNOSIS — G43.109 MIGRAINE WITH AURA AND WITHOUT STATUS MIGRAINOSUS, NOT INTRACTABLE: ICD-10-CM

## 2022-02-14 DIAGNOSIS — I49.5 SSS (SICK SINUS SYNDROME): ICD-10-CM

## 2022-02-14 DIAGNOSIS — Z78.0 POSTMENOPAUSAL: ICD-10-CM

## 2022-02-14 DIAGNOSIS — G20 PARKINSON DISEASE: ICD-10-CM

## 2022-02-14 DIAGNOSIS — I10 BENIGN ESSENTIAL HYPERTENSION: Primary | ICD-10-CM

## 2022-02-14 DIAGNOSIS — I25.10 CORONARY ARTERIOSCLEROSIS IN NATIVE ARTERY: ICD-10-CM

## 2022-02-14 DIAGNOSIS — J45.20 MILD INTERMITTENT ASTHMA, UNSPECIFIED WHETHER COMPLICATED: ICD-10-CM

## 2022-02-14 PROBLEM — G24.9 DYSKINESIA: Status: ACTIVE | Noted: 2021-08-23

## 2022-02-14 PROBLEM — R25.2 MUSCLE CRAMPS: Status: ACTIVE | Noted: 2022-02-14

## 2022-02-14 PROBLEM — M47.816 LUMBAR SPONDYLOSIS: Status: ACTIVE | Noted: 2019-03-19

## 2022-02-14 PROBLEM — R10.11 RUQ ABDOMINAL PAIN: Status: ACTIVE | Noted: 2018-04-27

## 2022-02-14 PROBLEM — R29.898 LEG WEAKNESS, BILATERAL: Status: ACTIVE | Noted: 2019-09-10

## 2022-02-14 PROBLEM — M19.90 ARTHRITIS: Status: ACTIVE | Noted: 2022-02-14

## 2022-02-14 PROBLEM — M43.12 SPONDYLOLISTHESIS OF CERVICAL REGION: Status: ACTIVE | Noted: 2019-09-10

## 2022-02-14 PROBLEM — J45.909 ASTHMA: Status: ACTIVE | Noted: 2022-02-14

## 2022-02-14 PROBLEM — D18.00 HEMANGIOMA: Status: ACTIVE | Noted: 2019-09-10

## 2022-02-14 PROBLEM — J30.2 SEASONAL ALLERGIC RHINITIS: Status: ACTIVE | Noted: 2022-02-14

## 2022-02-14 PROBLEM — Z79.891 LONG-TERM CURRENT USE OF OPIATE ANALGESIC: Status: ACTIVE | Noted: 2019-07-08

## 2022-02-14 PROBLEM — G24.9 DYSTONIA: Status: ACTIVE | Noted: 2021-01-25

## 2022-02-14 PROBLEM — G20.A1 PARKINSON DISEASE: Status: ACTIVE | Noted: 2019-06-14

## 2022-02-14 PROBLEM — Z79.4 ENCOUNTER FOR LONG-TERM (CURRENT) USE OF INSULIN: Status: ACTIVE | Noted: 2019-03-19

## 2022-02-14 PROBLEM — D64.9 ANEMIA: Status: ACTIVE | Noted: 2022-02-14

## 2022-02-14 PROBLEM — R06.02 SHORTNESS OF BREATH: Status: ACTIVE | Noted: 2022-02-14

## 2022-02-14 PROBLEM — M79.89 LEG SWELLING: Status: ACTIVE | Noted: 2022-02-14

## 2022-02-14 PROBLEM — R42 DIZZINESS: Status: ACTIVE | Noted: 2020-01-13

## 2022-02-14 PROBLEM — M51.36 DEGENERATION OF LUMBAR INTERVERTEBRAL DISC: Status: ACTIVE | Noted: 2019-03-19

## 2022-02-14 PROBLEM — B15.9 HEPATITIS A: Status: ACTIVE | Noted: 2022-02-14

## 2022-02-14 PROBLEM — K21.9 GERD (GASTROESOPHAGEAL REFLUX DISEASE): Status: ACTIVE | Noted: 2022-02-14

## 2022-02-14 PROBLEM — M54.16 LEFT LUMBAR RADICULOPATHY: Status: ACTIVE | Noted: 2019-06-14

## 2022-02-14 PROBLEM — M51.369 DEGENERATION OF LUMBAR INTERVERTEBRAL DISC: Status: ACTIVE | Noted: 2019-03-19

## 2022-02-14 PROBLEM — M79.606 LEG PAIN: Status: ACTIVE | Noted: 2022-02-14

## 2022-02-14 LAB
ALBUMIN SERPL-MCNC: 4.4 G/DL (ref 3.5–5.2)
ALBUMIN/GLOB SERPL: 1.6 G/DL
ALP SERPL-CCNC: 83 U/L (ref 39–117)
ALT SERPL W P-5'-P-CCNC: 16 U/L (ref 1–33)
ANION GAP SERPL CALCULATED.3IONS-SCNC: 7 MMOL/L (ref 5–15)
AST SERPL-CCNC: 21 U/L (ref 1–32)
BASOPHILS # BLD AUTO: 0.04 10*3/MM3 (ref 0–0.2)
BASOPHILS NFR BLD AUTO: 0.9 % (ref 0–1.5)
BILIRUB SERPL-MCNC: 0.2 MG/DL (ref 0–1.2)
BUN SERPL-MCNC: 16 MG/DL (ref 8–23)
BUN/CREAT SERPL: 23.2 (ref 7–25)
CALCIUM SPEC-SCNC: 9.7 MG/DL (ref 8.6–10.5)
CHLORIDE SERPL-SCNC: 104 MMOL/L (ref 98–107)
CHOLEST SERPL-MCNC: 209 MG/DL (ref 0–200)
CO2 SERPL-SCNC: 30 MMOL/L (ref 22–29)
CREAT SERPL-MCNC: 0.69 MG/DL (ref 0.57–1)
DEPRECATED RDW RBC AUTO: 41.6 FL (ref 37–54)
EOSINOPHIL # BLD AUTO: 0.19 10*3/MM3 (ref 0–0.4)
EOSINOPHIL NFR BLD AUTO: 4.1 % (ref 0.3–6.2)
ERYTHROCYTE [DISTWIDTH] IN BLOOD BY AUTOMATED COUNT: 12.7 % (ref 12.3–15.4)
FERRITIN SERPL-MCNC: 69.5 NG/ML (ref 13–150)
GFR SERPL CREATININE-BSD FRML MDRD: 84 ML/MIN/1.73
GLOBULIN UR ELPH-MCNC: 2.7 GM/DL
GLUCOSE SERPL-MCNC: 99 MG/DL (ref 65–99)
HCT VFR BLD AUTO: 41.8 % (ref 34–46.6)
HDLC SERPL-MCNC: 68 MG/DL (ref 40–60)
HGB BLD-MCNC: 13.6 G/DL (ref 12–15.9)
IMM GRANULOCYTES # BLD AUTO: 0.01 10*3/MM3 (ref 0–0.05)
IMM GRANULOCYTES NFR BLD AUTO: 0.2 % (ref 0–0.5)
IRON 24H UR-MRATE: 62 MCG/DL (ref 37–145)
IRON SATN MFR SERPL: 16 % (ref 20–50)
LDLC SERPL CALC-MCNC: 109 MG/DL (ref 0–100)
LDLC/HDLC SERPL: 1.52 {RATIO}
LYMPHOCYTES # BLD AUTO: 1.02 10*3/MM3 (ref 0.7–3.1)
LYMPHOCYTES NFR BLD AUTO: 21.8 % (ref 19.6–45.3)
MCH RBC QN AUTO: 29.4 PG (ref 26.6–33)
MCHC RBC AUTO-ENTMCNC: 32.5 G/DL (ref 31.5–35.7)
MCV RBC AUTO: 90.5 FL (ref 79–97)
MONOCYTES # BLD AUTO: 0.43 10*3/MM3 (ref 0.1–0.9)
MONOCYTES NFR BLD AUTO: 9.2 % (ref 5–12)
NEUTROPHILS NFR BLD AUTO: 2.99 10*3/MM3 (ref 1.7–7)
NEUTROPHILS NFR BLD AUTO: 63.8 % (ref 42.7–76)
NRBC BLD AUTO-RTO: 0 /100 WBC (ref 0–0.2)
PLATELET # BLD AUTO: 158 10*3/MM3 (ref 140–450)
PMV BLD AUTO: 12 FL (ref 6–12)
POTASSIUM SERPL-SCNC: 4.1 MMOL/L (ref 3.5–5.2)
PROT SERPL-MCNC: 7.1 G/DL (ref 6–8.5)
RBC # BLD AUTO: 4.62 10*6/MM3 (ref 3.77–5.28)
SODIUM SERPL-SCNC: 141 MMOL/L (ref 136–145)
T4 FREE SERPL-MCNC: 1.01 NG/DL (ref 0.93–1.7)
TIBC SERPL-MCNC: 398 MCG/DL (ref 298–536)
TRANSFERRIN SERPL-MCNC: 267 MG/DL (ref 200–360)
TRIGL SERPL-MCNC: 189 MG/DL (ref 0–150)
TSH SERPL DL<=0.05 MIU/L-ACNC: 3.42 UIU/ML (ref 0.27–4.2)
VLDLC SERPL-MCNC: 32 MG/DL (ref 5–40)
WBC NRBC COR # BLD: 4.68 10*3/MM3 (ref 3.4–10.8)

## 2022-02-14 PROCEDURE — 36415 COLL VENOUS BLD VENIPUNCTURE: CPT | Performed by: FAMILY MEDICINE

## 2022-02-14 PROCEDURE — 83540 ASSAY OF IRON: CPT | Performed by: FAMILY MEDICINE

## 2022-02-14 PROCEDURE — 82728 ASSAY OF FERRITIN: CPT | Performed by: FAMILY MEDICINE

## 2022-02-14 PROCEDURE — 80061 LIPID PANEL: CPT | Performed by: FAMILY MEDICINE

## 2022-02-14 PROCEDURE — 84466 ASSAY OF TRANSFERRIN: CPT | Performed by: FAMILY MEDICINE

## 2022-02-14 PROCEDURE — 84439 ASSAY OF FREE THYROXINE: CPT | Performed by: FAMILY MEDICINE

## 2022-02-14 PROCEDURE — 84443 ASSAY THYROID STIM HORMONE: CPT | Performed by: FAMILY MEDICINE

## 2022-02-14 PROCEDURE — 85025 COMPLETE CBC W/AUTO DIFF WBC: CPT | Performed by: FAMILY MEDICINE

## 2022-02-14 PROCEDURE — 80053 COMPREHEN METABOLIC PANEL: CPT | Performed by: FAMILY MEDICINE

## 2022-02-14 PROCEDURE — 99204 OFFICE O/P NEW MOD 45 MIN: CPT | Performed by: FAMILY MEDICINE

## 2022-02-14 RX ORDER — SUMATRIPTAN 50 MG/1
50 TABLET, FILM COATED ORAL ONCE AS NEEDED
Qty: 9 TABLET | Refills: 5 | Status: SHIPPED | OUTPATIENT
Start: 2022-02-14

## 2022-02-14 RX ORDER — NEBIVOLOL 2.5 MG/1
2.5 TABLET ORAL DAILY
Qty: 90 TABLET | Refills: 3 | Status: SHIPPED | OUTPATIENT
Start: 2022-02-14 | End: 2022-02-14

## 2022-02-14 RX ORDER — SUMATRIPTAN 50 MG/1
50 TABLET, FILM COATED ORAL ONCE AS NEEDED
Qty: 9 TABLET | Refills: 5 | Status: SHIPPED | OUTPATIENT
Start: 2022-02-14 | End: 2022-02-14

## 2022-02-14 RX ORDER — FAMOTIDINE 40 MG/1
40 TABLET, FILM COATED ORAL
Qty: 90 TABLET | Refills: 3 | Status: SHIPPED | OUTPATIENT
Start: 2022-02-14 | End: 2022-02-14

## 2022-02-14 RX ORDER — FAMOTIDINE 40 MG/1
40 TABLET, FILM COATED ORAL
Qty: 90 TABLET | Refills: 3 | Status: SHIPPED | OUTPATIENT
Start: 2022-02-14 | End: 2022-05-15

## 2022-02-14 RX ORDER — NEBIVOLOL 2.5 MG/1
2.5 TABLET ORAL DAILY
Qty: 90 TABLET | Refills: 3 | Status: SHIPPED | OUTPATIENT
Start: 2022-02-14 | End: 2022-09-06

## 2022-02-14 NOTE — ASSESSMENT & PLAN NOTE
She has a follow-up with neurology in March.  She states that her tremor with reducing her dosage is slightly worse.  Even before her next visit she may want to go back up to 1-1/2 tabs 3 times daily.

## 2022-02-14 NOTE — ASSESSMENT & PLAN NOTE
She is currently doing well.  She has a stent in her LAD.  She sees her cardiologist on a regular basis.

## 2022-02-14 NOTE — ASSESSMENT & PLAN NOTE
She did have a recent Holter monitor which showed a first-degree AV block.  She does have some intermittent pauses even when she checks her pulse.  Other than that she is asymptomatic.

## 2022-02-14 NOTE — PROGRESS NOTES
Chief Complaint   Patient presents with   • Establish Care     New Patient    • Med Refill        Subjective     Kristi Ibrahim  has a past medical history of Anemia, unspecified, Arthritis, Asthma, Chest pain, Fibromyalgia, GERD (gastroesophageal reflux disease), H/O degenerative disc disease, Hemangioma of bone (09/10/2019), Hepatitis A, Leg pain, Leg swelling, Leg weakness, bilateral (09/10/2019), Lumbar herniated disc, Lumbar stenosis (09/10/2019), Mitral valve insufficiency, Muscle cramps, Shortness of breath, Spondylolisthesis of cervical region (09/10/2019), Spondylolisthesis of lumbar region (08/25/2020), Tricuspid regurgitation, and Voice hoarseness.    Establish care-she had previously seen Dr. Reddy in [place] open to the time of his halfway.  She now needs to establish care with a new PCP.    Coronary artery disease-she has a stent in her LAD.  This was done in about 2012.  Her cardiologist is in [place].    Hypertension-she does check her blood pressure outside the office.  Is oftentimes very good and occasionally on the lower side.    Parkinson's disease-she currently sees a neurologist Dr. Conley up in [place].  She is currently on sentiment.  She states he is recently reduced it back to 1 tablet 3 times a day.  She was hoping that her tremor would improve but it is not.  She states is primarily in her left arm.    Migraine headaches-she states her migraines are unpredictable occasionally I will be years but she states in this past year they have been a little bit more frequent.  Typically she uses an Imitrex at onset of her aura.  Typically just a half a tablet because complete resolution.      PHQ-2 Depression Screening  Little interest or pleasure in doing things? 0   Feeling down, depressed, or hopeless? 0   PHQ-2 Total Score 0   PHQ-9 Depression Screening  Little interest or pleasure in doing things? 0   Feeling down, depressed, or hopeless? 0   Trouble falling or staying asleep, or  sleeping too much?     Feeling tired or having little energy?     Poor appetite or overeating?     Feeling bad about yourself - or that you are a failure or have let yourself or your family down?     Trouble concentrating on things, such as reading the newspaper or watching television?     Moving or speaking so slowly that other people could have noticed? Or the opposite - being so fidgety or restless that you have been moving around a lot more than usual?     Thoughts that you would be better off dead, or of hurting yourself in some way?     PHQ-9 Total Score 0   If you checked off any problems, how difficult have these problems made it for you to do your work, take care of things at home, or get along with other people?       Allergies   Allergen Reactions   • Nickel Rash   • Rapeseed [Brassica Napus Seed Oil] Hives     Had to go to ER for treatment.   • Ace Inhibitors Unknown (See Comments)     NOT  SURE   • Acetaminophen Swelling   • Brompheniramine Unknown (See Comments)     NOT SURE   • Codeine Nausea And Vomiting   • Codeine Sulfate    • Diclofenac Urinary Retention     GI PAIN - ULCERS???   • Flurbiprofen Unknown (See Comments)     NOT SURE   • Ibuprofen Unknown (See Comments)     NOT SURE   • Indocyanine Green Unknown (See Comments)     NOT SURE   • Pantoprazole Nausea And Vomiting   • Propoxyphene Unknown (See Comments)     NOT SURE   • Iodinated Diagnostic Agents Rash       Prior to Admission medications    Medication Sig Start Date End Date Taking? Authorizing Provider   ALBUTEROL IN Inhale 1 inhaler As Needed.   Yes Soo Dodd MD   baclofen (LIORESAL) 10 MG tablet Take 10 mg by mouth 3 (Three) Times a Day. 8/23/21  Yes Soo Dodd MD   carbidopa-levodopa (SINEMET)  MG per tablet Take 1.5 tablets by mouth 3 (Three) Times a Day. 7/13/20  Yes Soo Dodd MD   cetirizine (zyrTEC) 10 MG tablet Take 10 mg by mouth Daily.   Yes Soo Dodd MD   Cholecalciferol 10  MCG/ML liquid liquid Take 400 Units by mouth Daily.   Yes Soo Dodd MD   dimenhyDRINATE (DRAMAMINE) 50 MG tablet Take 50 mg by mouth At Night As Needed for movement disorders.   Yes Soo Dodd MD   famotidine (Pepcid) 40 MG tablet 40 mg.   Yes Soo Dodd MD   Ferrous Fumarate 325 (106 Fe) MG tablet Take 1 tablet by mouth.   Yes Soo Dodd MD   fluticasone (Flovent HFA) 110 MCG/ACT inhaler    Yes Soo Dodd MD   lidocaine (LIDODERM) 5 %  11/10/21  Yes Soo Dodd MD   magnesium oxide (MAG-OX) 400 MG tablet Take 400 mg by mouth 2 (Two) Times a Day.   Yes Soo Dodd MD   meclizine (ANTIVERT) 25 MG tablet Take 25 mg by mouth daily. 3/14/16  Yes Soo Dodd MD   Multiple Vitamins-Minerals (HAIR SKIN AND NAILS FORMULA PO) Take 1 tablet by mouth Daily.   Yes Soo Dodd MD   nebivolol (Bystolic) 2.5 MG tablet Take 1 tablet by mouth Daily. 7/13/21  Yes Malina Breen MD   nitroglycerin (NITROSTAT) 0.4 MG SL tablet Place 1 tablet under the tongue Every 5 (Five) Minutes As Needed for Chest Pain. 5/18/17  Yes Malina Breen MD   ondansetron (ZOFRAN) 4 MG tablet ondansetron HCl 4 mg tablet   Yes Soo Dodd MD   oxyCODONE-acetaminophen (PERCOCET) 7.5-325 MG per tablet Take 1 tablet by mouth 2 (Two) Times a Day. 12/9/21  Yes Soo Dodd MD   Probiotic Product (SOLUBLE FIBER/PROBIOTICS PO) Take 1 tablet by mouth Daily.   Yes Soo Dodd MD   SUMAtriptan (IMITREX) 100 MG tablet sumatriptan 100 mg tablet   Yes Soo Dodd MD   triamcinolone (KENALOG) 0.1 % cream triamcinolone acetonide 0.1 % topical cream   Yes Soo Dodd MD   vitamin E 400 UNIT capsule Take 400 Units by mouth Daily. 3/10/14  Yes Soo Dodd MD   aspirin 81 MG tablet Take 81 mg by mouth Daily. 3/10/14   Soo Dodd MD   traMADol (ULTRAM) 50 MG tablet 50 mg.    Soo Dodd MD    Narcan 4 MG/0.1ML nasal spray ADMINISTER A SINGLE SPRAY INTRANASALLY INTO ONE NOSTRIL. CALL 911. MAY REPEAT X1. 12/10/21 2/14/22  ProviderSoo MD   promethazine (PHENERGAN) 25 MG tablet  11/15/21 2/14/22  Provider, MD Soo        Patient Active Problem List   Diagnosis   • Abnormal cardiovascular stress test   • Benign essential hypertension   • Coronary arteriosclerosis in native artery   • Presence of stent in coronary artery   • SSS (sick sinus syndrome) ([facility])   • Palpitations   • Anemia, unspecified   • Arthritis   • Asthma   • Degeneration of lumbar intervertebral disc   • Left lumbar radiculopathy   • Lumbar spondylosis   • Dizziness   • Dyskinesia   • Dystonia   • GERD (gastroesophageal reflux disease)   • Hemangioma   • Hepatitis A   • Leg pain   • Leg swelling   • Leg weakness, bilateral   • Long-term current use of opiate analgesic   • Muscle cramps   • Parkinson disease ([facility])   • Seasonal allergic rhinitis   • Shortness of breath   • Spondylolisthesis of cervical region   • Migraine with aura and without status migrainosus, not intractable   • Postmenopausal        Past Surgical History:   Procedure Laterality Date   • CARDIAC CATHETERIZATION     • CARDIAC CATHETERIZATION N/A 4/23/2018    Procedure: Left Heart Cath;  Surgeon: Malina Breen MD;  Location:  [facility];  Service: Cardiovascular   • CARDIAC CATHETERIZATION N/A 4/23/2018    Procedure: Coronary angiography;  Surgeon: Malina Breen MD;  Location:  [facility];  Service: Cardiovascular   • CARDIAC CATHETERIZATION N/A 4/23/2018    Procedure: Left ventriculography;  Surgeon: Malina Breen MD;  Location:  [facility];  Service: Cardiovascular   • CARPAL TUNNEL RELEASE Bilateral    • CHOLECYSTECTOMY     • COLONOSCOPY     • CORONARY ANGIOPLASTY     • FOOT SURGERY     • HAND SURGERY     • KNEE ARTHROSCOPY Left    • OTHER SURGICAL HISTORY      JOINT SURGERY   •  "RIB BIOPSY, RIB RESECTION     • SPINAL FUSION     • TONSILLECTOMY AND ADENOIDECTOMY     • TUBAL ABDOMINAL LIGATION         Social History     Socioeconomic History   • Marital status:    Tobacco Use   • Smoking status: Never Smoker   • Smokeless tobacco: Never Used   Vaping Use   • Vaping Use: Never used   Substance and Sexual Activity   • Alcohol use: Never   • Drug use: Never   • Sexual activity: Defer     Birth control/protection: Post-menopausal       Family History   Problem Relation Age of Onset   • Heart disease Mother    • Stroke Mother    • Arthritis Mother    • Heart disease Father    • Hypertension Father    • Arthritis Father    • Stomach cancer Father    • Heart disease Brother    • Hypertension Brother    • Arthritis Brother    • Breast cancer Sister    • Arthritis Sister    • Cancer Maternal Grandmother         HISTORY OF MALIGNANT NEOPLASM OF UPPER LOBE BRONCHUS OR LUNG        Family history, surgical history, past medical history, Allergies and med's reviewed with patient today and updated in [facility] EMR.     ROS:  Review of Systems   Constitutional: Positive for fatigue.   HENT: Positive for congestion and postnasal drip. Negative for rhinorrhea.    Eyes: Negative for blurred vision and visual disturbance.   Respiratory: Positive for cough, shortness of breath and wheezing. Negative for chest tightness.    Cardiovascular: Positive for palpitations. Negative for chest pain.   Gastrointestinal: Negative for abdominal pain, constipation and diarrhea.   Musculoskeletal: Positive for back pain.   Neurological: Positive for headache.   Psychiatric/Behavioral: Negative for depressed mood. The patient is not nervous/anxious.        OBJECTIVE:  Vitals:    02/14/22 1403   BP: 134/53   Pulse: (!) 46   Resp: 17   Temp: 97.6 °F (36.4 °C)   TempSrc: Temporal   SpO2: 98%   Weight: 81.6 kg (180 lb)   Height: 161.3 cm (63.5\")     No exam data present   Body mass index is 31.39 kg/m².  No LMP " recorded.    Physical Exam  Vitals and nursing note reviewed.   Constitutional:       General: She is not in acute distress.     Appearance: Normal appearance. She is obese.   HENT:      Head: Normocephalic.      Right Ear: Tympanic membrane, ear canal and external ear normal.      Left Ear: Tympanic membrane, ear canal and external ear normal.      Nose: Nose normal.      Mouth/Throat:      Mouth: Mucous membranes are moist.      Pharynx: Oropharynx is clear.   Eyes:      General: No scleral icterus.     Conjunctiva/sclera: Conjunctivae normal.      Pupils: Pupils are equal, round, and reactive to light.   Cardiovascular:      Rate and Rhythm: Normal rate and regular rhythm.      Pulses: Normal pulses.      Heart sounds: Normal heart sounds. No murmur heard.      Pulmonary:      Effort: Pulmonary effort is normal.      Breath sounds: Normal breath sounds. No wheezing, rhonchi or rales.   Musculoskeletal:      Cervical back: No rigidity or tenderness.   Lymphadenopathy:      Cervical: No cervical adenopathy.   Skin:     General: Skin is warm and dry.      Coloration: Skin is not jaundiced.      Findings: No rash.   Neurological:      General: No focal deficit present.      Mental Status: She is alert and oriented to person, place, and time.      Motor: No tremor or abnormal muscle tone.   Psychiatric:         Mood and Affect: Mood normal.         Thought Content: Thought content normal.         Judgment: Judgment normal.         Procedures    No visits with results within 30 Day(s) from this visit.   Latest known visit with results is:   Office Visit on 10/01/2021   Component Date Value Ref Range Status   • Target HR (85%) 10/14/2021 127  bpm Final   • Max. Pred. HR (100%) 10/14/2021 149  bpm Final   • BH CV REST NUCLEAR ISOTOPE DOSE 10/14/2021 10.9  mCi Final   • BH CV STRESS NUCLEAR ISOTOPE DOSE 10/14/2021 32.1  mCi Final   •  CV STRESS PROTOCOL 1 10/14/2021 Pharmacologic   Final   • Stage 1 10/14/2021 1   Final    • HR Stage 1 10/14/2021 91   Final   • BP Stage 1 10/14/2021 113/80   Final   • O2 Stage 1 10/14/2021 98   Final   • Duration Min Stage 1 10/14/2021 1   Final   • Duration Sec Stage 1 10/14/2021 0   Final   • Stress Dose Regadenoson Stage 1 10/14/2021 0.4   Final   • Stress Comments Stage 1 10/14/2021 10 sec bolus injection   Final   • Baseline HR 10/14/2021 74  bpm Final   • Baseline BP 10/14/2021 104/64  mmHg Final   • O2 sat rest 10/14/2021 96  % Final   • Peak HR 10/14/2021 91  bpm Final   • Percent Max Pred HR 10/14/2021 61.07  % Final   • Percent Target HR 10/14/2021 72  % Final   • Peak BP 10/14/2021 113/80  mmHg Final   • O2 sat peak 10/14/2021 98  % Final   • Recovery HR 10/14/2021 97  bpm Final   • Recovery BP 10/14/2021 117/74  mmHg Final   • Recovery O2 10/14/2021 96  % Final   • Exercise duration (min) 10/14/2021 1  min Final   • Exercise duration (sec) 10/14/2021 0  sec Final   • Estimated workload 10/14/2021 1.0  METS Final   • Im Post Recovery HR 10/14/2021 91  BPM Final   • Im Post BP 10/14/2021 104/65  mmHg Final   • Im Post O2 Sats 10/14/2021 97  % Final   • 3 Min Post Recovery HR 10/14/2021 95  bpm Final   • 3 Min Post BP 10/14/2021 119/78  mmHg Final   • 3 Minute Recovery O2 Sat 10/14/2021 96  % Final   • Nuc Stress EF 10/14/2021 60  % Final   • BSA 10/14/2021 1.8  m^2 Final   • IVSd 10/14/2021 0.78  cm Final   • LVIDd 10/14/2021 4.2  cm Final   • LVIDs 10/14/2021 2.7  cm Final   • LVPWd 10/14/2021 0.85  cm Final   • IVS/LVPW 10/14/2021 0.92   Final   • FS 10/14/2021 36.1  % Final   • EDV(Teich) 10/14/2021 80.0  ml Final   • ESV(Teich) 10/14/2021 27.1  ml Final   • EF(Teich) 10/14/2021 66.1  % Final   • EDV(cubed) 10/14/2021 75.8  ml Final   • ESV(cubed) 10/14/2021 19.8  ml Final   • EF(cubed) 10/14/2021 73.9  % Final   • LV mass(C)d 10/14/2021 104.5  grams Final   • LV mass(C)dI 10/14/2021 57.5  grams/m^2 Final   • SV(Teich) 10/14/2021 52.9  ml Final   • SI(Teich) 10/14/2021 29.1  ml/m^2  Final   • SV(cubed) 10/14/2021 56.0  ml Final   • SI(cubed) 10/14/2021 30.8  ml/m^2 Final   • Ao root diam 10/14/2021 2.8  cm Final   • Ao root area 10/14/2021 6.2  cm^2 Final   • ACS 10/14/2021 1.3  cm Final   • Ao Arch Diam (Proximal trans.) 10/14/2021 2.7  cm Final   • LVOT diam 10/14/2021 2.0  cm Final   • LVOT area 10/14/2021 3.0  cm^2 Final   • LVOT area(traced) 10/14/2021 3.1  cm^2 Final   • LVLd ap4 10/14/2021 6.4  cm Final   • EDV(MOD-sp4) 10/14/2021 66.0  ml Final   • LVLs ap4 10/14/2021 5.4  cm Final   • ESV(MOD-sp4) 10/14/2021 25.0  ml Final   • EF(MOD-sp4) 10/14/2021 62.1  % Final   • LVLd ap2 10/14/2021 6.7  cm Final   • EDV(MOD-sp2) 10/14/2021 62.0  ml Final   • LVLs ap2 10/14/2021 5.8  cm Final   • ESV(MOD-sp2) 10/14/2021 31.0  ml Final   • EF(MOD-sp2) 10/14/2021 50.0  % Final   • SV(MOD-sp4) 10/14/2021 41.0  ml Final   • SI(MOD-sp4) 10/14/2021 22.5  ml/m^2 Final   • SV(MOD-sp2) 10/14/2021 31.0  ml Final   • SI(MOD-sp2) 10/14/2021 17.0  ml/m^2 Final   • Ao root area (BSA corrected) 10/14/2021 1.5   Final   • LV Long Vol (BSA corrected) 10/14/2021 36.3  ml/m^2 Final   • LV Sys Vol (BSA corrected) 10/14/2021 13.7  ml/m^2 Final   • TAPSE (>1.6) 10/14/2021 2.3  cm Final   • EF(MOD-bp) 10/14/2021 55.8  % Final   • MV A dur 10/14/2021 0.11  sec Final   • MV E max sophia 10/14/2021 63.5  cm/sec Final   • MV A max sophia 10/14/2021 87.3  cm/sec Final   • MV E/A 10/14/2021 0.73   Final   • MV V2 max 10/14/2021 95.2  cm/sec Final   • MV max PG 10/14/2021 3.6  mmHg Final   • MV V2 mean 10/14/2021 59.8  cm/sec Final   • MV mean PG 10/14/2021 1.6  mmHg Final   • MV V2 VTI 10/14/2021 23.7  cm Final   • MVA(VTI) 10/14/2021 2.8  cm^2 Final   • MV P1/2t max sophia 10/14/2021 95.2  cm/sec Final   • MV P1/2t 10/14/2021 73.7  msec Final   • MVA(P1/2t) 10/14/2021 3.0  cm^2 Final   • MV dec slope 10/14/2021 378.2  cm/sec^2 Final   • MV dec time 10/14/2021 235  sec Final   • Ao pk sophia 10/14/2021 136.8  cm/sec Final   • Ao max PG  10/14/2021 7.5  mmHg Final   • Ao max PG (full) 10/14/2021 3.3  mmHg Final   • Ao V2 mean 10/14/2021 93.2  cm/sec Final   • Ao mean PG 10/14/2021 3.9  mmHg Final   • Ao mean PG (full) 10/14/2021 1.8  mmHg Final   • Ao V2 VTI 10/14/2021 28.3  cm Final   • [place](I,A) 10/14/2021 2.3  cm^2 Final   • [place](I,D) 10/14/2021 2.3  cm^2 Final   • [place](V,A) 10/14/2021 2.3  cm^2 Final   • [place](V,D) 10/14/2021 2.3  cm^2 Final   • LV V1 max PG 10/14/2021 4.2  mmHg Final   • LV V1 mean PG 10/14/2021 2.2  mmHg Final   • LV V1 max 10/14/2021 102.3  cm/sec Final   • LV V1 mean 10/14/2021 68.1  cm/sec Final   • LV V1 VTI 10/14/2021 21.9  cm Final   • MR max walt 10/14/2021 480.0  cm/sec Final   • MR max PG 10/14/2021 92.1  mmHg Final   • SV(Ao) 10/14/2021 174.8  ml Final   • SI(Ao) 10/14/2021 96.1  ml/m^2 Final   • SV(LVOT) 10/14/2021 66.5  ml Final   • SI(LVOT) 10/14/2021 36.6  ml/m^2 Final   • PA V2 max 10/14/2021 86.9  cm/sec Final   • PA max PG 10/14/2021 3.0  mmHg Final   • PA max PG (full) 10/14/2021 1.1  mmHg Final   • PA acc time 10/14/2021 0.03  sec Final   • PI end-d walt 10/14/2021 112.0  cm/sec Final   • RV V1 max PG 10/14/2021 1.9  mmHg Final   • RV V1 mean PG 10/14/2021 1.1  mmHg Final   • RV V1 max 10/14/2021 68.4  cm/sec Final   • RV V1 mean 10/14/2021 48.0  cm/sec Final   • RV V1 VTI 10/14/2021 14.0  cm Final   • TR max walt 10/14/2021 230.8  cm/sec Final   • PA pr(Accel) 10/14/2021 63.6  mmHg Final   • Pulm Sys Walt 10/14/2021 78.6  cm/sec Final   • Pulm Long Walt 10/14/2021 50.0  cm/sec Final   • Pulm S/D 10/14/2021 1.6   Final   • Pulm A Revs Dur 10/14/2021 0.12  sec Final   • Pulm A Revs Walt 10/14/2021 32.5  cm/sec Final   • MVA P1/2T LCG 10/14/2021 2.3  cm^2 Final   • Lat Peak E' Walt 10/14/2021 7.2  cm/sec Final   • Med Peak E' Walt 10/14/2021 6.0  cm/sec Final   • RV S' 10/14/2021 10.4  cm/sec Final   • BH CV ECHO JANUSZ - BZI_BMI 10/14/2021 30.6  kilograms/m^2 Final   • BH CV ECHO JANUSZ - BSA([place]) 10/14/2021 1.9  m^2  Final   • BH CV ECHO JANUSZ - BZI_METRIC_WEIGHT 10/14/2021 78.5  kg Final   • BH CV ECHO JANUSZ - BZI_METRIC_HEIGHT 10/14/2021 160.0  cm Final   • Avg E/e' ratio 10/14/2021 9.62   Final   • Target HR (85%) 10/14/2021 127  bpm Final   • Max. Pred. HR (100%) 10/14/2021 149  bpm Final   • Sinus 10/14/2021 2.6  cm Final   • Aortic arch 10/14/2021 2.7  cm Final   • Dimensionless Index 10/14/2021 0.80  (DI) Final   • LA Volume Index 10/14/2021 20.7  mL/m2 Final   • RAP systole 10/14/2021 3  mmHg Final   • RVSP(TR) 10/14/2021 24  mmHg Final   • TR max PG 10/14/2021 21  mmHg Final       ASSESSMENT/ PLAN:    Diagnoses and all orders for this visit:    1. Benign essential hypertension (Primary)  Assessment & Plan:  Her blood pressure is good here as well as outside the office.  We will continue her current meds and she will continue to monitor at home.    Orders:  -     Comprehensive Metabolic Panel  -     Lipid Panel  -     TSH+Free T4    2. Coronary arteriosclerosis in native artery  Assessment & Plan:  She is currently doing well.  She has a stent in her LAD.  She sees her cardiologist on a regular basis.    Orders:  -     Lipid Panel  -     TSH+Free T4    3. Mild intermittent asthma, unspecified whether complicated  -     TSH+Free T4    4. Migraine with aura and without status migrainosus, not intractable  -     TSH+Free T4    5. Parkinson disease ([facility])  Assessment & Plan:  She has a follow-up with neurology in March.  She states that her tremor with reducing her dosage is slightly worse.  Even before her next visit she may want to go back up to 1-1/2 tabs 3 times daily.    Orders:  -     TSH+Free T4    6. SSS (sick sinus syndrome) ([facility])  Assessment & Plan:  She did have a recent Holter monitor which showed a first-degree AV block.  She does have some intermittent pauses even when she checks her pulse.  Other than that she is asymptomatic.    Orders:  -     TSH+Free T4    7. Iron deficiency anemia, unspecified iron deficiency  anemia type  -     TSH+Free T4  -     CBC Auto Differential  -     Ferritin  -     Iron Profile    8. Postmenopausal  Assessment & Plan:  She is past due for her DEXA scan we will get that updated.    Orders:  -     TSH+Free T4  -     DEXA Bone Density Axial; Future    Other orders  -     Discontinue: SUMAtriptan (IMITREX) 50 MG tablet; Take 1 tablet by mouth 1 (One) Time As Needed for Migraine for up to 1 dose. Take one tablet at onset of headache. May repeat dose one time in 2 hours if headache not relieved.  Dispense: 9 tablet; Refill: 5  -     Discontinue: Ferrous Fumarate 325 (106 Fe) MG tablet; Take 1 tablet by mouth Daily for 90 days.  Dispense: 90 each; Refill: 3  -     Discontinue: famotidine (Pepcid) 40 MG tablet; Take 1 tablet by mouth every night at bedtime for 90 days.  Dispense: 90 tablet; Refill: 3  -     Discontinue: nebivolol (Bystolic) 2.5 MG tablet; Take 1 tablet by mouth Daily for 90 days.  Dispense: 90 tablet; Refill: 3  -     famotidine (Pepcid) 40 MG tablet; Take 1 tablet by mouth every night at bedtime for 90 days.  Dispense: 90 tablet; Refill: 3  -     Ferrous Fumarate 325 (106 Fe) MG tablet; Take 1 tablet by mouth Daily for 90 days.  Dispense: 90 each; Refill: 3  -     nebivolol (Bystolic) 2.5 MG tablet; Take 1 tablet by mouth Daily for 90 days.  Dispense: 90 tablet; Refill: 3  -     SUMAtriptan (IMITREX) 50 MG tablet; Take 1 tablet by mouth 1 (One) Time As Needed for Migraine for up to 1 dose. Take one tablet at onset of headache. May repeat dose one time in 2 hours if headache not relieved.  Dispense: 9 tablet; Refill: 5      Orders Placed Today:     New Medications Ordered This Visit   Medications   • famotidine (Pepcid) 40 MG tablet     Sig: Take 1 tablet by mouth every night at bedtime for 90 days.     Dispense:  90 tablet     Refill:  3   • Ferrous Fumarate 325 (106 Fe) MG tablet     Sig: Take 1 tablet by mouth Daily for 90 days.     Dispense:  90 each     Refill:  3   • nebivolol  (Bystolic) 2.5 MG tablet     Sig: Take 1 tablet by mouth Daily for 90 days.     Dispense:  90 tablet     Refill:  3   • SUMAtriptan (IMITREX) 50 MG tablet     Sig: Take 1 tablet by mouth 1 (One) Time As Needed for Migraine for up to 1 dose. Take one tablet at onset of headache. May repeat dose one time in 2 hours if headache not relieved.     Dispense:  9 tablet     Refill:  5        Management Plan:     An After Visit Summary was printed and given to the patient at discharge.    Follow-up: Return in about 6 months (around 8/14/2022) for Recheck.    Kamron Taylor,  2/14/2022 15:16 EST  This note was electronically signed.

## 2022-02-14 NOTE — ASSESSMENT & PLAN NOTE
Her blood pressure is good here as well as outside the office.  We will continue her current meds and she will continue to monitor at home.

## 2022-02-18 ENCOUNTER — PATIENT ROUNDING (BHMG ONLY) (OUTPATIENT)
Dept: FAMILY MEDICINE CLINIC | Facility: CLINIC | Age: 72
End: 2022-02-18

## 2022-02-18 NOTE — PROGRESS NOTES
A [facility] MESSAGE HAS BEEN SENT TO THE PATIENT FOR [place] [facility] [place]

## 2022-03-26 ENCOUNTER — HOSPITAL ENCOUNTER (OUTPATIENT)
Dept: BONE DENSITY | Facility: HOSPITAL | Age: 72
Discharge: HOME | End: 2022-03-26
Admitting: FAMILY MEDICINE

## 2022-03-26 DIAGNOSIS — Z78.0 POSTMENOPAUSAL: ICD-10-CM

## 2022-03-26 PROCEDURE — 77080 DXA BONE DENSITY AXIAL: CPT

## 2022-03-31 ENCOUNTER — OFFICE VISIT (OUTPATIENT)
Dept: PULMONOLOGY | Facility: CLINIC | Age: 72
End: 2022-03-31

## 2022-03-31 VITALS
SYSTOLIC BLOOD PRESSURE: 108 MMHG | TEMPERATURE: 98 F | HEART RATE: 41 BPM | HEIGHT: 64 IN | WEIGHT: 180 LBS | RESPIRATION RATE: 16 BRPM | BODY MASS INDEX: 30.73 KG/M2 | DIASTOLIC BLOOD PRESSURE: 70 MMHG | OXYGEN SATURATION: 97 %

## 2022-03-31 DIAGNOSIS — J45.20 MILD INTERMITTENT ASTHMA, UNSPECIFIED WHETHER COMPLICATED: Primary | ICD-10-CM

## 2022-03-31 DIAGNOSIS — J01.00 ACUTE NON-RECURRENT MAXILLARY SINUSITIS: ICD-10-CM

## 2022-03-31 PROBLEM — J32.9 SINUS INFECTION: Status: ACTIVE | Noted: 2022-03-31

## 2022-03-31 PROCEDURE — 99213 OFFICE O/P EST LOW 20 MIN: CPT | Performed by: INTERNAL MEDICINE

## 2022-03-31 RX ORDER — FLUTICASONE PROPIONATE 110 UG/1
2 AEROSOL, METERED RESPIRATORY (INHALATION)
Qty: 12 G | Refills: 11 | Status: SHIPPED | OUTPATIENT
Start: 2022-03-31

## 2022-03-31 RX ORDER — ALBUTEROL SULFATE 90 UG/1
2 AEROSOL, METERED RESPIRATORY (INHALATION) EVERY 4 HOURS PRN
Qty: 18 G | Refills: 11 | Status: SHIPPED | OUTPATIENT
Start: 2022-03-31

## 2022-03-31 RX ORDER — AMOXICILLIN 875 MG/1
875 TABLET, COATED ORAL 2 TIMES DAILY
Qty: 14 TABLET | Refills: 0 | Status: SHIPPED | OUTPATIENT
Start: 2022-03-31 | End: 2022-05-19

## 2022-03-31 NOTE — ASSESSMENT & PLAN NOTE
Asthma is appears to be seasonal at this time    Continue as needed albuterol    Prescription given for Flovent    She likely needs the medications during the spring season with the blooming    Continue Zyrtec    Patient does have sinus infection we will treat with amoxicillin

## 2022-03-31 NOTE — PROGRESS NOTES
"Chief Complaint  Follow-up (6 mo f/u/Pt believes she has poss bronchitis or sinus infection  ), Asthma, and Cough    Subjective          Kristi Ibrahim presents to [facility]  History of Present Illness  71-year-old female with seasonal asthma here for follow-up  She has been having some sinus pressure with some cough and some drainage  Drainage thick at times  No fevers  No chest pain  Having some coughing feels some her drainage  Objective   Vital Signs:   /70 (BP Location: Left arm, Patient Position: Sitting, Cuff Size: Adult)   Pulse (!) 41   Temp 98 °F (36.7 °C)   Resp 16   Ht 161.3 cm (63.5\")   Wt 81.6 kg (180 lb)   SpO2 97%   BMI 31.39 kg/m²            Physical Exam   General Pleasant female  Neuro alert and oriented person place time and date cranial nerves II to XII grossly intact  Lungs clear to auscultation  Sinuses some pressure around the maxillary sinus on the right    Result Review :                 Assessment and Plan    Diagnoses and all orders for this visit:    1. Mild intermittent asthma, unspecified whether complicated (Primary)  Assessment & Plan:  Asthma is appears to be seasonal at this time    Continue as needed albuterol    Prescription given for Flovent    She likely needs the medications during the spring season with the blooming    Continue Zyrtec    Patient does have sinus infection we will treat with amoxicillin      2. Acute non-recurrent maxillary sinusitis  Assessment & Plan:  We will write for 7 days of amoxicillin      Other orders  -     amoxicillin (AMOXIL) 875 MG tablet; Take 1 tablet by mouth 2 (Two) Times a Day.  Dispense: 14 tablet; Refill: 0  -     fluticasone (FLOVENT HFA) 110 MCG/ACT inhaler; Inhale 2 puffs 2 (Two) Times a Day.  Dispense: 12 g; Refill: 11  -     albuterol sulfate  (90 Base) MCG/ACT inhaler; Inhale 2 puffs Every 4 (Four) Hours As Needed for Wheezing.  Dispense: 18 g; Refill: " 11      Follow Up   Return in about 1 year (around 3/31/2023).  Patient was given instructions and counseling regarding her condition or for health maintenance advice. Please see specific information pulled into the AVS if appropriate.

## 2022-05-18 ENCOUNTER — TELEPHONE (OUTPATIENT)
Dept: FAMILY MEDICINE CLINIC | Facility: CLINIC | Age: 72
End: 2022-05-18

## 2022-05-18 NOTE — TELEPHONE ENCOUNTER
Mrs. Ibrahim came to the window asking for a nurse to look at her leg. She states she is having a rash with some swelling. I advised since you was not in office that our MA could not look, but I told her I would send you a message to see you we could work her in tomorrow afternoon. You are at cap with 8, we can call her if you want to add her in.

## 2022-05-19 ENCOUNTER — HOSPITAL ENCOUNTER (OUTPATIENT)
Dept: CARDIOLOGY | Facility: HOSPITAL | Age: 72
Discharge: HOME | End: 2022-05-19
Admitting: FAMILY MEDICINE

## 2022-05-19 ENCOUNTER — OFFICE VISIT (OUTPATIENT)
Dept: FAMILY MEDICINE CLINIC | Facility: CLINIC | Age: 72
End: 2022-05-19

## 2022-05-19 VITALS
BODY MASS INDEX: 29.53 KG/M2 | SYSTOLIC BLOOD PRESSURE: 140 MMHG | HEIGHT: 64 IN | TEMPERATURE: 98.6 F | WEIGHT: 173 LBS | HEART RATE: 42 BPM | DIASTOLIC BLOOD PRESSURE: 63 MMHG | OXYGEN SATURATION: 98 %

## 2022-05-19 DIAGNOSIS — R60.0 LOCALIZED EDEMA: Primary | ICD-10-CM

## 2022-05-19 DIAGNOSIS — R60.0 LOCALIZED EDEMA: ICD-10-CM

## 2022-05-19 LAB
BH CV LOWER VASCULAR LEFT COMMON FEMORAL AUGMENT: NORMAL
BH CV LOWER VASCULAR LEFT COMMON FEMORAL COMPRESS: NORMAL
BH CV LOWER VASCULAR LEFT COMMON FEMORAL PHASIC: NORMAL
BH CV LOWER VASCULAR LEFT COMMON FEMORAL SPONT: NORMAL
BH CV LOWER VASCULAR LEFT DISTAL FEMORAL AUGMENT: NORMAL
BH CV LOWER VASCULAR LEFT DISTAL FEMORAL COMPETENT: NORMAL
BH CV LOWER VASCULAR LEFT DISTAL FEMORAL COMPRESS: NORMAL
BH CV LOWER VASCULAR LEFT DISTAL FEMORAL PHASIC: NORMAL
BH CV LOWER VASCULAR LEFT DISTAL FEMORAL SPONT: NORMAL
BH CV LOWER VASCULAR LEFT GREATER SAPH AK COMPRESS: NORMAL
BH CV LOWER VASCULAR LEFT MID FEMORAL COMPRESS: NORMAL
BH CV LOWER VASCULAR LEFT PERONEAL COMPRESS: NORMAL
BH CV LOWER VASCULAR LEFT POPLITEAL AUGMENT: NORMAL
BH CV LOWER VASCULAR LEFT POPLITEAL COMPETENT: NORMAL
BH CV LOWER VASCULAR LEFT POPLITEAL COMPRESS: NORMAL
BH CV LOWER VASCULAR LEFT POPLITEAL PHASIC: NORMAL
BH CV LOWER VASCULAR LEFT POPLITEAL SPONT: NORMAL
BH CV LOWER VASCULAR LEFT POSTERIOR TIBIAL AUGMENT: NORMAL
BH CV LOWER VASCULAR LEFT POSTERIOR TIBIAL COMPRESS: NORMAL
BH CV LOWER VASCULAR LEFT PROXIMAL FEMORAL COMPRESS: NORMAL
BH CV LOWER VASCULAR RIGHT COMMON FEMORAL AUGMENT: NORMAL
BH CV LOWER VASCULAR RIGHT COMMON FEMORAL COMPRESS: NORMAL
BH CV LOWER VASCULAR RIGHT COMMON FEMORAL PHASIC: NORMAL
BH CV LOWER VASCULAR RIGHT COMMON FEMORAL SPONT: NORMAL
BH CV LOWER VASCULAR RIGHT DISTAL FEMORAL AUGMENT: NORMAL
BH CV LOWER VASCULAR RIGHT DISTAL FEMORAL COMPETENT: NORMAL
BH CV LOWER VASCULAR RIGHT DISTAL FEMORAL COMPRESS: NORMAL
BH CV LOWER VASCULAR RIGHT DISTAL FEMORAL PHASIC: NORMAL
BH CV LOWER VASCULAR RIGHT DISTAL FEMORAL SPONT: NORMAL
BH CV LOWER VASCULAR RIGHT GASTRONEMIUS COMPRESS: NORMAL
BH CV LOWER VASCULAR RIGHT GREATER SAPH AK COMPRESS: NORMAL
BH CV LOWER VASCULAR RIGHT LESSER SAPH COMPRESS: NORMAL
BH CV LOWER VASCULAR RIGHT MID FEMORAL COMPRESS: NORMAL
BH CV LOWER VASCULAR RIGHT PERONEAL COMPRESS: NORMAL
BH CV LOWER VASCULAR RIGHT POPLITEAL AUGMENT: NORMAL
BH CV LOWER VASCULAR RIGHT POPLITEAL COMPETENT: NORMAL
BH CV LOWER VASCULAR RIGHT POPLITEAL COMPRESS: NORMAL
BH CV LOWER VASCULAR RIGHT POPLITEAL PHASIC: NORMAL
BH CV LOWER VASCULAR RIGHT POPLITEAL SPONT: NORMAL
BH CV LOWER VASCULAR RIGHT POSTERIOR TIBIAL AUGMENT: NORMAL
BH CV LOWER VASCULAR RIGHT POSTERIOR TIBIAL COMPRESS: NORMAL
BH CV LOWER VASCULAR RIGHT PROXIMAL FEMORAL COMPRESS: NORMAL
MAXIMAL PREDICTED HEART RATE: 149 BPM
STRESS TARGET HR: 127 BPM

## 2022-05-19 PROCEDURE — 93970 EXTREMITY STUDY: CPT

## 2022-05-19 PROCEDURE — 99213 OFFICE O/P EST LOW 20 MIN: CPT | Performed by: FAMILY MEDICINE

## 2022-05-19 PROCEDURE — 93970 EXTREMITY STUDY: CPT | Performed by: SURGERY

## 2022-05-19 NOTE — ASSESSMENT & PLAN NOTE
She has some swelling of both lower legs more so on the right than the left.  This was preceded by a long trip.  This may be his be more to some dependency, but will rule out DVT.

## 2022-05-19 NOTE — PROGRESS NOTES
Chief Complaint   Patient presents with   • leg swelling with a rash, right side        Subjective     Kristi Ibrahim  has a past medical history of Anemia, unspecified, Arthritis, Asthma, Chest pain, Coronary artery disease, Fibromyalgia, GERD (gastroesophageal reflux disease), H/O degenerative disc disease, Hemangioma of bone (09/10/2019), Hepatitis A, Leg pain, Leg swelling, Leg weakness, bilateral (09/10/2019), Lumbar herniated disc, Lumbar stenosis (09/10/2019), Mitral valve insufficiency, Muscle cramps, Shortness of breath, Sinus infection (3/31/2022), Spondylolisthesis of cervical region (09/10/2019), Spondylolisthesis of lumbar region (08/25/2020), Tricuspid regurgitation, and Voice hoarseness.    Leg swelling- she has had swelling of both lower legs, but more so on the right than the left.  This is been ongoing now for about 5 days.  She was recently on a trip to [place] in which she did fly, but she states she really did not have a lot of swelling there or on her way back until she returned.  She denies any wheezing or shortness of breath but does have a chronic cough.  She does have some discomfort in her legs with prolonged weightbearing but not otherwise.  And she does have a rash on her lower legs.      PHQ-2 Depression Screening  Little interest or pleasure in doing things?     Feeling down, depressed, or hopeless?     PHQ-2 Total Score     PHQ-9 Depression Screening  Little interest or pleasure in doing things?     Feeling down, depressed, or hopeless?     Trouble falling or staying asleep, or sleeping too much?     Feeling tired or having little energy?     Poor appetite or overeating?     Feeling bad about yourself - or that you are a failure or have let yourself or your family down?     Trouble concentrating on things, such as reading the newspaper or watching television?     Moving or speaking so slowly that other people could have noticed? Or the opposite - being so fidgety or restless that you  have been moving around a lot more than usual?     Thoughts that you would be better off dead, or of hurting yourself in some way?     PHQ-9 Total Score     If you checked off any problems, how difficult have these problems made it for you to do your work, take care of things at home, or get along with other people?       Allergies   Allergen Reactions   • Nickel Rash   • Rapeseed [Brassica Napus Seed Oil] Hives     Had to go to ER for treatment.   • Ace Inhibitors Unknown (See Comments)     NOT  SURE   • Acetaminophen Swelling   • Brompheniramine Unknown (See Comments)     NOT SURE   • Codeine Nausea And Vomiting   • Codeine Sulfate    • Diclofenac Urinary Retention     GI PAIN - ULCERS???   • Flurbiprofen Unknown (See Comments)     NOT SURE   • Ibuprofen Unknown (See Comments)     NOT SURE   • Indocyanine Green Unknown (See Comments)     NOT SURE   • Pantoprazole Nausea And Vomiting   • Propoxyphene Unknown (See Comments)     NOT SURE   • Iodinated Diagnostic Agents Rash       Prior to Admission medications    Medication Sig Start Date End Date Taking? Authorizing Provider   albuterol sulfate  (90 Base) MCG/ACT inhaler Inhale 2 puffs Every 4 (Four) Hours As Needed for Wheezing. 3/31/22  Yes Barry Easley, DO   baclofen (LIORESAL) 10 MG tablet Take 10 mg by mouth 3 (Three) Times a Day. 8/23/21  Yes Soo Dodd MD   carbidopa-levodopa (SINEMET)  MG per tablet Take 1.5 tablets by mouth 3 (Three) Times a Day. 7/13/20  Yes Soo Dodd MD   cetirizine (zyrTEC) 10 MG tablet Take 10 mg by mouth Daily.   Yes Soo Dodd MD   Cholecalciferol 10 MCG/ML liquid liquid Take 400 Units by mouth Daily.   Yes Soo Dodd MD   dimenhyDRINATE (DRAMAMINE) 50 MG tablet Take 50 mg by mouth At Night As Needed for movement disorders.   Yes Soo Dodd MD   fluticasone (FLOVENT HFA) 110 MCG/ACT inhaler    Yes Soo Dodd MD   fluticasone (FLOVENT HFA) 110  MCG/ACT inhaler Inhale 2 puffs 2 (Two) Times a Day. 3/31/22  Yes Barry Easley, DO   lidocaine (LIDODERM) 5 %  11/10/21  Yes Soo Dodd MD   magnesium oxide (MAG-OX) 400 MG tablet Take 400 mg by mouth 2 (Two) Times a Day.   Yes Soo Dodd MD   meclizine (ANTIVERT) 25 MG tablet Take 25 mg by mouth daily. 3/14/16  Yes Soo Dodd MD   Multiple Vitamins-Minerals (HAIR SKIN AND NAILS FORMULA PO) Take 1 tablet by mouth Daily.   Yes Soo Dodd MD   nitroglycerin (NITROSTAT) 0.4 MG SL tablet Place 1 tablet under the tongue Every 5 (Five) Minutes As Needed for Chest Pain. 5/18/17  Yes Malina Breen MD   ondansetron (ZOFRAN) 4 MG tablet ondansetron HCl 4 mg tablet   Yes Soo Dodd MD   oxyCODONE-acetaminophen (PERCOCET) 7.5-325 MG per tablet Take 1 tablet by mouth 2 (Two) Times a Day. 12/9/21  Yes Soo Dodd MD   Probiotic Product (SOLUBLE FIBER/PROBIOTICS PO) Take 1 tablet by mouth Daily.   Yes Soo Dodd MD   SUMAtriptan (IMITREX) 50 MG tablet Take 1 tablet by mouth 1 (One) Time As Needed for Migraine for up to 1 dose. Take one tablet at onset of headache. May repeat dose one time in 2 hours if headache not relieved. 2/14/22  Yes Kamron Taylor DO   triamcinolone (KENALOG) 0.1 % cream triamcinolone acetonide 0.1 % topical cream   Yes Soo Dodd MD   vitamin E 400 UNIT capsule Take 400 Units by mouth Daily. 3/10/14  Yes Soo Dodd MD   ALBUTEROL IN Inhale 1 inhaler As Needed.    Soo Dodd MD   amoxicillin (AMOXIL) 875 MG tablet Take 1 tablet by mouth 2 (Two) Times a Day. 3/31/22   Barry Easley, DO   aspirin 81 MG tablet Take 81 mg by mouth Daily. 3/10/14   Soo Dodd MD   nebivolol (Bystolic) 2.5 MG tablet Take 1 tablet by mouth Daily for 90 days. 2/14/22 5/15/22  Claudia, Kamron Barry, DO        Patient Active Problem List   Diagnosis   • Abnormal  cardiovascular stress test   • Benign essential hypertension   • Coronary arteriosclerosis in native artery   • Presence of stent in coronary artery   • SSS (sick sinus syndrome) ([facility])   • Palpitations   • Anemia, unspecified   • Arthritis   • Asthma   • Degeneration of lumbar intervertebral disc   • Left lumbar radiculopathy   • Lumbar spondylosis   • Dizziness   • Dyskinesia   • Dystonia   • GERD (gastroesophageal reflux disease)   • Hemangioma   • Hepatitis A   • Leg pain   • Leg swelling   • Leg weakness, bilateral   • Long-term current use of opiate analgesic   • Muscle cramps   • Parkinson disease ([facility])   • Seasonal allergic rhinitis   • Shortness of breath   • Spondylolisthesis of cervical region   • Migraine with aura and without status migrainosus, not intractable   • Postmenopausal   • Sinus infection   • Localized edema        Past Surgical History:   Procedure Laterality Date   • CARDIAC CATHETERIZATION     • CARDIAC CATHETERIZATION N/A 04/23/2018    Procedure: Left Heart Cath;  Surgeon: Malina Breen MD;  Location:  [facility];  Service: Cardiovascular   • CARDIAC CATHETERIZATION N/A 04/23/2018    Procedure: Coronary angiography;  Surgeon: Malina Breen MD;  Location:  [facility];  Service: Cardiovascular   • CARDIAC CATHETERIZATION N/A 04/23/2018    Procedure: Left ventriculography;  Surgeon: Malina Breen MD;  Location:  [facility];  Service: Cardiovascular   • CARPAL TUNNEL RELEASE Bilateral    • CHOLECYSTECTOMY     • COLONOSCOPY     • CORONARY ANGIOPLASTY     • FOOT SURGERY     • HAND SURGERY     • KNEE ARTHROSCOPY Left    • OTHER SURGICAL HISTORY      JOINT SURGERY   • RIB BIOPSY, RIB RESECTION     • SPINAL FUSION     • TONSILLECTOMY AND ADENOIDECTOMY     • TUBAL ABDOMINAL LIGATION         Social History     Socioeconomic History   • Marital status:    Tobacco Use   • Smoking status: Never Smoker   • Smokeless  "tobacco: Never Used   Vaping Use   • Vaping Use: Never used   Substance and Sexual Activity   • Alcohol use: Never   • Drug use: Never   • Sexual activity: Defer     Birth control/protection: Post-menopausal       Family History   Problem Relation Age of Onset   • Heart disease Mother    • Stroke Mother    • Arthritis Mother    • Heart disease Father    • Hypertension Father    • Arthritis Father    • Stomach cancer Father    • Heart disease Brother    • Hypertension Brother    • Arthritis Brother    • Breast cancer Sister    • Arthritis Sister    • Cancer Maternal Grandmother    • Cancer Sister        Family history, surgical history, past medical history, Allergies and med's reviewed with patient today and updated in [facility] EMR.     ROS:  Review of Systems   Respiratory: Positive for cough. Negative for shortness of breath and wheezing.    Cardiovascular: Positive for leg swelling. Negative for chest pain and palpitations.   Skin: Positive for rash.       OBJECTIVE:  Vitals:    22 1334   BP: 140/63   BP Location: Left arm   Patient Position: Sitting   Cuff Size: Adult   Pulse: (!) 42   Temp: 98.6 °F (37 °C)   SpO2: 98%   Weight: 78.5 kg (173 lb)   Height: 161.3 cm (63.5\")     No exam data present   Body mass index is 30.16 kg/m².  No LMP recorded.    Physical Exam  Vitals and nursing note reviewed.   Constitutional:       General: She is not in acute distress.     Appearance: Normal appearance. She is obese.   HENT:      Head: Normocephalic.   Cardiovascular:      Rate and Rhythm: Normal rate and regular rhythm.      Heart sounds: Normal heart sounds. No murmur heard.  Pulmonary:      Effort: Pulmonary effort is normal.      Breath sounds: Normal breath sounds. No wheezing, rhonchi or rales.   Musculoskeletal:      Right lower le+ Edema present.      Left lower le+ Edema present.   Skin:     Findings: Rash present.          Neurological:      Mental Status: She is alert.         Procedures    No visits " with results within 30 Day(s) from this visit.   Latest known visit with results is:   Office Visit on 02/14/2022   Component Date Value Ref Range Status   • Glucose 02/14/2022 99  65 - 99 mg/dL Final   • BUN 02/14/2022 16  8 - 23 mg/dL Final   • Creatinine 02/14/2022 0.69  0.57 - 1.00 mg/dL Final   • Sodium 02/14/2022 141  136 - 145 mmol/L Final   • Potassium 02/14/2022 4.1  3.5 - 5.2 mmol/L Final   • Chloride 02/14/2022 104  98 - 107 mmol/L Final   • CO2 02/14/2022 30.0 (A) 22.0 - 29.0 mmol/L Final   • Calcium 02/14/2022 9.7  8.6 - 10.5 mg/dL Final   • Total Protein 02/14/2022 7.1  6.0 - 8.5 g/dL Final   • Albumin 02/14/2022 4.40  3.50 - 5.20 g/dL Final   • ALT (SGPT) 02/14/2022 16  1 - 33 U/L Final   • AST (SGOT) 02/14/2022 21  1 - 32 U/L Final   • Alkaline Phosphatase 02/14/2022 83  39 - 117 U/L Final   • Total Bilirubin 02/14/2022 0.2  0.0 - 1.2 mg/dL Final   • eGFR Non  Amer 02/14/2022 84  >60 mL/min/1.73 Final   • Globulin 02/14/2022 2.7  gm/dL Final   • A/G Ratio 02/14/2022 1.6  g/dL Final   • BUN/Creatinine Ratio 02/14/2022 23.2  7.0 - 25.0 Final   • Anion Gap 02/14/2022 7.0  5.0 - 15.0 mmol/L Final   • Total Cholesterol 02/14/2022 209 (A) 0 - 200 mg/dL Final   • Triglycerides 02/14/2022 189 (A) 0 - 150 mg/dL Final   • HDL Cholesterol 02/14/2022 68 (A) 40 - 60 mg/dL Final   • LDL Cholesterol  02/14/2022 109 (A) 0 - 100 mg/dL Final   • VLDL Cholesterol 02/14/2022 32  5 - 40 mg/dL Final   • LDL/HDL Ratio 02/14/2022 1.52   Final   • TSH 02/14/2022 3.420  0.270 - 4.200 uIU/mL Final   • Free T4 02/14/2022 1.01  0.93 - 1.70 ng/dL Final    T4 results may be falsely increased if patient taking Biotin.   • WBC 02/14/2022 4.68  3.40 - 10.80 10*3/mm3 Final   • RBC 02/14/2022 4.62  3.77 - 5.28 10*6/mm3 Final   • Hemoglobin 02/14/2022 13.6  12.0 - 15.9 g/dL Final   • Hematocrit 02/14/2022 41.8  34.0 - 46.6 % Final   • MCV 02/14/2022 90.5  79.0 - 97.0 fL Final   • MCH 02/14/2022 29.4  26.6 - 33.0 pg Final   • MCHC  02/14/2022 32.5  31.5 - 35.7 g/dL Final   • RDW 02/14/2022 12.7  12.3 - 15.4 % Final   • RDW-SD 02/14/2022 41.6  37.0 - 54.0 fl Final   • MPV 02/14/2022 12.0  6.0 - 12.0 fL Final   • Platelets 02/14/2022 158  140 - 450 10*3/mm3 Final   • Neutrophil % 02/14/2022 63.8  42.7 - 76.0 % Final   • Lymphocyte % 02/14/2022 21.8  19.6 - 45.3 % Final   • Monocyte % 02/14/2022 9.2  5.0 - 12.0 % Final   • Eosinophil % 02/14/2022 4.1  0.3 - 6.2 % Final   • Basophil % 02/14/2022 0.9  0.0 - 1.5 % Final   • Immature Grans % 02/14/2022 0.2  0.0 - 0.5 % Final   • Neutrophils, Absolute 02/14/2022 2.99  1.70 - 7.00 10*3/mm3 Final   • Lymphocytes, Absolute 02/14/2022 1.02  0.70 - 3.10 10*3/mm3 Final   • Monocytes, Absolute 02/14/2022 0.43  0.10 - 0.90 10*3/mm3 Final   • Eosinophils, Absolute 02/14/2022 0.19  0.00 - 0.40 10*3/mm3 Final   • Basophils, Absolute 02/14/2022 0.04  0.00 - 0.20 10*3/mm3 Final   • Immature Grans, Absolute 02/14/2022 0.01  0.00 - 0.05 10*3/mm3 Final   • nRBC 02/14/2022 0.0  0.0 - 0.2 /100 WBC Final   • Ferritin 02/14/2022 69.50  13.00 - 150.00 ng/mL Final   • Iron 02/14/2022 62  37 - 145 mcg/dL Final   • Iron Saturation 02/14/2022 16 (A) 20 - 50 % Final   • Transferrin 02/14/2022 267  200 - 360 mg/dL Final   • TIBC 02/14/2022 398  298 - 536 mcg/dL Final       ASSESSMENT/ PLAN:    Diagnoses and all orders for this visit:    1. Localized edema (Primary)  Assessment & Plan:  She has some swelling of both lower legs more so on the right than the left.  This was preceded by a long trip.  This may be his be more to some dependency, but will rule out DVT.    Orders:  -     Venous w Reflux Lower Extremity - Bilateral CAR; Future      Orders Placed Today:     No orders of the defined types were placed in this encounter.       Management Plan:     An After Visit Summary was printed and given to the patient at discharge.    Follow-up: Return for f/u after imaging.    Kamron Taylor DO 5/19/2022 13:52 EDT  This note  was electronically signed.

## 2022-05-25 ENCOUNTER — OFFICE VISIT (OUTPATIENT)
Dept: FAMILY MEDICINE CLINIC | Facility: CLINIC | Age: 72
End: 2022-05-25

## 2022-05-25 VITALS
WEIGHT: 173 LBS | DIASTOLIC BLOOD PRESSURE: 60 MMHG | OXYGEN SATURATION: 96 % | SYSTOLIC BLOOD PRESSURE: 102 MMHG | TEMPERATURE: 97.6 F | BODY MASS INDEX: 29.53 KG/M2 | HEIGHT: 64 IN | HEART RATE: 45 BPM

## 2022-05-25 DIAGNOSIS — R06.09 DYSPNEA ON EXERTION: Primary | ICD-10-CM

## 2022-05-25 DIAGNOSIS — R60.0 PEDAL EDEMA: ICD-10-CM

## 2022-05-25 LAB
BILIRUB UR QL STRIP: NEGATIVE
CLARITY UR: CLEAR
COLOR UR: YELLOW
GLUCOSE UR STRIP-MCNC: NEGATIVE MG/DL
HGB UR QL STRIP.AUTO: NEGATIVE
KETONES UR QL STRIP: NEGATIVE
LEUKOCYTE ESTERASE UR QL STRIP.AUTO: NEGATIVE
NITRITE UR QL STRIP: NEGATIVE
NT-PROBNP SERPL-MCNC: 1008 PG/ML (ref 0–900)
PH UR STRIP.AUTO: 6.5 [PH] (ref 5–8)
PROT UR QL STRIP: NEGATIVE
SP GR UR STRIP: 1.01 (ref 1–1.03)
UROBILINOGEN UR QL STRIP: NORMAL

## 2022-05-25 PROCEDURE — 81003 URINALYSIS AUTO W/O SCOPE: CPT | Performed by: FAMILY MEDICINE

## 2022-05-25 PROCEDURE — 99214 OFFICE O/P EST MOD 30 MIN: CPT | Performed by: FAMILY MEDICINE

## 2022-05-25 PROCEDURE — 83880 ASSAY OF NATRIURETIC PEPTIDE: CPT | Performed by: FAMILY MEDICINE

## 2022-05-25 PROCEDURE — 36415 COLL VENOUS BLD VENIPUNCTURE: CPT | Performed by: FAMILY MEDICINE

## 2022-05-25 RX ORDER — SODIUM FLUORIDE 6 MG/ML
PASTE, DENTIFRICE DENTAL SEE ADMIN INSTRUCTIONS
COMMUNITY
Start: 2022-05-04 | End: 2023-03-13

## 2022-05-25 RX ORDER — AMOXICILLIN 500 MG/1
CAPSULE ORAL
COMMUNITY
Start: 2022-04-28 | End: 2022-07-15

## 2022-05-25 NOTE — PROGRESS NOTES
Chief Complaint  Leg Swelling (Feet and legs continue to have swelling and rash.  Dr taylor ordered a venous doppler last week and that was normal) and Rash    SUBJECTIVE  Kristi Ibrahim presents to [facility]    Patient is a 72-year-old history of coronary artery disease just had a normal stress test ejection fraction was 60% back in October weight gain but has been having some swelling right leg more than the left ever since a plane flight a few weeks ago had a venous Doppler by Dr. Taylor which was completely negative patient's family does have varicose veins and sits a fair amount and and is developing a rash on her right lower leg which is probably stasis dermatitis and was told to eat extra salt by her neurologist told her that this was not a good idea    PAST MEDICAL HISTORY  Allergies   Allergen Reactions   • Nickel Rash   • Rapeseed [Brassica Napus Seed Oil] Hives     Had to go to ER for treatment.   • Ace Inhibitors Unknown (See Comments)     NOT  SURE   • Acetaminophen Swelling   • Brompheniramine Unknown (See Comments)     NOT SURE   • Codeine Nausea And Vomiting   • Codeine Sulfate    • Diclofenac Urinary Retention     GI PAIN - ULCERS???   • Flurbiprofen Unknown (See Comments)     NOT SURE   • Ibuprofen Unknown (See Comments)     NOT SURE   • Indocyanine Green Unknown (See Comments)     NOT SURE   • Pantoprazole Nausea And Vomiting   • Propoxyphene Unknown (See Comments)     NOT SURE   • Iodinated Diagnostic Agents Rash        Past Surgical History:   • ADENOIDECTOMY   • CARDIAC CATHETERIZATION   • CARDIAC CATHETERIZATION    Procedure: Left Heart Cath;  Surgeon: Malina Breen MD;  Location: [facility] INVASIVE LOCATION;  Service: Cardiovascular   • CARDIAC CATHETERIZATION    Procedure: Coronary angiography;  Surgeon: Malina Breen MD;  Location: [facility] INVASIVE LOCATION;  Service: Cardiovascular   • CARDIAC CATHETERIZATION    Procedure: Left  ventriculography;  Surgeon: Malina Breen MD;  Location: [facility] INVASIVE LOCATION;  Service: Cardiovascular   • CARPAL TUNNEL RELEASE   • CHOLECYSTECTOMY   • COLONOSCOPY   • CORONARY ANGIOPLASTY   • CORONARY STENT PLACEMENT   • FOOT SURGERY   • HAND SURGERY   • KNEE ARTHROSCOPY   • OTHER SURGICAL HISTORY    JOINT SURGERY   • RIB BIOPSY, RIB RESECTION   • SPINAL FUSION   • TONSILLECTOMY AND ADENOIDECTOMY   • TUBAL ABDOMINAL LIGATION       Social History     Tobacco Use   • Smoking status: Never Smoker   • Smokeless tobacco: Never Used   Substance Use Topics   • Alcohol use: Never       Family History   Problem Relation Age of Onset   • Heart disease Mother    • Stroke Mother    • Arthritis Mother    • Heart disease Father    • Hypertension Father    • Arthritis Father    • Stomach cancer Father    • Heart disease Brother    • Hypertension Brother    • Arthritis Brother    • Breast cancer Sister    • Arthritis Sister    • Cancer Maternal Grandmother    • Cancer Sister         Health Maintenance Due   Topic Date Due   • TDAP/TD VACCINES (1 - Tdap) Never done   • ZOSTER VACCINE (1 of 2) Never done   • HEPATITIS C SCREENING  Never done   • ANNUAL WELLNESS VISIT  Never done   • PAP SMEAR  Never done   • Pneumococcal Vaccine 65+ (2 - PPSV23 or PCV20) 09/21/2018   • COVID-19 Vaccine (2 - Moderna series) 12/03/2021        Last Completed Colonoscopy          COLORECTAL CANCER SCREENING (COLONOSCOPY - Every 10 Years) Next due on 9/11/2027 09/11/2017  Outside Claim: [place] COLONOSCOPY W/BIOPSY SINGLE/MULTIPLE                REVIEW OF SYSTEMS    Cardiovascular no chest pain no palpitations does have a history of having extra beats and has a history of heart rate being about 45  Respiratory no shortness of breath dyspnea exertion skin erythematous rash on her right lower leg no itching no pain probably due to venous stasis can use over the triceps and when she already has for about a week but that will probably not  "much most much difference is get a knee compression hose  Musculoskeletal discussed compression hose kneelength discussed using every day discussed seeing Dr. Hernandez for this did not get better we will get a BNP and a urine    OBJECTIVE  Vitals:    05/25/22 1035   BP: 102/60   Pulse: (!) 45   Temp: 97.6 °F (36.4 °C)   SpO2: 96%   Weight: 78.5 kg (173 lb)   Height: 161.3 cm (63.5\")     Body mass index is 30.16 kg/m².    PHYSICAL EXAM    General no distress  Cardiovascular few extra beats no murmur no S3 no JVD  Lungs clear and equal bilaterally  Musculoskeletal 1+ pedal edema on the right very slight edema in the left no calf tenderness erythematous rash in the right medial lower leg not tender to palpation    ASSESSMENT & PLAN  Diagnoses and all orders for this visit:    1. Dyspnea on exertion (Primary)  -     BNP    2. Pedal edema  -     Urinalysis With Microscopic If Indicated (No Culture) - Urine, Clean Catch          Pedal edema due to venous stasis needs to use compression hose needs to decrease salt need to get her feet up when she is sitting to Dr. Taylor if it is DrNavarro Get somewhat better            Patient was given instructions and counseling regarding her condition or for health maintenance advice. Please see specific information pulled into the AVS if appropriate.   Answers for HPI/ROS submitted by the patient on 5/25/2022  Please describe your symptoms.: Right leg and foot swelling with red rash, have had swelling before but not this rash.  Have you had these symptoms before?: Yes  How long have you been having these symptoms?: 5-7 days  What is the primary reason for your visit?: Other      "

## 2022-07-14 ENCOUNTER — TELEPHONE (OUTPATIENT)
Dept: FAMILY MEDICINE CLINIC | Facility: CLINIC | Age: 72
End: 2022-07-14

## 2022-07-14 NOTE — TELEPHONE ENCOUNTER
Caller: Kristi Ibrahim    Relationship: Self    Best call back number: [phone]    What medication are you requesting: ANTIBIOTIC FOR SINUS INFECTION    What are your current symptoms: STUFFY NOSE, WHEN ABLE TO BLOW OUT - ITS YELLOW AND BLOODY, SORE THROAT, SINUS PAIN AND PRESSURE, NASAL PASSAGES SWOLLEN    How long have you been experiencing symptoms: 10 DAYS    Have you had these symptoms before:    [x] Yes  [] No    Have you been treated for these symptoms before:   [x] Yes  [] No    If a prescription is needed, what is your preferred pharmacy and phone number: [facility] [address] [place] [address] [phone] [facility] [phone]      Additional notes:  PATIENT HAS BEEN USING A NOSE SPRAY AND [place] SINUS MEDICATION - THEY HAVE NOT HELPED.

## 2022-07-14 NOTE — TELEPHONE ENCOUNTER
See if we have any spots still open in the afternoon.  We will see her for this and this only though.

## 2022-07-15 ENCOUNTER — OFFICE VISIT (OUTPATIENT)
Dept: FAMILY MEDICINE CLINIC | Facility: CLINIC | Age: 72
End: 2022-07-15

## 2022-07-15 VITALS
TEMPERATURE: 98 F | WEIGHT: 168.6 LBS | BODY MASS INDEX: 28.79 KG/M2 | DIASTOLIC BLOOD PRESSURE: 77 MMHG | HEIGHT: 64 IN | HEART RATE: 78 BPM | SYSTOLIC BLOOD PRESSURE: 120 MMHG | OXYGEN SATURATION: 98 %

## 2022-07-15 DIAGNOSIS — J01.00 ACUTE NON-RECURRENT MAXILLARY SINUSITIS: Primary | ICD-10-CM

## 2022-07-15 LAB
EXPIRATION DATE: NORMAL
INTERNAL CONTROL: NORMAL
Lab: NORMAL
SARS-COV-2 AG UPPER RESP QL IA.RAPID: NOT DETECTED

## 2022-07-15 PROCEDURE — 87426 SARSCOV CORONAVIRUS AG IA: CPT | Performed by: FAMILY MEDICINE

## 2022-07-15 PROCEDURE — 99213 OFFICE O/P EST LOW 20 MIN: CPT | Performed by: FAMILY MEDICINE

## 2022-07-15 RX ORDER — FAMOTIDINE 40 MG/1
TABLET, FILM COATED ORAL
COMMUNITY
Start: 2022-05-27 | End: 2023-03-13

## 2022-07-15 RX ORDER — AMOXICILLIN AND CLAVULANATE POTASSIUM 875; 125 MG/1; MG/1
1 TABLET, FILM COATED ORAL EVERY 12 HOURS
Qty: 20 TABLET | Refills: 0 | Status: SHIPPED | OUTPATIENT
Start: 2022-07-15 | End: 2022-08-01

## 2022-07-15 RX ORDER — KETOCONAZOLE 20 MG/G
CREAM TOPICAL
COMMUNITY
Start: 2022-06-15 | End: 2023-03-13

## 2022-07-15 RX ORDER — HYDROCODONE BITARTRATE AND ACETAMINOPHEN 5; 325 MG/1; MG/1
1 TABLET ORAL EVERY 6 HOURS PRN
COMMUNITY
Start: 2022-05-02 | End: 2022-08-01

## 2022-07-15 RX ORDER — AMANTADINE HYDROCHLORIDE 100 MG/1
CAPSULE, GELATIN COATED ORAL
COMMUNITY
Start: 2022-05-27 | End: 2022-08-01

## 2022-07-15 NOTE — PROGRESS NOTES
Chief Complaint   Patient presents with   • Sinus Problem     Pt c/o sinus issues         Subjective     Kristi Ibrahim  has a past medical history of Allergic, Anemia, unspecified, Arthritis, Asthma, Chest pain, Coronary artery disease, Diverticulosis, Fibromyalgia, Fibromyalgia, primary, GERD (gastroesophageal reflux disease), H/O degenerative disc disease, Headache, Hemangioma of bone (09/10/2019), Hepatitis A, HL (hearing loss), Leg pain, Leg swelling, Leg weakness, bilateral (09/10/2019), Low back pain (08/25/2020), Lumbar herniated disc, Lumbar stenosis (09/10/2019), Mitral valve insufficiency, Muscle cramps, Parkinson's disease (HCC), Scoliosis, Shortness of breath, Sinus infection (03/31/2022), Spondylolisthesis of cervical region (09/10/2019), Spondylolisthesis of lumbar region (08/25/2020), Tricuspid regurgitation, and Voice hoarseness.    Sinusitis- she states this been ongoing for the past few weeks.  She has had some head congestion drainage and some bloody drainage from her nose.  She did have a dental procedure a few months ago on that side.  Since then she has also had some right maxillary pain and pressure.  She also complains of a scratchy throat and a cough.  She denies any fevers or chills.  She has taken some over-the-counter sinus medications without any benefit.      PHQ-2 Depression Screening  Little interest or pleasure in doing things?     Feeling down, depressed, or hopeless?     PHQ-2 Total Score     PHQ-9 Depression Screening  Little interest or pleasure in doing things?     Feeling down, depressed, or hopeless?     Trouble falling or staying asleep, or sleeping too much?     Feeling tired or having little energy?     Poor appetite or overeating?     Feeling bad about yourself - or that you are a failure or have let yourself or your family down?     Trouble concentrating on things, such as reading the newspaper or watching television?     Moving or speaking so slowly that other people  could have noticed? Or the opposite - being so fidgety or restless that you have been moving around a lot more than usual?     Thoughts that you would be better off dead, or of hurting yourself in some way?     PHQ-9 Total Score     If you checked off any problems, how difficult have these problems made it for you to do your work, take care of things at home, or get along with other people?       Allergies   Allergen Reactions   • Nickel Rash   • Rapeseed [Brassica Napus Seed Oil] Hives     Had to go to ER for treatment.   • Ace Inhibitors Unknown (See Comments)     NOT  SURE   • Acetaminophen Swelling   • Brompheniramine Unknown (See Comments)     NOT SURE   • Codeine Nausea And Vomiting   • Codeine Sulfate    • Diclofenac Urinary Retention     GI PAIN - ULCERS???   • Flurbiprofen Unknown (See Comments)     NOT SURE   • Ibuprofen Unknown (See Comments)     NOT SURE   • Indocyanine Green Unknown (See Comments)     NOT SURE   • Pantoprazole Nausea And Vomiting   • Propoxyphene Unknown (See Comments)     NOT SURE   • Iodinated Diagnostic Agents Rash       Prior to Admission medications    Medication Sig Start Date End Date Taking? Authorizing Provider   albuterol sulfate  (90 Base) MCG/ACT inhaler Inhale 2 puffs Every 4 (Four) Hours As Needed for Wheezing. 3/31/22  Yes Barry Easley, DO   amantadine (SYMMETREL) 100 MG capsule  5/27/22  Yes Soo Dodd MD   aspirin 81 MG tablet Take 81 mg by mouth Daily. 3/10/14  Yes Soo Dodd MD   baclofen (LIORESAL) 10 MG tablet Take 10 mg by mouth 3 (Three) Times a Day. 8/23/21  Yes Soo Dodd MD   carbidopa-levodopa (SINEMET)  MG per tablet Take 1.5 tablets by mouth 3 (Three) Times a Day. 7/13/20  Yes Soo Dodd MD   cetirizine (zyrTEC) 10 MG tablet Take 10 mg by mouth Daily.   Yes Soo Dodd MD   dimenhyDRINATE (DRAMAMINE) 50 MG tablet Take 50 mg by mouth At Night As Needed for movement disorders.    Yes Soo Dodd MD   meclizine (ANTIVERT) 25 MG tablet Take 25 mg by mouth daily. 3/14/16  Yes Soo Dodd MD   Multiple Vitamins-Minerals (HAIR SKIN AND NAILS FORMULA PO) Take 1 tablet by mouth Daily.   Yes Soo Dodd MD   nitroglycerin (NITROSTAT) 0.4 MG SL tablet Place 1 tablet under the tongue Every 5 (Five) Minutes As Needed for Chest Pain. 5/18/17  Yes Malina Breen MD   ondansetron (ZOFRAN) 4 MG tablet ondansetron HCl 4 mg tablet   Yes Soo Dodd MD   oxyCODONE-acetaminophen (PERCOCET) 7.5-325 MG per tablet Take 1 tablet by mouth 2 (Two) Times a Day. 12/9/21  Yes Soo Dodd MD   PreviDent 5000 Booster Plus 1.1 % paste See Admin Instructions. 5/4/22  Yes Soo Dodd MD   Probiotic Product (SOLUBLE FIBER/PROBIOTICS PO) Take 1 tablet by mouth Daily.   Yes Soo Dodd MD   SUMAtriptan (IMITREX) 50 MG tablet Take 1 tablet by mouth 1 (One) Time As Needed for Migraine for up to 1 dose. Take one tablet at onset of headache. May repeat dose one time in 2 hours if headache not relieved. 2/14/22  Yes Kamron Taylor, DO   triamcinolone (KENALOG) 0.1 % cream triamcinolone acetonide 0.1 % topical cream   Yes Soo Dodd MD   vitamin E 400 UNIT capsule Take 400 Units by mouth Daily. 3/10/14  Yes Soo Dodd MD   Cholecalciferol 10 MCG/ML liquid liquid Take 400 Units by mouth Daily.    Soo Dodd MD   famotidine (PEPCID) 40 MG tablet  5/27/22   Soo Dodd MD   fluocinonide (LIDEX) 0.05 % cream  6/18/22   Soo Dodd MD   fluticasone (FLOVENT HFA) 110 MCG/ACT inhaler Inhale 2 puffs 2 (Two) Times a Day. 3/31/22   Barry Easley, DO   HYDROcodone-acetaminophen (NORCO) 5-325 MG per tablet Take 1 tablet by mouth Every 6 (Six) Hours As Needed. for pain 5/2/22   Soo Dodd MD   ketoconazole (NIZORAL) 2 % cream  6/15/22   Provider, MD Soo   lidocaine (LIDODERM) 5  %  11/10/21   ProviderSoo MD   magnesium oxide (MAG-OX) 400 MG tablet Take 400 mg by mouth 2 (Two) Times a Day.    Soo Dodd MD   nebivolol (Bystolic) 2.5 MG tablet Take 1 tablet by mouth Daily for 90 days. 2/14/22   Kamron Taylor DO   amoxicillin (AMOXIL) 500 MG capsule TAKE 4 CAPSULES ONE HOUR PRIOR TO DENTAL APPOINTMENT 4/28/22 7/15/22  ProviderSoo MD        Patient Active Problem List   Diagnosis   • Abnormal cardiovascular stress test   • Benign essential hypertension   • Coronary arteriosclerosis in native artery   • Presence of stent in coronary artery   • SSS (sick sinus syndrome) ([facility])   • Palpitations   • Anemia, unspecified   • Arthritis   • Asthma   • Degeneration of lumbar intervertebral disc   • Left lumbar radiculopathy   • Lumbar spondylosis   • Dizziness   • Dyskinesia   • Dystonia   • GERD (gastroesophageal reflux disease)   • Hemangioma   • Hepatitis A   • Leg pain   • Leg swelling   • Leg weakness, bilateral   • Long-term current use of opiate analgesic   • Muscle cramps   • Parkinson disease ([facility])   • Seasonal allergic rhinitis   • Shortness of breath   • Spondylolisthesis of cervical region   • Migraine with aura and without status migrainosus, not intractable   • Postmenopausal   • Sinus infection   • Localized edema        Past Surgical History:   Procedure Laterality Date   • ADENOIDECTOMY     • CARDIAC CATHETERIZATION     • CARDIAC CATHETERIZATION N/A 04/23/2018    Procedure: Left Heart Cath;  Surgeon: Malina Breen MD;  Location: [facility] CATH INVASIVE LOCATION;  Service: Cardiovascular   • CARDIAC CATHETERIZATION N/A 04/23/2018    Procedure: Coronary angiography;  Surgeon: Malina Breen MD;  Location: [facility] INVASIVE LOCATION;  Service: Cardiovascular   • CARDIAC CATHETERIZATION N/A 04/23/2018    Procedure: Left ventriculography;  Surgeon: Malina Breen MD;  Location: [facility] CATH INVASIVE LOCATION;  Service:  Cardiovascular   • CARPAL TUNNEL RELEASE Bilateral    • CHOLECYSTECTOMY     • COLONOSCOPY     • CORONARY ANGIOPLASTY     • CORONARY STENT PLACEMENT     • FOOT SURGERY     • HAND SURGERY     • KNEE ARTHROSCOPY Left    • OTHER SURGICAL HISTORY      JOINT SURGERY   • RIB BIOPSY, RIB RESECTION     • SPINAL FUSION     • TONSILLECTOMY AND ADENOIDECTOMY     • TUBAL ABDOMINAL LIGATION         Social History     Socioeconomic History   • Marital status:    Tobacco Use   • Smoking status: Never Smoker   • Smokeless tobacco: Never Used   Vaping Use   • Vaping Use: Never used   Substance and Sexual Activity   • Alcohol use: Never   • Drug use: Never   • Sexual activity: Defer     Birth control/protection: Post-menopausal       Family History   Problem Relation Age of Onset   • Heart disease Mother    • Stroke Mother    • Arthritis Mother    • Heart disease Father    • Hypertension Father    • Arthritis Father    • Stomach cancer Father    • Heart disease Brother    • Hypertension Brother    • Arthritis Brother    • Breast cancer Sister    • Arthritis Sister    • Cancer Maternal Grandmother    • Cancer Sister        Family history, surgical history, past medical history, Allergies and meds reviewed with patient today and updated in [facility] EMR.     ROS:  Review of Systems   Constitutional: Negative for chills, fatigue and fever.   HENT: Positive for congestion, postnasal drip, rhinorrhea, sinus pressure and sore throat.    Respiratory: Positive for cough. Negative for chest tightness, shortness of breath and wheezing.    Allergic/Immunologic: Negative for environmental allergies.   Neurological: Negative for headache.   Psychiatric/Behavioral: Negative for depressed mood. The patient is not nervous/anxious.        OBJECTIVE:  Vitals:    07/15/22 1115   BP: 120/77   BP Location: Left arm   Patient Position: Sitting   Cuff Size: Adult   Pulse: 78   Temp: 98 °F (36.7 °C)   TempSrc: Temporal   SpO2: 98%   Weight: 76.5 kg (168 lb  "9.6 oz)   Height: 161.3 cm (63.5\")     No exam data present   Body mass index is 29.4 kg/m².  No LMP recorded. Patient is postmenopausal.    Physical Exam  Vitals and nursing note reviewed.   Constitutional:       General: She is not in acute distress.     Appearance: Normal appearance. She is normal weight.   HENT:      Head: Normocephalic.      Right Ear: Tympanic membrane, ear canal and external ear normal.      Left Ear: Tympanic membrane, ear canal and external ear normal.      Nose: Nose normal.      Mouth/Throat:      Mouth: Mucous membranes are moist.      Pharynx: Oropharynx is clear.   Eyes:      General: No scleral icterus.     Conjunctiva/sclera: Conjunctivae normal.      Pupils: Pupils are equal, round, and reactive to light.   Cardiovascular:      Rate and Rhythm: Normal rate and regular rhythm.      Pulses: Normal pulses.      Heart sounds: Normal heart sounds. No murmur heard.  Pulmonary:      Effort: Pulmonary effort is normal.      Breath sounds: Normal breath sounds. No wheezing, rhonchi or rales.   Musculoskeletal:      Cervical back: Neck supple. No rigidity or tenderness.   Lymphadenopathy:      Cervical: No cervical adenopathy.   Skin:     General: Skin is warm and dry.      Coloration: Skin is not jaundiced.      Findings: No rash.   Neurological:      General: No focal deficit present.      Mental Status: She is alert and oriented to person, place, and time.   Psychiatric:         Mood and Affect: Mood normal.         Thought Content: Thought content normal.         Judgment: Judgment normal.         Procedures    Office Visit on 07/15/2022   Component Date Value Ref Range Status   • SARS Antigen 07/15/2022 Not Detected  Not Detected, Presumptive Negative Final   • Internal Control 07/15/2022 Passed  Passed Final   • Lot Number 07/15/2022 [id number]   Final   • Expiration Date 07/15/2022 09/12/2023   Final       ASSESSMENT/ PLAN:    Diagnoses and all orders for this visit:    1. Acute " non-recurrent maxillary sinusitis (Primary)  Assessment & Plan:  With her current symptoms we will rule out COVID-19.  Her COVID test was negative.  Therefore we will treat her acute sinusitis.    Orders:  -     POCT SARS-CoV-2 Antigen MARIANA      Orders Placed Today:     No orders of the defined types were placed in this encounter.       Management Plan:     An After Visit Summary was printed and given to the patient at discharge.    Follow-up: Return if symptoms worsen or fail to improve.    Kamron Taylor DO 7/15/2022 11:59 EDT  This note was electronically signed.

## 2022-07-15 NOTE — ASSESSMENT & PLAN NOTE
With her current symptoms we will rule out COVID-19.  Her COVID test was negative.  Therefore we will treat her acute sinusitis.

## 2022-08-01 ENCOUNTER — OFFICE VISIT (OUTPATIENT)
Dept: FAMILY MEDICINE CLINIC | Facility: CLINIC | Age: 72
End: 2022-08-01

## 2022-08-01 VITALS
OXYGEN SATURATION: 97 % | BODY MASS INDEX: 28.17 KG/M2 | WEIGHT: 165 LBS | DIASTOLIC BLOOD PRESSURE: 94 MMHG | HEART RATE: 85 BPM | HEIGHT: 64 IN | SYSTOLIC BLOOD PRESSURE: 120 MMHG | TEMPERATURE: 97.7 F

## 2022-08-01 DIAGNOSIS — R30.0 DYSURIA: ICD-10-CM

## 2022-08-01 DIAGNOSIS — I25.10 CORONARY ARTERIOSCLEROSIS IN NATIVE ARTERY: ICD-10-CM

## 2022-08-01 DIAGNOSIS — I10 BENIGN ESSENTIAL HYPERTENSION: Primary | ICD-10-CM

## 2022-08-01 DIAGNOSIS — E78.49 OTHER HYPERLIPIDEMIA: ICD-10-CM

## 2022-08-01 LAB
ALBUMIN SERPL-MCNC: 4.6 G/DL (ref 3.5–5.2)
ALBUMIN/GLOB SERPL: 1.8 G/DL
ALP SERPL-CCNC: 72 U/L (ref 39–117)
ALT SERPL W P-5'-P-CCNC: 15 U/L (ref 1–33)
ANION GAP SERPL CALCULATED.3IONS-SCNC: 10.6 MMOL/L (ref 5–15)
AST SERPL-CCNC: 26 U/L (ref 1–32)
BILIRUB BLD-MCNC: NEGATIVE MG/DL
BILIRUB SERPL-MCNC: 0.5 MG/DL (ref 0–1.2)
BUN SERPL-MCNC: 11 MG/DL (ref 8–23)
BUN/CREAT SERPL: 14.9 (ref 7–25)
CALCIUM SPEC-SCNC: 9.8 MG/DL (ref 8.6–10.5)
CHLORIDE SERPL-SCNC: 101 MMOL/L (ref 98–107)
CHOLEST SERPL-MCNC: 206 MG/DL (ref 0–200)
CLARITY, POC: CLEAR
CO2 SERPL-SCNC: 28.4 MMOL/L (ref 22–29)
COLOR UR: YELLOW
CREAT SERPL-MCNC: 0.74 MG/DL (ref 0.57–1)
EGFRCR SERPLBLD CKD-EPI 2021: 86.1 ML/MIN/1.73
EXPIRATION DATE: ABNORMAL
GLOBULIN UR ELPH-MCNC: 2.6 GM/DL
GLUCOSE SERPL-MCNC: 102 MG/DL (ref 65–99)
GLUCOSE UR STRIP-MCNC: NEGATIVE MG/DL
HDLC SERPL-MCNC: 75 MG/DL (ref 40–60)
KETONES UR QL: NEGATIVE
LDLC SERPL CALC-MCNC: 110 MG/DL (ref 0–100)
LDLC/HDLC SERPL: 1.43 {RATIO}
LEUKOCYTE EST, POC: ABNORMAL
Lab: ABNORMAL
NITRITE UR-MCNC: NEGATIVE MG/ML
PH UR: 7 [PH] (ref 5–8)
POTASSIUM SERPL-SCNC: 4.5 MMOL/L (ref 3.5–5.2)
PROT SERPL-MCNC: 7.2 G/DL (ref 6–8.5)
PROT UR STRIP-MCNC: NEGATIVE MG/DL
RBC # UR STRIP: NEGATIVE /UL
SODIUM SERPL-SCNC: 140 MMOL/L (ref 136–145)
SP GR UR: 1.01 (ref 1–1.03)
TRIGL SERPL-MCNC: 118 MG/DL (ref 0–150)
UROBILINOGEN UR QL: NORMAL
VLDLC SERPL-MCNC: 21 MG/DL (ref 5–40)

## 2022-08-01 PROCEDURE — 99214 OFFICE O/P EST MOD 30 MIN: CPT | Performed by: FAMILY MEDICINE

## 2022-08-01 PROCEDURE — 81003 URINALYSIS AUTO W/O SCOPE: CPT | Performed by: FAMILY MEDICINE

## 2022-08-01 PROCEDURE — 36415 COLL VENOUS BLD VENIPUNCTURE: CPT | Performed by: FAMILY MEDICINE

## 2022-08-01 PROCEDURE — 80053 COMPREHEN METABOLIC PANEL: CPT | Performed by: FAMILY MEDICINE

## 2022-08-01 PROCEDURE — 80061 LIPID PANEL: CPT | Performed by: FAMILY MEDICINE

## 2022-08-01 NOTE — ASSESSMENT & PLAN NOTE
She has been taking her over-the-counter supplement.  We will go ahead and update her lipid profile.  But I instructed her that there is also additional benefit with statins above and beyond lowering her cholesterols with cardiovascular disease.

## 2022-08-01 NOTE — PROGRESS NOTES
Chief Complaint   Patient presents with   • Hypertension     Pt here for 6 month f/u; pt states she is still having issues with sinus drainage she would like to discuss. Pt also states she is having issues with vaginal burning and stinging x 3 days, pt states she has not been voiding as much although she is taking a water pill.        Subjective     Kristi Ibrahim  has a past medical history of Allergic, Anemia, unspecified, Arthritis, Asthma, Benign essential hypertension, Diverticulosis, Fibromyalgia, Fibromyalgia, primary, GERD (gastroesophageal reflux disease), H/O degenerative disc disease, Headache, Hemangioma of bone (09/10/2019), Hepatitis A, HL (hearing loss), Leg pain, Leg swelling, Leg weakness, bilateral (09/10/2019), Low back pain (08/25/2020), Lumbar herniated disc, Lumbar stenosis (09/10/2019), Mitral valve insufficiency, Muscle cramps, Parkinson's disease (HCC), Scoliosis, Seasonal allergic rhinitis, Shortness of breath, Sinus infection (03/31/2022), Spondylolisthesis of cervical region (09/10/2019), Spondylolisthesis of lumbar region (08/25/2020), Tricuspid regurgitation, and Voice hoarseness.    Hypertension- she checks her blood pressure at home on a regular basis.  It is typically in the 120s over 60-70.    Hyperlipidemia- she states she has been back taking her over-the-counter supplement on a regular basis.  Previously her cholesterol had been much lower when she was taking it.    Coronary artery disease- he denies any chest pain tightness or heaviness.    Vaginal burning- she notices primarily when she urinates.  She does have some frequency but more notable when she takes her diuretic.  She is denies any dyspareunia area.      PHQ-2 Depression Screening  Little interest or pleasure in doing things?     Feeling down, depressed, or hopeless?     PHQ-2 Total Score     PHQ-9 Depression Screening  Little interest or pleasure in doing things?     Feeling down, depressed, or hopeless?     Trouble  falling or staying asleep, or sleeping too much?     Feeling tired or having little energy?     Poor appetite or overeating?     Feeling bad about yourself - or that you are a failure or have let yourself or your family down?     Trouble concentrating on things, such as reading the newspaper or watching television?     Moving or speaking so slowly that other people could have noticed? Or the opposite - being so fidgety or restless that you have been moving around a lot more than usual?     Thoughts that you would be better off dead, or of hurting yourself in some way?     PHQ-9 Total Score     If you checked off any problems, how difficult have these problems made it for you to do your work, take care of things at home, or get along with other people?       Allergies   Allergen Reactions   • Nickel Rash   • Rapeseed [Brassica Napus Seed Oil] Hives     Had to go to ER for treatment.   • Ace Inhibitors Unknown (See Comments)     NOT  SURE   • Acetaminophen Swelling   • Brompheniramine Unknown (See Comments)     NOT SURE   • Codeine Nausea And Vomiting   • Codeine Sulfate    • Diclofenac Urinary Retention     GI PAIN - ULCERS???   • Flurbiprofen Unknown (See Comments)     NOT SURE   • Ibuprofen Unknown (See Comments)     NOT SURE   • Indocyanine Green Unknown (See Comments)     NOT SURE   • Pantoprazole Nausea And Vomiting   • Propoxyphene Unknown (See Comments)     NOT SURE   • Iodinated Diagnostic Agents Rash       Prior to Admission medications    Medication Sig Start Date End Date Taking? Authorizing Provider   albuterol sulfate  (90 Base) MCG/ACT inhaler Inhale 2 puffs Every 4 (Four) Hours As Needed for Wheezing. 3/31/22  Yes Barry Easley, DO   aspirin 81 MG tablet Take 81 mg by mouth Daily. 3/10/14  Yes Soo Dodd MD   baclofen (LIORESAL) 10 MG tablet Take 10 mg by mouth 3 (Three) Times a Day. 8/23/21  Yes Soo Dodd MD   carbidopa-levodopa (SINEMET)  MG per tablet  Take 1.5 tablets by mouth 3 (Three) Times a Day. 7/13/20  Yes Soo Dodd MD   cetirizine (zyrTEC) 10 MG tablet Take 10 mg by mouth Daily.   Yes Soo Dodd MD   Cholecalciferol 10 MCG/ML liquid liquid Take 400 Units by mouth Daily.   Yes Soo Dodd MD   dimenhyDRINATE (DRAMAMINE) 50 MG tablet Take 50 mg by mouth At Night As Needed for movement disorders.   Yes Soo Dodd MD   famotidine (PEPCID) 40 MG tablet  5/27/22  Yes Soo Dodd MD   fluticasone (FLOVENT HFA) 110 MCG/ACT inhaler Inhale 2 puffs 2 (Two) Times a Day. 3/31/22  Yes Barry Easley, DO   ketoconazole (NIZORAL) 2 % cream  6/15/22  Yes Soo Dodd MD   lidocaine (LIDODERM) 5 %  11/10/21  Yes Soo Dodd MD   magnesium oxide (MAG-OX) 400 MG tablet Take 400 mg by mouth 2 (Two) Times a Day.   Yes Soo Dodd MD   meclizine (ANTIVERT) 25 MG tablet Take 25 mg by mouth daily. 3/14/16  Yes Soo Dodd MD   Multiple Vitamins-Minerals (HAIR SKIN AND NAILS FORMULA PO) Take 1 tablet by mouth Daily.   Yes Soo Dodd MD   nebivolol (Bystolic) 2.5 MG tablet Take 1 tablet by mouth Daily for 90 days. 2/14/22  Yes Kamron Taylor, DO   nitroglycerin (NITROSTAT) 0.4 MG SL tablet Place 1 tablet under the tongue Every 5 (Five) Minutes As Needed for Chest Pain. 5/18/17  Yes Malina Breen MD   ondansetron (ZOFRAN) 4 MG tablet ondansetron HCl 4 mg tablet   Yes Soo Dodd MD   PreviDent 5000 Booster Plus 1.1 % paste See Admin Instructions. 5/4/22  Yes Soo Dodd MD   Probiotic Product (SOLUBLE FIBER/PROBIOTICS PO) Take 1 tablet by mouth Daily.   Yes Soo Dodd MD   SUMAtriptan (IMITREX) 50 MG tablet Take 1 tablet by mouth 1 (One) Time As Needed for Migraine for up to 1 dose. Take one tablet at onset of headache. May repeat dose one time in 2 hours if headache not relieved. 2/14/22  Yes Kamron Taylor, DO    triamcinolone (KENALOG) 0.1 % cream triamcinolone acetonide 0.1 % topical cream   Yes Soo Dodd MD   vitamin E 400 UNIT capsule Take 400 Units by mouth Daily. 3/10/14  Yes Soo Dodd MD   amantadine (SYMMETREL) 100 MG capsule  5/27/22   Soo Dodd MD   amoxicillin-clavulanate (Augmentin) 875-125 MG per tablet Take 1 tablet by mouth Every 12 (Twelve) Hours. With food 7/15/22   Kamron Taylor,    fluocinonide (LIDEX) 0.05 % cream  6/18/22   Soo Dodd MD   HYDROcodone-acetaminophen (NORCO) 5-325 MG per tablet Take 1 tablet by mouth Every 6 (Six) Hours As Needed. for pain 5/2/22   Soo Dodd MD   oxyCODONE-acetaminophen (PERCOCET) 7.5-325 MG per tablet Take 1 tablet by mouth 2 (Two) Times a Day. 12/9/21 8/1/22  Soo Dodd MD        Patient Active Problem List   Diagnosis   • Abnormal cardiovascular stress test   • Benign essential hypertension   • Coronary arteriosclerosis in native artery   • Presence of stent in coronary artery   • SSS (sick sinus syndrome) (HCC)   • Palpitations   • Anemia, unspecified   • Arthritis   • Asthma   • Degeneration of lumbar intervertebral disc   • Left lumbar radiculopathy   • Lumbar spondylosis   • Dizziness   • Dyskinesia   • Dystonia   • GERD (gastroesophageal reflux disease)   • Hemangioma   • Hepatitis A   • Leg pain   • Leg swelling   • Leg weakness, bilateral   • Long-term current use of opiate analgesic   • Muscle cramps   • Parkinson disease ([facility])   • Seasonal allergic rhinitis   • Shortness of breath   • Spondylolisthesis of cervical region   • Migraine with aura and without status migrainosus, not intractable   • Postmenopausal   • Sinus infection   • Localized edema   • Other hyperlipidemia   • Dysuria        Past Surgical History:   Procedure Laterality Date   • ADENOIDECTOMY     • CARDIAC CATHETERIZATION     • CARDIAC CATHETERIZATION N/A 04/23/2018    Procedure: Left Heart Cath;  Surgeon:  Malina Breen MD;  Location:  [facility];  Service: Cardiovascular   • CARDIAC CATHETERIZATION N/A 04/23/2018    Procedure: Coronary angiography;  Surgeon: Malina Breen MD;  Location:  [facility];  Service: Cardiovascular   • CARDIAC CATHETERIZATION N/A 04/23/2018    Procedure: Left ventriculography;  Surgeon: Malina Breen MD;  Location:  [facility];  Service: Cardiovascular   • CARPAL TUNNEL RELEASE Bilateral    • CHOLECYSTECTOMY     • COLONOSCOPY     • CORONARY ANGIOPLASTY     • CORONARY STENT PLACEMENT     • FOOT SURGERY     • HAND SURGERY     • KNEE ARTHROSCOPY Left    • OTHER SURGICAL HISTORY      JOINT SURGERY   • RIB BIOPSY, RIB RESECTION     • SPINAL FUSION     • TONSILLECTOMY AND ADENOIDECTOMY     • TUBAL ABDOMINAL LIGATION         Social History     Socioeconomic History   • Marital status:    Tobacco Use   • Smoking status: Never Smoker   • Smokeless tobacco: Never Used   Vaping Use   • Vaping Use: Never used   Substance and Sexual Activity   • Alcohol use: Never   • Drug use: Never   • Sexual activity: Defer     Birth control/protection: Post-menopausal       Family History   Problem Relation Age of Onset   • Heart disease Mother    • Stroke Mother    • Arthritis Mother    • Heart disease Father    • Hypertension Father    • Arthritis Father    • Stomach cancer Father    • Heart disease Brother    • Hypertension Brother    • Arthritis Brother    • Breast cancer Sister    • Arthritis Sister    • Cancer Maternal Grandmother    • Cancer Sister        Family history, surgical history, past medical history, Allergies and meds reviewed with patient today and updated in [facility] EMR.     ROS:  Review of Systems   Constitutional: Negative for chills, fatigue and fever.   HENT: Positive for congestion. Negative for postnasal drip and rhinorrhea.    Eyes: Positive for visual disturbance. Negative for blurred vision.   Respiratory:  "Negative for cough, chest tightness, shortness of breath and wheezing.    Cardiovascular: Negative for chest pain and palpitations.   Genitourinary: Positive for dysuria and frequency. Negative for dyspareunia and hematuria.   Skin: Negative for rash and skin lesions.   Allergic/Immunologic: Negative for environmental allergies.   Neurological: Positive for headache.   Psychiatric/Behavioral: Negative for depressed mood. The patient is not nervous/anxious.        OBJECTIVE:  Vitals:    08/01/22 1343   BP: 120/94   BP Location: Left arm   Patient Position: Sitting   Cuff Size: Adult   Pulse: 85   Temp: 97.7 °F (36.5 °C)   TempSrc: Temporal   SpO2: 97%   Weight: 74.8 kg (165 lb)   Height: 161.3 cm (63.5\")     No exam data present   Body mass index is 28.77 kg/m².  No LMP recorded. Patient is postmenopausal.    Physical Exam  Vitals and nursing note reviewed.   Constitutional:       General: She is not in acute distress.     Appearance: Normal appearance. She is normal weight.   HENT:      Head: Normocephalic.      Right Ear: Tympanic membrane, ear canal and external ear normal.      Left Ear: Tympanic membrane, ear canal and external ear normal.      Nose: Nose normal.      Mouth/Throat:      Mouth: Mucous membranes are moist.      Pharynx: Oropharynx is clear.   Eyes:      General: No scleral icterus.     Conjunctiva/sclera: Conjunctivae normal.      Pupils: Pupils are equal, round, and reactive to light.   Cardiovascular:      Rate and Rhythm: Normal rate and regular rhythm.      Pulses: Normal pulses.      Heart sounds: Normal heart sounds. No murmur heard.  Pulmonary:      Effort: Pulmonary effort is normal.      Breath sounds: Normal breath sounds. No wheezing, rhonchi or rales.   Musculoskeletal:      Cervical back: Neck supple. No rigidity or tenderness.   Lymphadenopathy:      Cervical: No cervical adenopathy.   Skin:     General: Skin is warm and dry.      Coloration: Skin is not jaundiced.      Findings: No " rash.   Neurological:      General: No focal deficit present.      Mental Status: She is alert and oriented to person, place, and time.   Psychiatric:         Mood and Affect: Mood normal.         Thought Content: Thought content normal.         Judgment: Judgment normal.         Procedures    Office Visit on 07/15/2022   Component Date Value Ref Range Status   • SARS Antigen 07/15/2022 Not Detected  Not Detected, Presumptive Negative Final   • Internal Control 07/15/2022 Passed  Passed Final   • Lot Number 07/15/2022 [id number]   Final   • Expiration Date 07/15/2022 09/12/2023   Final       ASSESSMENT/ PLAN:    Diagnoses and all orders for this visit:    1. Benign essential hypertension (Primary)  Assessment & Plan:  Her home blood pressure readings are much better than here.  We will go ahead and continue her meds and update her labs.    Orders:  -     Comprehensive Metabolic Panel  -     Lipid Panel    2. Coronary arteriosclerosis in native artery  Assessment & Plan:  Currently she is doing well and denies any current anginal-like symptoms.    Orders:  -     Lipid Panel    3. Other hyperlipidemia  Assessment & Plan:  She has been taking her over-the-counter supplement.  We will go ahead and update her lipid profile.  But I instructed her that there is also additional benefit with statins above and beyond lowering her cholesterols with cardiovascular disease.    Orders:  -     Comprehensive Metabolic Panel  -     Lipid Panel    4. Dysuria  Assessment & Plan:  We will check a urine today.    Orders:  -     POCT urinalysis dipstick, automated      Orders Placed Today:     No orders of the defined types were placed in this encounter.       Management Plan:     An After Visit Summary was printed and given to the patient at discharge.    Follow-up: No follow-ups on file.    Kamron Taylor DO 8/1/2022 14:29 EDT  This note was electronically signed.  Answers for HPI/ROS submitted by the patient on 7/30/2022  Please  describe your symptoms.: Rash on legs, swelling of feet and lower legs, sinus issues, finished augumentin, On 21st, still have sores in nose and bloody discharge with sinus congestion. I have now started with pain when urinating. Back pain is in thoracic area, below left shoulder blade.  Have you had these symptoms before?: Yes  How long have you been having these symptoms?: Greater than 2 weeks  What is the primary reason for your visit?: Other

## 2022-08-01 NOTE — ASSESSMENT & PLAN NOTE
Her home blood pressure readings are much better than here.  We will go ahead and continue her meds and update her labs.

## 2022-09-06 RX ORDER — NEBIVOLOL HYDROCHLORIDE 2.5 MG/1
TABLET ORAL
Qty: 90 TABLET | Refills: 0 | Status: SHIPPED | OUTPATIENT
Start: 2022-09-06 | End: 2023-02-13

## 2022-09-29 ENCOUNTER — OFFICE VISIT (OUTPATIENT)
Dept: CARDIOLOGY | Facility: CLINIC | Age: 72
End: 2022-09-29

## 2022-09-29 VITALS
SYSTOLIC BLOOD PRESSURE: 128 MMHG | BODY MASS INDEX: 27.31 KG/M2 | WEIGHT: 160 LBS | HEIGHT: 64 IN | DIASTOLIC BLOOD PRESSURE: 85 MMHG | HEART RATE: 70 BPM

## 2022-09-29 DIAGNOSIS — I10 BENIGN ESSENTIAL HYPERTENSION: ICD-10-CM

## 2022-09-29 DIAGNOSIS — R00.2 PALPITATIONS: ICD-10-CM

## 2022-09-29 DIAGNOSIS — M79.89 LEG SWELLING: Primary | ICD-10-CM

## 2022-09-29 PROCEDURE — 99214 OFFICE O/P EST MOD 30 MIN: CPT | Performed by: INTERNAL MEDICINE

## 2022-09-29 PROCEDURE — 93000 ELECTROCARDIOGRAM COMPLETE: CPT | Performed by: INTERNAL MEDICINE

## 2022-09-29 RX ORDER — HYDROCHLOROTHIAZIDE 12.5 MG/1
12.5 CAPSULE, GELATIN COATED ORAL DAILY
Qty: 30 CAPSULE | Refills: 11 | Status: SHIPPED | OUTPATIENT
Start: 2022-09-29

## 2022-09-29 RX ORDER — FERROUS SULFATE 325(65) MG
1 TABLET ORAL DAILY
COMMUNITY
Start: 2022-09-26 | End: 2023-03-13

## 2022-09-29 RX ORDER — DEXAMETHASONE SODIUM PHOSPHATE 1 MG/ML
SOLUTION/ DROPS OPHTHALMIC
COMMUNITY
Start: 2022-09-22 | End: 2022-12-07 | Stop reason: ALTCHOICE

## 2022-09-29 RX ORDER — AMANTADINE HYDROCHLORIDE 100 MG/1
CAPSULE, GELATIN COATED ORAL
COMMUNITY
Start: 2022-09-21

## 2022-09-29 NOTE — PROGRESS NOTES
"1 YR FOLLOW UP, SWELLING IN ANKLES AND FEET. RASH  LABS IN CHART   Subjective:        Kristi Ibrahim is a 72 y.o. female who here for follow up    CC  FEET, LEGS SWELLING  HPI  72-year-old female has been complaining of the rash and the swelling in both lower extremities denies any chest pains tightness heaviness or the pressure sensation     Problems Addressed this Visit        Cardiac and Vasculature    Benign essential hypertension    Relevant Medications    hydroCHLOROthiazide (MICROZIDE) 12.5 MG capsule    Palpitations       Symptoms and Signs    Leg swelling - Primary      Diagnoses       Codes Comments    Leg swelling    -  Primary ICD-10-CM: M79.89  ICD-9-CM: 729.81     Palpitations     ICD-10-CM: R00.2  ICD-9-CM: 785.1     Benign essential hypertension     ICD-10-CM: I10  ICD-9-CM: 401.1         .    The following portions of the patient's history were reviewed and updated as appropriate: allergies, current medications, past family history, past medical history, past social history, past surgical history and problem list.    Past Medical History:   Diagnosis Date   • Allergic    • Anemia, unspecified    • Arthritis    • Asthma     \"ALLERGIC\"   • Benign essential hypertension    • Diverticulosis    • Fibromyalgia    • Fibromyalgia, primary    • GERD (gastroesophageal reflux disease)    • H/O degenerative disc disease    • Headache    • 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   • Hepatitis A    • HL (hearing loss)    • Leg pain    • Leg swelling    • Leg weakness, bilateral 09/10/2019   • Low back pain 08/25/2020   • Lumbar herniated disc    • Lumbar stenosis 09/10/2019   • Mitral valve insufficiency    • Muscle cramps    • Parkinson's disease (HCC)    • Scoliosis    • Seasonal allergic rhinitis    • Shortness of breath    • Sinus infection 03/31/2022   • Spondylolisthesis of cervical region 09/10/2019   • Spondylolisthesis of lumbar region 08/25/2020   • Tricuspid regurgitation    • Voice hoarseness      reports " "that she has never smoked. She has never used smokeless tobacco. She reports that she does not drink alcohol and does not use drugs.   Family History   Problem Relation Age of Onset   • Heart disease Mother    • Stroke Mother    • Arthritis Mother    • Heart disease Father    • Hypertension Father    • Arthritis Father    • Stomach cancer Father    • Heart disease Brother    • Hypertension Brother    • Arthritis Brother    • Breast cancer Sister    • Arthritis Sister    • Cancer Maternal Grandmother    • Cancer Sister        Review of Systems  Constitutional: No wt loss, fever, fatigue  Gastrointestinal: No nausea, abdominal pain  Behavioral/Psych: No insomnia or anxiety   Cardiovascular leg swelling  Objective:       Physical Exam  /85   Pulse 70   Ht 161.3 cm (63.5\")   Wt 72.6 kg (160 lb)   BMI 27.90 kg/m²   General appearance: No acute changes   Neck: Trachea midline; NECK, supple, no thyromegaly or lymphadenopathy   Lungs: Normal size and shape, normal breath sounds, equal distribution of air, no rales and rhonchi   CV: S1-S2 regular, no murmurs, no rub, no gallop   Abdomen: Soft, nontender; no masses , no abnormal abdominal sounds   Extremities: No deformity , normal color , 1+ peripheral edema   Skin: Normal temperature, turgor and texture; no rash, ulcers            ECG 12 Lead    Date/Time: 9/29/2022 2:13 PM  Performed by: Malina Breen MD  Authorized by: Malina Breen MD   Comparison: compared with previous ECG   Similar to previous ECG  Rhythm: sinus rhythm  ST Flattening: all    Clinical impression: non-specific ECG              Echocardiogram:        Current Outpatient Medications:   •  albuterol sulfate  (90 Base) MCG/ACT inhaler, Inhale 2 puffs Every 4 (Four) Hours As Needed for Wheezing., Disp: 18 g, Rfl: 11  •  amantadine (SYMMETREL) 100 MG capsule, , Disp: , Rfl:   •  aspirin 81 MG tablet, Take 81 mg by mouth Every Other Day., Disp: , Rfl:   •  Bystolic 2.5 MG " tablet, TAKE 1 TABLET DAILY, Disp: 90 tablet, Rfl: 0  •  carbidopa-levodopa (SINEMET)  MG per tablet, Take 1.5 tablets by mouth 3 (Three) Times a Day., Disp: , Rfl:   •  cetirizine (zyrTEC) 10 MG tablet, Take 10 mg by mouth Daily., Disp: , Rfl:   •  Cholecalciferol 10 MCG/ML liquid liquid, Take 400 Units by mouth Daily., Disp: , Rfl:   •  dexamethasone (DECADRON) 0.1 % ophthalmic solution, INSTILL 1 DROP INTO RIGHT EYE 4 TIMES DAILY, Disp: , Rfl:   •  dimenhyDRINATE (DRAMAMINE) 50 MG tablet, Take 50 mg by mouth At Night As Needed for movement disorders., Disp: , Rfl:   •  famotidine (PEPCID) 40 MG tablet, , Disp: , Rfl:   •  FeroSul 325 (65 Fe) MG tablet, Take 1 tablet by mouth Daily., Disp: , Rfl:   •  fluticasone (FLOVENT HFA) 110 MCG/ACT inhaler, Inhale 2 puffs 2 (Two) Times a Day., Disp: 12 g, Rfl: 11  •  ketoconazole (NIZORAL) 2 % cream, , Disp: , Rfl:   •  lidocaine (LIDODERM) 5 %, , Disp: , Rfl:   •  magnesium oxide (MAG-OX) 400 MG tablet, Take 400 mg by mouth 2 (Two) Times a Day., Disp: , Rfl:   •  meclizine (ANTIVERT) 25 MG tablet, Take 25 mg by mouth daily., Disp: , Rfl:   •  Multiple Vitamins-Minerals (HAIR SKIN AND NAILS FORMULA PO), Take 1 tablet by mouth Daily., Disp: , Rfl:   •  ondansetron (ZOFRAN) 4 MG tablet, ondansetron HCl 4 mg tablet, Disp: , Rfl:   •  PreviDent 5000 Booster Plus 1.1 % paste, See Admin Instructions., Disp: , Rfl:   •  Probiotic Product (SOLUBLE FIBER/PROBIOTICS PO), Take 1 tablet by mouth Daily., Disp: , Rfl:   •  SUMAtriptan (IMITREX) 50 MG tablet, Take 1 tablet by mouth 1 (One) Time As Needed for Migraine for up to 1 dose. Take one tablet at onset of headache. May repeat dose one time in 2 hours if headache not relieved., Disp: 9 tablet, Rfl: 5  •  triamcinolone (KENALOG) 0.1 % cream, triamcinolone acetonide 0.1 % topical cream, Disp: , Rfl:   •  baclofen (LIORESAL) 10 MG tablet, Take 10 mg by mouth 3 (Three) Times a Day., Disp: , Rfl:   •  nitroglycerin (NITROSTAT) 0.4  MG SL tablet, Place 1 tablet under the tongue Every 5 (Five) Minutes As Needed for Chest Pain., Disp: 25 tablet, Rfl: 5   Assessment:        Patient Active Problem List   Diagnosis   • Abnormal cardiovascular stress test   • Benign essential hypertension   • Coronary arteriosclerosis in native artery   • Presence of stent in coronary artery   • SSS (sick sinus syndrome) (HCC)   • Palpitations   • Anemia, unspecified   • Arthritis   • Asthma   • Degeneration of lumbar intervertebral disc   • Left lumbar radiculopathy   • Lumbar spondylosis   • Dizziness   • Dyskinesia   • Dystonia   • GERD (gastroesophageal reflux disease)   • Hemangioma   • Hepatitis A   • Leg pain   • Leg swelling   • Leg weakness, bilateral   • Long-term current use of opiate analgesic   • Muscle cramps   • Parkinson disease (HCC)   • Seasonal allergic rhinitis   • Shortness of breath   • Spondylolisthesis of cervical region   • Migraine with aura and without status migrainosus, not intractable   • Postmenopausal   • Sinus infection   • Localized edema   • Other hyperlipidemia   • Dysuria               Plan:            ICD-10-CM ICD-9-CM   1. Leg swelling  M79.89 729.81   2. Palpitations  R00.2 785.1   3. Benign essential hypertension  I10 401.1     1. Leg swelling  Kristi Ibrahim has been complaining of the leg swelling, appears dependent pedal edema, significantly contributed with a venous insufficiency.    Strong recommendations of elevating the legs as well as using support hoses, along with the control of fluids and salt restriction has been explained in details      2. Palpitations  Under control    3. Benign essential hypertension  Under control     HCTZ 12.5 MG  TWICE A WK'   1 YR  COUNSELING:    Kristi Reed was given to patient for the following topics: diagnostic results, risk factor reductions, impressions, risks and benefits of treatment options and importance of treatment compliance .       SMOKING COUNSELING:        Dictated  using Dragon dictation

## 2022-12-07 ENCOUNTER — HOSPITAL ENCOUNTER (OUTPATIENT)
Dept: GENERAL RADIOLOGY | Facility: HOSPITAL | Age: 72
Discharge: HOME | End: 2022-12-07
Admitting: NURSE PRACTITIONER

## 2022-12-07 ENCOUNTER — OFFICE VISIT (OUTPATIENT)
Dept: FAMILY MEDICINE CLINIC | Facility: CLINIC | Age: 72
End: 2022-12-07

## 2022-12-07 VITALS
TEMPERATURE: 98.3 F | DIASTOLIC BLOOD PRESSURE: 75 MMHG | SYSTOLIC BLOOD PRESSURE: 110 MMHG | HEIGHT: 64 IN | HEART RATE: 82 BPM | BODY MASS INDEX: 26.46 KG/M2 | OXYGEN SATURATION: 98 % | WEIGHT: 155 LBS

## 2022-12-07 DIAGNOSIS — R21 RASH AND NONSPECIFIC SKIN ERUPTION: Primary | ICD-10-CM

## 2022-12-07 DIAGNOSIS — G89.29 CHRONIC LEFT-SIDED THORACIC BACK PAIN: ICD-10-CM

## 2022-12-07 DIAGNOSIS — M25.50 ARTHRALGIA, UNSPECIFIED JOINT: ICD-10-CM

## 2022-12-07 DIAGNOSIS — L50.9 URTICARIA: ICD-10-CM

## 2022-12-07 DIAGNOSIS — M54.6 CHRONIC LEFT-SIDED THORACIC BACK PAIN: ICD-10-CM

## 2022-12-07 DIAGNOSIS — L65.9 ALOPECIA: ICD-10-CM

## 2022-12-07 DIAGNOSIS — M79.10 MYALGIA: ICD-10-CM

## 2022-12-07 LAB
ASO AB SERPL-ACNC: NEGATIVE [IU]/ML
CHROMATIN AB SERPL-ACNC: <10 IU/ML (ref 0–14)
CRP SERPL-MCNC: 0.33 MG/DL (ref 0–0.5)
ERYTHROCYTE [SEDIMENTATION RATE] IN BLOOD: 12 MM/HR (ref 0–30)
FOLATE SERPL-MCNC: >20 NG/ML (ref 4.78–24.2)
T4 FREE SERPL-MCNC: 1.37 NG/DL (ref 0.93–1.7)
TSH SERPL DL<=0.05 MIU/L-ACNC: 1.53 UIU/ML (ref 0.27–4.2)
URATE SERPL-MCNC: 5 MG/DL (ref 2.4–5.7)
VIT B12 BLD-MCNC: 762 PG/ML (ref 211–946)

## 2022-12-07 PROCEDURE — 82746 ASSAY OF FOLIC ACID SERUM: CPT | Performed by: NURSE PRACTITIONER

## 2022-12-07 PROCEDURE — 86618 LYME DISEASE ANTIBODY: CPT | Performed by: NURSE PRACTITIONER

## 2022-12-07 PROCEDURE — 82607 VITAMIN B-12: CPT | Performed by: NURSE PRACTITIONER

## 2022-12-07 PROCEDURE — 86757 RICKETTSIA ANTIBODY: CPT | Performed by: NURSE PRACTITIONER

## 2022-12-07 PROCEDURE — 36415 COLL VENOUS BLD VENIPUNCTURE: CPT | Performed by: NURSE PRACTITIONER

## 2022-12-07 PROCEDURE — 86063 ANTISTREPTOLYSIN O SCREEN: CPT | Performed by: NURSE PRACTITIONER

## 2022-12-07 PROCEDURE — 85652 RBC SED RATE AUTOMATED: CPT | Performed by: NURSE PRACTITIONER

## 2022-12-07 PROCEDURE — 84439 ASSAY OF FREE THYROXINE: CPT | Performed by: NURSE PRACTITIONER

## 2022-12-07 PROCEDURE — 86038 ANTINUCLEAR ANTIBODIES: CPT | Performed by: NURSE PRACTITIONER

## 2022-12-07 PROCEDURE — 86666 EHRLICHIA ANTIBODY: CPT | Performed by: NURSE PRACTITIONER

## 2022-12-07 PROCEDURE — 86140 C-REACTIVE PROTEIN: CPT | Performed by: NURSE PRACTITIONER

## 2022-12-07 PROCEDURE — 86431 RHEUMATOID FACTOR QUANT: CPT | Performed by: NURSE PRACTITIONER

## 2022-12-07 PROCEDURE — 86225 DNA ANTIBODY NATIVE: CPT | Performed by: NURSE PRACTITIONER

## 2022-12-07 PROCEDURE — 72070 X-RAY EXAM THORAC SPINE 2VWS: CPT

## 2022-12-07 PROCEDURE — 84482 T3 REVERSE: CPT | Performed by: NURSE PRACTITIONER

## 2022-12-07 PROCEDURE — 84443 ASSAY THYROID STIM HORMONE: CPT | Performed by: NURSE PRACTITIONER

## 2022-12-07 PROCEDURE — 99214 OFFICE O/P EST MOD 30 MIN: CPT | Performed by: NURSE PRACTITIONER

## 2022-12-07 PROCEDURE — 84550 ASSAY OF BLOOD/URIC ACID: CPT | Performed by: NURSE PRACTITIONER

## 2022-12-07 RX ORDER — CEPHALEXIN 500 MG/1
500 CAPSULE ORAL 2 TIMES DAILY
COMMUNITY
Start: 2022-12-02 | End: 2023-03-13

## 2022-12-07 RX ORDER — MINOXIDIL 5 %
SOLUTION, NON-ORAL TOPICAL 2 TIMES DAILY
Qty: 60 ML | Refills: 2 | Status: SHIPPED | OUTPATIENT
Start: 2022-12-07 | End: 2023-03-13

## 2022-12-07 NOTE — PROGRESS NOTES
Answers for HPI/ROS submitted by the patient on 11/30/2022  Please describe your symptoms.: Swelling in feet & legs, bad rash on legs since May 2022.  Headaches, severe back pain in thoracic & rib area  Have you had these symptoms before?: Yes  How long have you been having these symptoms?: Greater than 2 weeks  Please describe any probable cause for these symptoms. : Do not have a clue  What is the primary reason for your visit?: Other    Chief Complaint  Rash (Pt states had the rash since May all over legs and some on the arms ), Back Pain (Pt has concerns of mid back into ribcage ), and Leg Swelling (Bilateral leg feet swelling )    Subjective         Kristi Ibrahim presents to [facility]  HPI     Rash-on both legs, bright red and puffs up, feet and ankles also swelling with rash.  its been there since May 14 2022, has seen dermatologist, Dr Taylor and Dr Ramirez, vein doctor in [place] and neurologist Dr. Ruperto Conley in [place].  Still no diagnosis.    Thoracic back pain-starts in the middle and radiates around to the left rib cage.  It gets intense at times , knife going through back.  Its been getting worse after a fall. Has a history of scoliosis.    Has restless legs syndrome at night-uses OTC Restfull Leg seems to be working well right now.    Complain of Hair loss-has been worse since COVID vaccine, using OTC products.  Taking Hair Skin and Nail vitamins.    PHQ-2 Total Score:    PHQ-9 Total Score:      Review of Systems    Social History     Socioeconomic History   • Marital status:    Tobacco Use   • Smoking status: Never   • Smokeless tobacco: Never   Vaping Use   • Vaping Use: Never used   Substance and Sexual Activity   • Alcohol use: Never   • Drug use: Never   • Sexual activity: Defer     Birth control/protection: Post-menopausal        Objective     Vitals:    12/07/22 1046   BP: 110/75   Pulse: 82   Temp: 98.3 °F (36.8 °C)   SpO2: 98%   Weight:  "70.3 kg (155 lb)   Height: 161.3 cm (63.5\")        Body mass index is 27.03 kg/m².    Wt Readings from Last 3 Encounters:   12/07/22 70.3 kg (155 lb)   09/29/22 72.6 kg (160 lb)   08/01/22 74.8 kg (165 lb)       BP Readings from Last 3 Encounters:   12/07/22 110/75   09/29/22 128/85   08/01/22 120/94                 Physical Exam  Vitals reviewed.   Constitutional:       Appearance: Normal appearance.   HENT:      Head: Normocephalic and atraumatic.   Eyes:      Conjunctiva/sclera: Conjunctivae normal.      Pupils: Pupils are equal, round, and reactive to light.   Cardiovascular:      Rate and Rhythm: Normal rate and regular rhythm.      Pulses: Normal pulses.      Heart sounds: Normal heart sounds.   Pulmonary:      Effort: Pulmonary effort is normal.      Breath sounds: Normal breath sounds.   Abdominal:      General: Bowel sounds are normal.      Palpations: Abdomen is soft.   Musculoskeletal:      Cervical back: Normal range of motion.      Right lower leg: No edema.      Left lower leg: No edema.   Skin:     General: Skin is warm and dry.      Findings: Rash present. Rash is macular, papular and urticarial.      Comments: Fine macular papular rash below knees, red slightly raised in areas.   Neurological:      Mental Status: She is alert and oriented to person, place, and time.   Psychiatric:         Mood and Affect: Mood normal.         Behavior: Behavior normal.         Thought Content: Thought content normal.         Judgment: Judgment normal.          Result Review :   The following data was reviewed by: BASIM Kinney on 12/07/2022:      Procedures    Assessment and Plan   Diagnoses and all orders for this visit:    1. Rash and nonspecific skin eruption (Primary)  -     Ambulatory Referral to Rheumatology  -     C-reactive Protein  -     Uric Acid  -     Rheumatoid Factor; Future  -     FAY; Future  -     Sedimentation Rate; Future  -     Antistreptolysin O Screen; Future  -     Lyme Disease Total " Antibody With Reflex to Immunoassay  -     General acute hospital (IgG / M)  -     Ehrlichia Antibody Panel  -     Antistreptolysin O Screen  -     Sedimentation Rate  -     FAY  -     Rheumatoid Factor    2. Chronic left-sided thoracic back pain  -     Cancel: XR Spine Thoracic 2 View (In Office); Future  -     XR Spine Thoracic 2 View; Future    3. Alopecia  -     minoxidil (Minoxidil for Women) 2 % external solution; Apply  topically to the appropriate area as directed 2 (Two) Times a Day.  Dispense: 60 mL; Refill: 2  -     Vitamin B12  -     Folate  -     TSH  -     T4, Free  -     T3, Reverse    4. Arthralgia, unspecified joint  -     Ambulatory Referral to Rheumatology  -     C-reactive Protein  -     Uric Acid  -     Rheumatoid Factor; Future  -     FAY; Future  -     Sedimentation Rate; Future  -     Antistreptolysin O Screen; Future  -     Vitamin B12  -     Folate  -     Lyme Disease Total Antibody With Reflex to Immunoassay  -     General acute hospital (IgG / M)  -     Ehrlichia Antibody Panel  -     Antistreptolysin O Screen  -     Sedimentation Rate  -     FAY  -     Rheumatoid Factor    5. Myalgia  -     Ambulatory Referral to Rheumatology  -     C-reactive Protein  -     Uric Acid  -     Rheumatoid Factor; Future  -     Folate  -     TSH  -     T4, Free  -     T3, Reverse  -     Rheumatoid Factor    6. Urticaria  -     C-reactive Protein  -     Uric Acid  -     Rheumatoid Factor; Future  -     FAY; Future  -     Sedimentation Rate; Future  -     Antistreptolysin O Screen; Future  -     Antistreptolysin O Screen  -     Sedimentation Rate  -     FAY  -     Rheumatoid Factor            Follow Up   Return in about 3 months (around 3/7/2023).  Patient was given instructions and counseling regarding her condition or for health maintenance advice. Please see specific information pulled into the AVS if appropriate.     Please note that portions of this note were completed with a voice recognition  program.    Electronically signed by Pamela Wyman, BASIM  12/07/2022, 13:13 EST

## 2022-12-08 LAB — B BURGDOR IGG+IGM SER QL IA: NEGATIVE

## 2022-12-09 LAB
A PHAGOCYTOPH IGG TITR SER IF: NEGATIVE {TITER}
A PHAGOCYTOPH IGM TITR SER IF: NEGATIVE {TITER}
DSDNA IGG SERPL IA-ACNC: NEGATIVE [IU]/ML
E CHAFFEENSIS IGG TITR SER IF: NEGATIVE {TITER}
E CHAFFEENSIS IGM TITR SER IF: NEGATIVE {TITER}
NUCLEAR IGG SER IA-RTO: NEGATIVE

## 2022-12-12 LAB
R RICKETTSI IGG SER QL IA: POSITIVE
R RICKETTSI IGG TITR SER IF: NORMAL {TITER}
R RICKETTSI IGM SER-ACNC: 1.29 INDEX (ref 0–0.89)

## 2022-12-13 LAB — T3REVERSE SERPL-MCNC: 21.5 NG/DL (ref 9.2–24.1)

## 2022-12-13 RX ORDER — DOXYCYCLINE HYCLATE 100 MG/1
100 CAPSULE ORAL 2 TIMES DAILY
Qty: 20 CAPSULE | Refills: 0 | Status: SHIPPED | OUTPATIENT
Start: 2022-12-13 | End: 2023-03-13

## 2023-01-09 VITALS
OXYGEN SATURATION: 96 % | HEART RATE: 60 BPM | RESPIRATION RATE: 12 BRPM | SYSTOLIC BLOOD PRESSURE: 161 MMHG | TEMPERATURE: 98.1 F | WEIGHT: 150 LBS | RESPIRATION RATE: 15 BRPM | DIASTOLIC BLOOD PRESSURE: 49 MMHG | SYSTOLIC BLOOD PRESSURE: 103 MMHG | SYSTOLIC BLOOD PRESSURE: 102 MMHG | DIASTOLIC BLOOD PRESSURE: 76 MMHG | RESPIRATION RATE: 18 BRPM | DIASTOLIC BLOOD PRESSURE: 62 MMHG | SYSTOLIC BLOOD PRESSURE: 96 MMHG | RESPIRATION RATE: 17 BRPM | HEART RATE: 76 BPM | HEART RATE: 82 BPM | HEART RATE: 72 BPM | DIASTOLIC BLOOD PRESSURE: 48 MMHG | SYSTOLIC BLOOD PRESSURE: 107 MMHG | SYSTOLIC BLOOD PRESSURE: 152 MMHG | RESPIRATION RATE: 11 BRPM | SYSTOLIC BLOOD PRESSURE: 95 MMHG | TEMPERATURE: 97.1 F | HEIGHT: 63 IN | OXYGEN SATURATION: 100 % | SYSTOLIC BLOOD PRESSURE: 138 MMHG | DIASTOLIC BLOOD PRESSURE: 73 MMHG | SYSTOLIC BLOOD PRESSURE: 124 MMHG | RESPIRATION RATE: 14 BRPM | SYSTOLIC BLOOD PRESSURE: 153 MMHG | RESPIRATION RATE: 16 BRPM | DIASTOLIC BLOOD PRESSURE: 81 MMHG | DIASTOLIC BLOOD PRESSURE: 75 MMHG | OXYGEN SATURATION: 99 % | HEART RATE: 67 BPM | DIASTOLIC BLOOD PRESSURE: 131 MMHG | OXYGEN SATURATION: 97 % | HEART RATE: 69 BPM | OXYGEN SATURATION: 94 % | DIASTOLIC BLOOD PRESSURE: 59 MMHG | DIASTOLIC BLOOD PRESSURE: 55 MMHG | DIASTOLIC BLOOD PRESSURE: 69 MMHG | HEART RATE: 64 BPM | HEART RATE: 70 BPM | SYSTOLIC BLOOD PRESSURE: 88 MMHG | HEART RATE: 68 BPM | RESPIRATION RATE: 13 BRPM

## 2023-01-11 ENCOUNTER — AMBULATORY SURGICAL CENTER (AMBULATORY)
Dept: URBAN - METROPOLITAN AREA SURGERY 17 | Facility: SURGERY | Age: 73
End: 2023-01-11
Payer: COMMERCIAL

## 2023-01-11 ENCOUNTER — OFFICE (AMBULATORY)
Dept: URBAN - METROPOLITAN AREA PATHOLOGY 4 | Facility: PATHOLOGY | Age: 73
End: 2023-01-11
Payer: COMMERCIAL

## 2023-01-11 DIAGNOSIS — K63.5 POLYP OF COLON: ICD-10-CM

## 2023-01-11 DIAGNOSIS — Z86.010 PERSONAL HISTORY OF COLONIC POLYPS: ICD-10-CM

## 2023-01-11 DIAGNOSIS — K57.30 DIVERTICULOSIS OF LARGE INTESTINE WITHOUT PERFORATION OR ABS: ICD-10-CM

## 2023-01-11 DIAGNOSIS — D12.3 BENIGN NEOPLASM OF TRANSVERSE COLON: ICD-10-CM

## 2023-01-11 LAB
GI HISTOLOGY: A. UNSPECIFIED: (no result)
GI HISTOLOGY: PDF REPORT: (no result)

## 2023-01-11 PROCEDURE — 45385 COLONOSCOPY W/LESION REMOVAL: CPT | Mod: PT | Performed by: INTERNAL MEDICINE

## 2023-01-11 PROCEDURE — 88305 TISSUE EXAM BY PATHOLOGIST: CPT | Performed by: INTERNAL MEDICINE

## 2023-02-13 RX ORDER — NEBIVOLOL 2.5 MG/1
TABLET ORAL
Qty: 90 TABLET | Refills: 1 | Status: SHIPPED | OUTPATIENT
Start: 2023-02-13

## 2023-02-16 DIAGNOSIS — R94.39 ABNORMAL CARDIOVASCULAR STRESS TEST: ICD-10-CM

## 2023-02-16 DIAGNOSIS — I25.10 CORONARY ARTERIOSCLEROSIS IN NATIVE ARTERY: ICD-10-CM

## 2023-02-16 DIAGNOSIS — I50.9 HEART FAILURE, UNSPECIFIED HF CHRONICITY, UNSPECIFIED HEART FAILURE TYPE: Primary | ICD-10-CM

## 2023-02-16 DIAGNOSIS — Z95.5 PRESENCE OF STENT IN CORONARY ARTERY: ICD-10-CM

## 2023-03-13 ENCOUNTER — OFFICE VISIT (OUTPATIENT)
Dept: FAMILY MEDICINE CLINIC | Facility: CLINIC | Age: 73
End: 2023-03-13
Payer: MEDICARE

## 2023-03-13 ENCOUNTER — OFFICE VISIT (OUTPATIENT)
Dept: OTOLARYNGOLOGY | Facility: CLINIC | Age: 73
End: 2023-03-13
Payer: MEDICARE

## 2023-03-13 VITALS
OXYGEN SATURATION: 100 % | HEART RATE: 88 BPM | BODY MASS INDEX: 25.27 KG/M2 | HEIGHT: 64 IN | SYSTOLIC BLOOD PRESSURE: 134 MMHG | DIASTOLIC BLOOD PRESSURE: 82 MMHG | WEIGHT: 148 LBS

## 2023-03-13 VITALS
WEIGHT: 153 LBS | HEART RATE: 85 BPM | TEMPERATURE: 98.2 F | SYSTOLIC BLOOD PRESSURE: 137 MMHG | BODY MASS INDEX: 26.12 KG/M2 | HEIGHT: 64 IN | DIASTOLIC BLOOD PRESSURE: 80 MMHG

## 2023-03-13 DIAGNOSIS — L65.9 ALOPECIA: ICD-10-CM

## 2023-03-13 DIAGNOSIS — I10 PRIMARY HYPERTENSION: Primary | ICD-10-CM

## 2023-03-13 DIAGNOSIS — G20 PARKINSON'S DISEASE: ICD-10-CM

## 2023-03-13 DIAGNOSIS — K11.7 XEROSTOMIA: ICD-10-CM

## 2023-03-13 DIAGNOSIS — R13.10 DYSPHAGIA, UNSPECIFIED TYPE: Primary | ICD-10-CM

## 2023-03-13 DIAGNOSIS — G25.81 RESTLESS LEG SYNDROME: ICD-10-CM

## 2023-03-13 PROCEDURE — 99213 OFFICE O/P EST LOW 20 MIN: CPT | Performed by: OTOLARYNGOLOGY

## 2023-03-13 PROCEDURE — 99213 OFFICE O/P EST LOW 20 MIN: CPT | Performed by: NURSE PRACTITIONER

## 2023-03-13 RX ORDER — ROPINIROLE 1 MG/1
TABLET, FILM COATED ORAL
Qty: 60 TABLET | Refills: 1 | Status: SHIPPED | OUTPATIENT
Start: 2023-03-13

## 2023-03-13 RX ORDER — DAPSONE 25 MG/1
1 TABLET ORAL EVERY 12 HOURS SCHEDULED
COMMUNITY
Start: 2023-01-01 | End: 2023-03-13 | Stop reason: ALTCHOICE

## 2023-03-13 RX ORDER — BACLOFEN 5 MG/1
TABLET ORAL
COMMUNITY
Start: 2023-02-01 | End: 2023-03-13 | Stop reason: ALTCHOICE

## 2023-03-13 RX ORDER — FERROUS SULFATE 325(65) MG
1 TABLET ORAL EVERY 24 HOURS
COMMUNITY
End: 2023-04-07

## 2023-03-13 RX ORDER — COLCHICINE 0.6 MG/1
1 TABLET ORAL DAILY
COMMUNITY
Start: 2023-02-09

## 2023-03-13 NOTE — PROGRESS NOTES
Patient Name: Kristi Ibrahim   Visit Date: 03/13/2023   Patient ID: [id number]  Provider: Jameson Lira MD    Sex: female  Location: [facility]   YOB: 1950  Location Address: [address] [place] [address], [address],?[place]?[id number]    Primary Care Provider Kamron Taylor DO  Location Phone: [phone]    Referring Provider: No ref. provider found        Chief Complaint  Difficulty Swallowing    History of Present Illness  Kristi Ibrahim is a 72 y.o. female with past medical history significant for hyperlipidemia, spinal stenosis, asthma, and Parkinson's disease who returns today for follow-up of hoarseness.  She was originally seen on 11/6/2019 at which time she reported constant hoarseness as well as a nonproductive cough which has been present for years.  Examination that day revealed evidence of reflux and hyperfunctionality when she was placed on twice daily ranitidine.  She also has a diagnosis of Parkinson's disease but this was relatively new at the time.  She was then seen on 8/28/2020  where her hoarseness was stable.  We discussed hyperfunctional dysphonia and she elected to defer speech therapy.  She was last seen on 1/28/2022 at which time she was experiencing more difficulty with hoarseness.  She denied any issues with dysphagia but was experiencing xerostomia and reflux for which she was on famotidine.    She returns today for follow-up.  She tells me that over the past few months she has had difficulty swallowing where she feels as though food hangs up in her throat.  She points to the area of her thyroid cartilage when asked where she experiences the sensation.  It seems to affect her when eating bread, potatoes, or chicken.  Often times during these episodes she has difficulty breathing and has to cough it up or swallow it down with water for this to resolve.  She does have some difficulty with xerostomia but constantly sip some water throughout the day.   "Her sinemet has been decreased since her last visit. She denies any coughing when swallowing water.  She is not experiencing any sore throat or change in her voice.      Past Medical History:   Diagnosis Date   • Allergic    • Anemia, unspecified    • Arthritis    • Asthma     \"ALLERGIC\"   • Benign essential hypertension    • Dental disease    • Diverticulosis    • Dizziness    • Fibromyalgia    • Fibromyalgia, primary    • GERD (gastroesophageal reflux disease)    • H/O degenerative disc disease    • Headache    • 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   • Hepatitis A    • HL (hearing loss)    • Leg pain    • Leg swelling    • Leg weakness, bilateral 09/10/2019   • Low back pain 08/25/2020   • Lumbar herniated disc    • Lumbar stenosis 09/10/2019   • Mitral valve insufficiency    • Muscle cramps    • Parkinson's disease (HCC)    • Scoliosis    • Seasonal allergic rhinitis    • Shortness of breath    • Sinus infection 03/31/2022   • Spondylolisthesis of cervical region 09/10/2019   • Spondylolisthesis of lumbar region 08/25/2020   • Tricuspid regurgitation    • Voice hoarseness        Past Surgical History:   Procedure Laterality Date   • ADENOIDECTOMY     • BREAST SURGERY  Multiple cysts removed   • CARDIAC CATHETERIZATION     • CARDIAC CATHETERIZATION N/A 04/23/2018    Procedure: Left Heart Cath;  Surgeon: Malina Breen MD;  Location:  [facility];  Service: Cardiovascular   • CARDIAC CATHETERIZATION N/A 04/23/2018    Procedure: Coronary angiography;  Surgeon: Malina Breen MD;  Location:  [facility];  Service: Cardiovascular   • CARDIAC CATHETERIZATION N/A 04/23/2018    Procedure: Left ventriculography;  Surgeon: Malina Breen MD;  Location:  [facility];  Service: Cardiovascular   • CARPAL TUNNEL RELEASE Bilateral    • CHOLECYSTECTOMY     • COLONOSCOPY     • CORONARY ANGIOPLASTY     • CORONARY STENT PLACEMENT     • EYE SURGERY  Cateracts "   • FOOT SURGERY     • HAND SURGERY     • KNEE ARTHROSCOPY Left    • OTHER SURGICAL HISTORY      JOINT SURGERY   • RIB BIOPSY, RIB RESECTION     • SPINAL FUSION     • TONSILLECTOMY AND ADENOIDECTOMY     • TUBAL ABDOMINAL LIGATION           Current Outpatient Medications:   •  albuterol sulfate  (90 Base) MCG/ACT inhaler, Inhale 2 puffs Every 4 (Four) Hours As Needed for Wheezing., Disp: 18 g, Rfl: 11  •  amantadine (SYMMETREL) 100 MG capsule, , Disp: , Rfl:   •  aspirin 81 MG tablet, Take 1 tablet by mouth Every Other Day., Disp: , Rfl:   •  baclofen (LIORESAL) 10 MG tablet, Take 1 tablet by mouth 3 (Three) Times a Day., Disp: , Rfl:   •  carbidopa-levodopa (SINEMET)  MG per tablet, Take 1.5 tablets by mouth 3 (Three) Times a Day., Disp: , Rfl:   •  cetirizine (zyrTEC) 10 MG tablet, Take 1 tablet by mouth Daily., Disp: , Rfl:   •  Cholecalciferol 10 MCG/ML liquid liquid, Take 1 mL by mouth Daily., Disp: , Rfl:   •  colchicine 0.6 MG tablet, Take 1 tablet by mouth Daily., Disp: , Rfl:   •  dimenhyDRINATE (DRAMAMINE) 50 MG tablet, Take 1 tablet by mouth At Night As Needed for Movement Disorders., Disp: , Rfl:   •  ferrous sulfate 325 (65 FE) MG tablet, Take 1 tablet by mouth Daily., Disp: , Rfl:   •  fluticasone (FLOVENT HFA) 110 MCG/ACT inhaler, Inhale 2 puffs 2 (Two) Times a Day., Disp: 12 g, Rfl: 11  •  hydroCHLOROthiazide (MICROZIDE) 12.5 MG capsule, Take 1 capsule by mouth Daily., Disp: 30 capsule, Rfl: 11  •  lidocaine (LIDODERM) 5 %, , Disp: , Rfl:   •  magnesium oxide (MAG-OX) 400 MG tablet, Take 1 tablet by mouth 2 (Two) Times a Day., Disp: , Rfl:   •  meclizine (ANTIVERT) 25 MG tablet, Take 1 tablet by mouth Daily., Disp: , Rfl:   •  Multiple Vitamins-Minerals (HAIR SKIN AND NAILS FORMULA PO), Take 1 tablet by mouth Daily., Disp: , Rfl:   •  nebivolol (BYSTOLIC) 2.5 MG tablet, TAKE 1 TABLET DAILY, Disp: 90 tablet, Rfl: 1  •  nitroglycerin (NITROSTAT) 0.4 MG SL tablet, Place 1 tablet under the  "tongue Every 5 (Five) Minutes As Needed for Chest Pain., Disp: 25 tablet, Rfl: 5  •  ondansetron (ZOFRAN) 4 MG tablet, ondansetron HCl 4 mg tablet, Disp: , Rfl:   •  Probiotic Product (SOLUBLE FIBER/PROBIOTICS PO), Take 1 tablet by mouth Daily., Disp: , Rfl:   •  rOPINIRole (Requip) 1 MG tablet, Take 1-2 tablets 1 hour before bedtime for restless legs., Disp: 60 tablet, Rfl: 1  •  SUMAtriptan (IMITREX) 50 MG tablet, Take 1 tablet by mouth 1 (One) Time As Needed for Migraine for up to 1 dose. Take one tablet at onset of headache. May repeat dose one time in 2 hours if headache not relieved., Disp: 9 tablet, Rfl: 5     Allergies   Allergen Reactions   • Nickel Rash   • Rapeseed [Brassica Napus Seed Oil] Hives     Had to go to ER for treatment.   • Ace Inhibitors Unknown (See Comments)     NOT  SURE   • Acetaminophen Swelling   • Brompheniramine Unknown (See Comments)     NOT SURE   • Codeine Nausea And Vomiting   • Diclofenac Urinary Retention     GI PAIN - ULCERS???   • Flurbiprofen Unknown (See Comments)     NOT SURE   • Ibuprofen Unknown (See Comments)     NOT SURE   • Indocyanine Green Unknown (See Comments)     NOT SURE   • Pantoprazole Nausea And Vomiting   • Propoxyphene Unknown (See Comments)     NOT SURE   • Iodinated Contrast Media Rash       Social History     Tobacco Use   • Smoking status: Never   • Smokeless tobacco: Never   Vaping Use   • Vaping Use: Never used   Substance Use Topics   • Alcohol use: Never   • Drug use: Never        Objective     Vital Signs:   /80   Pulse 85   Temp 98.2 °F (36.8 °C) (Temporal)   Ht 161.3 cm (63.5\")   Wt 69.4 kg (153 lb)   BMI 26.68 kg/m²       Physical Exam    General: Well developed, well nourished patient of stated age in no acute distress. Voice is strong and clear.   Head: Normocephalic and atraumatic.  Face: No lesions.  Bilateral parotid and submandibular glands are unremarkable.  Stensen's and Warthin's ducts are productive of clear saliva " bilaterally.  House-Brackmann I/VI     bilaterally.   muscles and temporomandibular joint nontender to palpation.  No TMJ crepitus.  Eyes: PERRLA, sclerae anicteric, no conjunctival injection. Extra ocular movements are intact and full. No nystagmus.   Ears: Auricles are normal in appearance. Bilateral external auditory canals are unremarkable. Bilateral tympanic membranes are clear and without effusion. Hearing normal to conversational voice.   Nose: External nose is normal in appearance. Bilateral nares are patent with normal appearing mucosa. Septum midline. Turbinates are unremarkable. No lesions.   Oral Cavity: Lips are normal in appearance. Oral mucosa is unremarkable. Gingiva is unremarkable. Normal dentition for age. Tongue is unremarkable with good movement. Hard palate is unremarkable.   Oropharynx: Soft palate is unremarkable with full movement. Uvula is unremarkable. Bilateral tonsils are unremarkable. Posterior oropharynx is unremarkable.    Larynx and hypopharynx: Deferred secondary to gag reflex.  Neck: Supple.  No mass.  Nontender to palpation.  Trachea midline. Thyroid normal size and without nodules to palpation.   Lymphatic: No lymphadenopathy upon palpation.   Psychiatric: Appropriate affect, cooperative   Neurologic: Oriented x 3, strength symmetric in all extremities, Cranial Nerves II-XII are grossly intact to confrontation   Skin: Warm and dry. No rashes.    Procedures       Result Review :               Assessment and Plan    Diagnoses and all orders for this visit:    1. Dysphagia, unspecified type (Primary)  -     FL Video Swallow With Speech Single Contrast; Future    2. Parkinson's disease (HCC)    3. Xerostomia    Impressions and findings were discussed.  Currently, she is seen for evaluation of intermittent dysphagia to solids causing difficulty with breathing.  We discussed the differential including cricopharyngeal dysphagia.  Options for further evaluation and management  were discussed and we will pursue a modified barium swallow study.  She was given ample time to ask questions, all of which were answered to her satisfaction.  We will discuss the results over the phone as well as options for further management.        Follow Up   No follow-ups on file.  Patient was given instructions and counseling regarding her condition or for health maintenance advice. Please see specific information pulled into the AVS if appropriate.

## 2023-03-13 NOTE — PROGRESS NOTES
"Chief Complaint  Hypertension and Leg Pain (Soreness, \"Like they have knots in them\" )    Subjective          Kristi Ibrahim is a 72 y.o. female who presents to [facility]    History of Present Illness    HTN-controlled with HCTZ and Bystolic  Seeing Dr Mendoza Podiatrist for left foot pain and bunionectomy.  She states he is wanting to take out the  plate and screws in left foot because he thinks she may be having a reaction to it.   Also states her legs are waking her up at night from uncontrollable moving.    PHQ-2 Total Score:     PHQ-9 Total Score:          Review of Systems       Medical History: has a past medical history of Allergic, Anemia, unspecified, Arthritis, Asthma, Benign essential hypertension, Dental disease, Diverticulosis, Dizziness, Fibromyalgia, Fibromyalgia, primary, GERD (gastroesophageal reflux disease), H/O degenerative disc disease, Headache, Hemangioma of bone (09/10/2019), Hepatitis A, HL (hearing loss), Leg pain, Leg swelling, Leg weakness, bilateral (09/10/2019), Low back pain (08/25/2020), Lumbar herniated disc, Lumbar stenosis (09/10/2019), Mitral valve insufficiency, Muscle cramps, Parkinson's disease (HCC), Scoliosis, Seasonal allergic rhinitis, Shortness of breath, Sinus infection (03/31/2022), Spondylolisthesis of cervical region (09/10/2019), Spondylolisthesis of lumbar region (08/25/2020), Tricuspid regurgitation, and Voice hoarseness.     Surgical History: has a past surgical history that includes Cardiac catheterization; Coronary angioplasty; Tubal ligation; Tonsillectomy and adenoidectomy; rib biopsy, rib resection; Carpal tunnel release (Bilateral); Knee Arthroscopy (Left); Cardiac catheterization (N/A, 04/23/2018); Cardiac catheterization (N/A, 04/23/2018); Cardiac catheterization (N/A, 04/23/2018); Spinal fusion; Cholecystectomy; Colonoscopy; Hand surgery; Other surgical history; Foot surgery; Adenoidectomy; Coronary stent placement; Breast " surgery (Multiple cysts removed); and Eye surgery ([employer]).     Family History: family history includes Arthritis in her brother, father, mother, and sister; Breast cancer in her sister; Cancer in her maternal grandmother, sister, and sister; Diabetes in her brother; Heart disease in her brother, father, and mother; Hypertension in her brother, brother, and father; Osteoarthritis in her mother; Rheum arthritis in her father; Stomach cancer in her father; Stroke in her mother.     Social History: reports that she has never smoked. She has never used smokeless tobacco. She reports that she does not drink alcohol and does not use drugs.    Allergies: Nickel, Rapeseed [brassica napus seed oil], Ace inhibitors, Acetaminophen, Brompheniramine, Codeine, Diclofenac, Flurbiprofen, Ibuprofen, Indocyanine green, Pantoprazole, Propoxyphene, and Iodinated contrast media      Health Maintenance Due   Topic Date Due   • TDAP/TD VACCINES (1 - Tdap) Never done   • ZOSTER VACCINE (1 of 2) Never done   • HEPATITIS C SCREENING  Never done   • ANNUAL WELLNESS VISIT  Never done   • PAP SMEAR  Never done   • Pneumococcal Vaccine 65+ (2 - PPSV23 if available, else PCV20) 09/21/2018   • COVID-19 Vaccine (2 - Moderna series) 12/03/2021            Current Outpatient Medications:   •  albuterol sulfate  (90 Base) MCG/ACT inhaler, Inhale 2 puffs Every 4 (Four) Hours As Needed for Wheezing., Disp: 18 g, Rfl: 11  •  amantadine (SYMMETREL) 100 MG capsule, , Disp: , Rfl:   •  aspirin 81 MG tablet, Take 1 tablet by mouth Every Other Day., Disp: , Rfl:   •  baclofen (LIORESAL) 10 MG tablet, Take 1 tablet by mouth 3 (Three) Times a Day., Disp: , Rfl:   •  carbidopa-levodopa (SINEMET)  MG per tablet, Take 1.5 tablets by mouth 3 (Three) Times a Day., Disp: , Rfl:   •  cetirizine (zyrTEC) 10 MG tablet, Take 1 tablet by mouth Daily., Disp: , Rfl:   •  Cholecalciferol 10 MCG/ML liquid liquid, Take 1 mL by mouth Daily., Disp: , Rfl:   •   colchicine 0.6 MG tablet, Take 1 tablet by mouth Daily., Disp: , Rfl:   •  dimenhyDRINATE (DRAMAMINE) 50 MG tablet, Take 1 tablet by mouth At Night As Needed for Movement Disorders., Disp: , Rfl:   •  ferrous sulfate 325 (65 FE) MG tablet, Take 1 tablet by mouth Daily., Disp: , Rfl:   •  fluticasone (FLOVENT HFA) 110 MCG/ACT inhaler, Inhale 2 puffs 2 (Two) Times a Day., Disp: 12 g, Rfl: 11  •  hydroCHLOROthiazide (MICROZIDE) 12.5 MG capsule, Take 1 capsule by mouth Daily., Disp: 30 capsule, Rfl: 11  •  lidocaine (LIDODERM) 5 %, , Disp: , Rfl:   •  magnesium oxide (MAG-OX) 400 MG tablet, Take 1 tablet by mouth 2 (Two) Times a Day., Disp: , Rfl:   •  meclizine (ANTIVERT) 25 MG tablet, Take 1 tablet by mouth Daily., Disp: , Rfl:   •  Multiple Vitamins-Minerals (HAIR SKIN AND NAILS FORMULA PO), Take 1 tablet by mouth Daily., Disp: , Rfl:   •  nebivolol (BYSTOLIC) 2.5 MG tablet, TAKE 1 TABLET DAILY, Disp: 90 tablet, Rfl: 1  •  nitroglycerin (NITROSTAT) 0.4 MG SL tablet, Place 1 tablet under the tongue Every 5 (Five) Minutes As Needed for Chest Pain., Disp: 25 tablet, Rfl: 5  •  ondansetron (ZOFRAN) 4 MG tablet, ondansetron HCl 4 mg tablet, Disp: , Rfl:   •  Probiotic Product (SOLUBLE FIBER/PROBIOTICS PO), Take 1 tablet by mouth Daily., Disp: , Rfl:   •  SUMAtriptan (IMITREX) 50 MG tablet, Take 1 tablet by mouth 1 (One) Time As Needed for Migraine for up to 1 dose. Take one tablet at onset of headache. May repeat dose one time in 2 hours if headache not relieved., Disp: 9 tablet, Rfl: 5  •  rOPINIRole (Requip) 1 MG tablet, Take 1-2 tablets 1 hour before bedtime for restless legs., Disp: 60 tablet, Rfl: 1      Immunization History   Administered Date(s) Administered   • COVID-19 (MODERNA) 1st, 2nd, 3rd Dose Only 11/05/2021   • Fluad Quad 65+ 11/08/2022   • Fluzone High Dose =>65 Years ([employer] ONLY) 10/16/2019   • Fluzone High-Dose 65+yrs 09/02/2020, 10/08/2021, 10/19/2022   • Influenza, Unspecified 09/02/2020, 10/19/2022  "  • Pneumococcal Conjugate 13-Valent (PCV13) 09/21/2017, 09/21/2017         Objective       Vitals:    03/13/23 1124   BP: 134/82   BP Location: Left arm   Patient Position: Sitting   Cuff Size: Adult   Pulse: 88   SpO2: 100%   Weight: 67.1 kg (148 lb)   Height: 161.3 cm (63.5\")   PainSc:   4   PainLoc: Leg      Body mass index is 25.81 kg/m².   Wt Readings from Last 3 Encounters:   03/13/23 67.1 kg (148 lb)   12/07/22 70.3 kg (155 lb)   09/29/22 72.6 kg (160 lb)      BP Readings from Last 3 Encounters:   03/13/23 134/82   12/07/22 110/75   09/29/22 128/85        Physical Exam  Vitals reviewed.   Constitutional:       Appearance: Normal appearance.   HENT:      Head: Normocephalic and atraumatic.   Eyes:      Conjunctiva/sclera: Conjunctivae normal.      Pupils: Pupils are equal, round, and reactive to light.   Cardiovascular:      Rate and Rhythm: Normal rate and regular rhythm.      Pulses: Normal pulses.      Heart sounds: Normal heart sounds.   Pulmonary:      Effort: Pulmonary effort is normal.      Breath sounds: Normal breath sounds.   Skin:     General: Skin is warm and dry.   Neurological:      Mental Status: She is alert and oriented to person, place, and time.   Psychiatric:         Mood and Affect: Mood normal.         Behavior: Behavior normal.         Thought Content: Thought content normal.         Judgment: Judgment normal.             Result Review :       Common labs    Common Labs 8/1/22 8/1/22 12/7/22    1453 1453    Glucose 102 (A)     BUN 11     Creatinine 0.74     Sodium 140     Potassium 4.5     Chloride 101     Calcium 9.8     Albumin 4.60     Total Bilirubin 0.5     Alkaline Phosphatase 72     AST (SGOT) 26     ALT (SGPT) 15     Total Cholesterol  206 (A)    Triglycerides  118    HDL Cholesterol  75 (A)    LDL Cholesterol   110 (A)    Uric Acid   5.0   (A) Abnormal value                             Assessment and Plan        Diagnoses and all orders for this visit:    1. Primary " hypertension (Primary)  Assessment & Plan:  Hypertension is improving with treatment.  Continue current treatment regimen.  Blood pressure will be reassessed at the next regular appointment.      2. Alopecia  Assessment & Plan:  Improving with supplement Regrowth.      3. Restless leg syndrome  -     rOPINIRole (Requip) 1 MG tablet; Take 1-2 tablets 1 hour before bedtime for restless legs.  Dispense: 60 tablet; Refill: 1        Follow Up     Return for Follow up with Dr Taylor.    Patient was given instructions and counseling regarding her condition or for health maintenance advice. Please see specific information pulled into the AVS if appropriate.     BASIM Kinney    Answers for HPI/ROS submitted by the patient on 3/7/2023  Please describe your symptoms.: Contuing rash, has spread to upper legs and arms, Having problems staying asleep, restless legs  Have you had these symptoms before?: Yes  How long have you been having these symptoms?: Greater than 2 weeks  Please list any medications you are currently taking for this condition.: Restful legs/ over the counter  What is the primary reason for your visit?: Other

## 2023-03-23 ENCOUNTER — TELEPHONE (OUTPATIENT)
Dept: CARDIOLOGY | Facility: CLINIC | Age: 73
End: 2023-03-23
Payer: MEDICARE

## 2023-03-23 NOTE — TELEPHONE ENCOUNTER
Caller: Kristi Ibrahim    Relationship to patient: Self    Best call back number: [phone]    Patient is needing: PATIENT STATED THAT DR CHAMPION WAS FAXING OVER PAPER WORK FOR PATIENT TO HAVE CLEARANCE OF SURGERY. SHE IS REQUESTING A CALL BACK WHEN THAT'S BEEN COMPLETED.

## 2023-04-07 ENCOUNTER — OFFICE VISIT (OUTPATIENT)
Dept: CARDIOLOGY | Facility: CLINIC | Age: 73
End: 2023-04-07
Payer: MEDICARE

## 2023-04-07 ENCOUNTER — HOSPITAL ENCOUNTER (OUTPATIENT)
Dept: CARDIOLOGY | Facility: HOSPITAL | Age: 73
Discharge: HOME | End: 2023-04-07
Admitting: FAMILY MEDICINE
Payer: MEDICARE

## 2023-04-07 VITALS
HEART RATE: 73 BPM | HEIGHT: 64 IN | WEIGHT: 146 LBS | BODY MASS INDEX: 24.92 KG/M2 | SYSTOLIC BLOOD PRESSURE: 102 MMHG | DIASTOLIC BLOOD PRESSURE: 67 MMHG

## 2023-04-07 DIAGNOSIS — E78.49 OTHER HYPERLIPIDEMIA: ICD-10-CM

## 2023-04-07 DIAGNOSIS — I25.10 CORONARY ARTERIOSCLEROSIS IN NATIVE ARTERY: ICD-10-CM

## 2023-04-07 DIAGNOSIS — Z95.5 PRESENCE OF STENT IN CORONARY ARTERY: ICD-10-CM

## 2023-04-07 DIAGNOSIS — I10 PRIMARY HYPERTENSION: ICD-10-CM

## 2023-04-07 DIAGNOSIS — R94.39 ABNORMAL CARDIOVASCULAR STRESS TEST: ICD-10-CM

## 2023-04-07 DIAGNOSIS — Z01.818 PREOPERATIVE CLEARANCE: ICD-10-CM

## 2023-04-07 DIAGNOSIS — R00.2 PALPITATIONS: ICD-10-CM

## 2023-04-07 DIAGNOSIS — I25.10 CORONARY ARTERIOSCLEROSIS IN NATIVE ARTERY: Primary | ICD-10-CM

## 2023-04-07 PROCEDURE — 93306 TTE W/DOPPLER COMPLETE: CPT

## 2023-04-07 PROCEDURE — 93306 TTE W/DOPPLER COMPLETE: CPT | Performed by: SPECIALIST

## 2023-04-07 NOTE — PROGRESS NOTES
" Subjective:        Kristi Ibrahim is a 72 y.o. female who here for follow up    Chief Complaint   Patient presents with   • Procedure     Cardiac clearance for foot surgery (hardware removal)        HPI    Kristi Ibrahim is a 72-year-old female with hypertension, coronary artery disease, hyperlipidemia, GERD, Parkinson's, anemia, asthma, migraines.  She follows up in office today to obtain clearance for foot surgery.  Stress test in 2021 normal myocardial perfusion study.   Echo 2021 EF 55%, normal LV function  Holter monitor 5/18/2021 normal sinus rhythm with rare PACs PVCs,.  Frequent PVCs bigeminy and couplets.  Cardiac catheterization 2018 normal left main, normal LAD including diagonal  branches with proximal LAD stent widely patent, circumflex nondominant normal, RCA dominant with no significant atherosclerosis      The following portions of the patient's history were reviewed and updated as appropriate: allergies, current medications, past family history, past medical history, past social history, past surgical history and problem list.    Past Medical History:   Diagnosis Date   • Abnormal ECG    • Allergic    • Anemia, unspecified    • Arthritis    • Asthma     \"ALLERGIC\"   • Atrial fibrillation    • Benign essential hypertension    • Coronary artery disease    • Dental disease    • Diverticulosis    • Dizziness    • Fibromyalgia    • Fibromyalgia, primary    • GERD (gastroesophageal reflux disease)    • H/O degenerative disc disease    • Headache    • 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   • Hepatitis A    • HL (hearing loss)    • Leg pain    • Leg swelling    • Leg weakness, bilateral 09/10/2019   • Low back pain 08/25/2020   • Lumbar herniated disc    • Lumbar stenosis 09/10/2019   • Mitral valve insufficiency    • Muscle cramps    • Parkinson's disease    • Scoliosis    • Seasonal allergic rhinitis    • Shortness of breath    • Sinus infection 03/31/2022   • Spondylolisthesis of cervical region " 09/10/2019   • Spondylolisthesis of lumbar region 08/25/2020   • Tricuspid regurgitation    • Voice hoarseness          reports that she has never smoked. She has never used smokeless tobacco. She reports that she does not drink alcohol and does not use drugs.     Family History   Problem Relation Age of Onset   • Heart disease Mother    • Stroke Mother    • Arthritis Mother    • Osteoarthritis Mother    • Heart disease Father    • Hypertension Father    • Arthritis Father    • Stomach cancer Father    • Rheum arthritis Father    • Heart disease Brother    • Hypertension Brother    • Arthritis Brother    • Breast cancer Sister    • Arthritis Sister    • Cancer Sister         Breast   • Cancer Maternal Grandmother         Lung   • Cancer Sister         Breast   • Diabetes Brother    • Hypertension Brother        ROS     Review of Systems  Constitutional: No wt loss, fever, fatigue  Gastrointestinal: No nausea, abdominal pain  Behavioral/Psych: No insomnia or anxiety  Cardiovascular no chest pain or shortness of breath      Objective:           Vitals and nursing note reviewed.   Constitutional:       Appearance: Well-developed.   HENT:      Head: Normocephalic.      Right Ear: External ear normal.      Left Ear: External ear normal.   Neck:      Vascular: No JVD.   Pulmonary:      Effort: Pulmonary effort is normal. No respiratory distress.      Breath sounds: Normal breath sounds. No stridor. No rales.   Cardiovascular:      Normal rate. Regular rhythm.      No gallop.   Pulses:     Intact distal pulses.   Abdominal:      General: Bowel sounds are normal. There is no distension.      Palpations: Abdomen is soft.      Tenderness: There is no abdominal tenderness. There is no guarding.   Musculoskeletal: Normal range of motion.         General: No tenderness.      Cervical back: Normal range of motion. Skin:     General: Skin is warm.   Neurological:      Mental Status: Alert and oriented to person, place, and time.       Deep Tendon Reflexes: Reflexes are normal and symmetric.   Psychiatric:         Judgment: Judgment normal.           ECG 12 Lead    Date/Time: 4/7/2023 10:55 AM  Performed by: Brenda Suggs APRN  Authorized by: Brenda Suggs APRN   Comparison: compared with previous ECG from 9/29/2022  Similar to previous ECG  Rhythm: sinus rhythm  BPM: 73  QRS axis: left    Clinical impression: normal ECG            Interpretation Summary    • An abnormal monitor study.  • NSR RARE PACS, PVCS  • FREQUENT PVS, BIGEMINY, COUPLETS  Impression:      1. Normal left main  2. Normal LAD including the diagonal and  branches with the proximal LAD stent widely patent  3. Circumflex artery nondominant and normal  4. RCA dominant with no significant atherosclerosis  5. Normal LV gram     Recommendations:      1. With the LAD stent widely patent no significant stenosis patient be treated medically            I sincerely appreciate the opportunity to participate in your patient's care. Please feel free to contact me anytime if I can be of assistance in this or any other way.     Malina Breen MD  4/23/2018  2:14 PM     Interpretation Summary       • Findings consistent with a normal ECG stress test.  • Left ventricular ejection fraction is normal. (Calculated EF = 60%).  • Myocardial perfusion imaging indicates a normal myocardial perfusion study with no evidence of ischemia.  • Impressions are consistent with a low risk study  Interpretation Summary    • Estimated right ventricular systolic pressure from tricuspid regurgitation is normal (<35 mmHg).  • Calculated left ventricular EF = 55.8% Estimated left ventricular EF was in agreement with the calculated left ventricular EF.  • Left ventricular diastolic function was normal.  • There is no evidence of pericardial effusion. .        Current Outpatient Medications:   •  albuterol sulfate  (90 Base) MCG/ACT inhaler, Inhale 2 puffs Every 4 (Four) Hours As  Needed for Wheezing., Disp: 18 g, Rfl: 11  •  amantadine (SYMMETREL) 100 MG capsule, , Disp: , Rfl:   •  aspirin 81 MG tablet, Take 1 tablet by mouth Every Other Day., Disp: , Rfl:   •  baclofen (LIORESAL) 10 MG tablet, Take 1 tablet by mouth 3 (Three) Times a Day., Disp: , Rfl:   •  carbidopa-levodopa (SINEMET)  MG per tablet, Take 1.5 tablets by mouth 3 (Three) Times a Day., Disp: , Rfl:   •  cetirizine (zyrTEC) 10 MG tablet, Take 1 tablet by mouth Daily., Disp: , Rfl:   •  colchicine 0.6 MG tablet, Take 1 tablet by mouth Daily., Disp: , Rfl:   •  dimenhyDRINATE (DRAMAMINE) 50 MG tablet, Take 1 tablet by mouth At Night As Needed for Movement Disorders., Disp: , Rfl:   •  fluticasone (FLOVENT HFA) 110 MCG/ACT inhaler, Inhale 2 puffs 2 (Two) Times a Day., Disp: 12 g, Rfl: 11  •  hydroCHLOROthiazide (MICROZIDE) 12.5 MG capsule, Take 1 capsule by mouth Daily. (Patient taking differently: Take 1 capsule by mouth Daily. Pt only takes twice a week), Disp: 30 capsule, Rfl: 11  •  lidocaine (LIDODERM) 5 %, , Disp: , Rfl:   •  magnesium oxide (MAG-OX) 400 MG tablet, Take 1 tablet by mouth 2 (Two) Times a Day., Disp: , Rfl:   •  meclizine (ANTIVERT) 25 MG tablet, Take 1 tablet by mouth Daily., Disp: , Rfl:   •  Multiple Vitamins-Minerals (HAIR SKIN AND NAILS FORMULA PO), Take 1 tablet by mouth Daily., Disp: , Rfl:   •  nebivolol (BYSTOLIC) 2.5 MG tablet, TAKE 1 TABLET DAILY, Disp: 90 tablet, Rfl: 1  •  nitroglycerin (NITROSTAT) 0.4 MG SL tablet, Place 1 tablet under the tongue Every 5 (Five) Minutes As Needed for Chest Pain., Disp: 25 tablet, Rfl: 5  •  ondansetron (ZOFRAN) 4 MG tablet, ondansetron HCl 4 mg tablet, Disp: , Rfl:   •  Probiotic Product (SOLUBLE FIBER/PROBIOTICS PO), Take 1 tablet by mouth Daily., Disp: , Rfl:   •  rOPINIRole (Requip) 1 MG tablet, Take 1-2 tablets 1 hour before bedtime for restless legs., Disp: 60 tablet, Rfl: 1  •  SUMAtriptan (IMITREX) 50 MG tablet, Take 1 tablet by mouth 1 (One) Time  As Needed for Migraine for up to 1 dose. Take one tablet at onset of headache. May repeat dose one time in 2 hours if headache not relieved., Disp: 9 tablet, Rfl: 5     Assessment:        Patient Active Problem List   Diagnosis   • Abnormal cardiovascular stress test   • Primary hypertension   • Coronary arteriosclerosis in native artery   • Presence of stent in coronary artery   • SSS (sick sinus syndrome)   • Palpitations   • Anemia, unspecified   • Arthritis   • Asthma   • Degeneration of lumbar intervertebral disc   • Left lumbar radiculopathy   • Lumbar spondylosis   • Dizziness   • Dyskinesia   • Dystonia   • GERD (gastroesophageal reflux disease)   • Hemangioma   • Hepatitis A   • Leg pain   • Leg swelling   • Leg weakness, bilateral   • Long-term current use of opiate analgesic   • Muscle cramps   • Parkinson disease   • Seasonal allergic rhinitis   • Shortness of breath   • Spondylolisthesis of cervical region   • Migraine with aura and without status migrainosus, not intractable   • Postmenopausal   • Sinus infection   • Localized edema   • Other hyperlipidemia   • Dysuria   • Alopecia   • Restless leg syndrome               Plan:   1. Cardiac clearance: she is having echo at [facility] by her pcp. She will need lexiscan for clearance    It was explained to the patient that stress testing carries 85% specificity/sensitivity, and does not rule out future cardiac event.  Risks of the procedure were explained to the patient including shortness of breath, induction of myocardial infarction, and dizziness.  Patient is agreeable to proceeding with stress testing.     2.  Coronary artery disease: No chest pain.  Previous ischemic work-up as above.  Continue aspirin, beta-blockade.    3.  Hyperlipidemia: She reports her PCP manages her cholesterol labs.  Continue current management    4.  Palpitations: Controlled             No diagnosis found.    There are no diagnoses linked to this encounter.    COUNSELING:  Dallin Carter was given to patient for the following topics: diagnostic results, risk factor reductions, impressions, risks and benefits of treatment options and importance of treatment compliance .       SMOKING COUNSELING: Denies    Lexiscan for clearance.    Sincerely,   BASIM Posey  [facility]  04/07/23  10:55 EDT    EMR Dragon/Transcription disclaimer:   Much of this encounter note is an electronic transcription/translation of spoken language to printed text. The electronic translation of spoken language may permit erroneous, or at times, nonsensical words or phrases to be inadvertently transcribed; Although I have reviewed the note for such errors, some may still exist.

## 2023-04-08 LAB
BH CV ECHO MEAS - AO ROOT DIAM: 3 CM
BH CV ECHO MEAS - EDV(MOD-SP2): 47 ML
BH CV ECHO MEAS - EDV(MOD-SP4): 47 ML
BH CV ECHO MEAS - EF(MOD-BP): 55.8 %
BH CV ECHO MEAS - ESV(MOD-SP2): 22 ML
BH CV ECHO MEAS - ESV(MOD-SP4): 21 ML
BH CV ECHO MEAS - IVSD: 0.9 CM
BH CV ECHO MEAS - LA DIMENSION: 2.7 CM
BH CV ECHO MEAS - LAT PEAK E' VEL: 6.5 CM/SEC
BH CV ECHO MEAS - LVIDD: 4.1 CM
BH CV ECHO MEAS - LVIDS: 2.6 CM
BH CV ECHO MEAS - LVOT DIAM: 2 CM
BH CV ECHO MEAS - LVPWD: 0.9 CM
BH CV ECHO MEAS - MED PEAK E' VEL: 6.2 CM/SEC
BH CV ECHO MEAS - MV A MAX VEL: 92 CM/SEC
BH CV ECHO MEAS - MV DEC TIME: 177 MSEC
BH CV ECHO MEAS - MV E MAX VEL: 65 CM/SEC
BH CV ECHO MEAS - MV E/A: 0.7
BH CV ECHO MEAS - RVDD: 2.2 CM
BH CV ECHO MEAS - TAPSE (>1.6): 2 CM
BH CV ECHO MEAS - TR MAX PG: 36 MMHG
BH CV ECHO MEAS - TR MAX VEL: 277 CM/SEC
BH CV ECHO MEASUREMENTS AVERAGE E/E' RATIO: 10.24
IVRT: 55 MSEC
LEFT ATRIUM VOLUME INDEX: 15.9 ML/M2
MAXIMAL PREDICTED HEART RATE: 148 BPM
STRESS TARGET HR: 126 BPM

## 2023-04-26 ENCOUNTER — HOSPITAL ENCOUNTER (OUTPATIENT)
Dept: GENERAL RADIOLOGY | Facility: HOSPITAL | Age: 73
Discharge: HOME | End: 2023-04-26
Payer: MEDICARE

## 2023-04-26 ENCOUNTER — OFFICE VISIT (OUTPATIENT)
Dept: OTOLARYNGOLOGY | Facility: CLINIC | Age: 73
End: 2023-04-26
Payer: MEDICARE

## 2023-04-26 DIAGNOSIS — G20 PARKINSON'S DISEASE: ICD-10-CM

## 2023-04-26 DIAGNOSIS — R13.10 DYSPHAGIA, UNSPECIFIED TYPE: Primary | ICD-10-CM

## 2023-04-26 DIAGNOSIS — R13.10 DYSPHAGIA, UNSPECIFIED TYPE: ICD-10-CM

## 2023-04-26 DIAGNOSIS — K11.7 XEROSTOMIA: ICD-10-CM

## 2023-04-26 PROCEDURE — 63710000001 BARIUM SULFATE 60 % CREAM: Performed by: OTOLARYNGOLOGY

## 2023-04-26 PROCEDURE — A9270 NON-COVERED ITEM OR SERVICE: HCPCS | Performed by: OTOLARYNGOLOGY

## 2023-04-26 PROCEDURE — 63710000001 BARIUM SULFATE 40 % RECONSTITUTED SUSPENSION: Performed by: OTOLARYNGOLOGY

## 2023-04-26 PROCEDURE — 92611 MOTION FLUOROSCOPY/SWALLOW: CPT

## 2023-04-26 PROCEDURE — 74230 X-RAY XM SWLNG FUNCJ C+: CPT

## 2023-04-26 PROCEDURE — 63710000001 BARIUM SULFATE 40 % SUSPENSION: Performed by: OTOLARYNGOLOGY

## 2023-04-26 RX ADMIN — BARIUM SULFATE 1 TEASPOON(S): 0.6 CREAM ORAL at 10:45

## 2023-04-26 RX ADMIN — BARIUM SULFATE 55 ML: 0.81 POWDER, FOR SUSPENSION ORAL at 10:45

## 2023-04-26 RX ADMIN — BARIUM SULFATE 50 ML: 400 SUSPENSION ORAL at 10:45

## 2023-04-26 NOTE — PROGRESS NOTES
"Patient Name: Kristi Ibrahim   Visit Date: 04/26/2023   Patient ID: [id number]  Provider: Jameson Lira MD    Sex: female  Location: [facility]   YOB: 1950  Location Address: [address] [place] [address], [address],?[place]?[id number]    Primary Care Provider Kamron Taylor DO  Location Phone: [phone]    Referring Provider: No ref. provider found        Chief Complaint  No chief complaint on file.    History of Present Illness  TELEHEALTH TELEPHONE VISIT    Kristi Ibrahim is a 72 y.o. female who is presenting for evaluation via telehealth telephone visit. Verbal consent obtained before beginning visit.  She was last seen on 3/13/2023 at which time she felt as though food was hanging up in her throat from time to time.  Today, we discussed the results of her modified barium swallow study which revealed some spurring of C5 and C6 which did not appear to be functionally narrowing the esophagus.  No penetration or aspiration was noted.  We discussed trying to stick to moist foods and alternating sips of liquids with bites of solids.  We discussed other swallowing precautions.  She was given ample time to ask questions, all of which were answered to her satisfaction.  We spent 5 minutes on this conversation.    Past Medical History/Overview of Patient Symptoms  Past Medical History:   Diagnosis Date   • Abnormal ECG    • Allergic    • Anemia, unspecified    • Arthritis    • Asthma     \"ALLERGIC\"   • Atrial fibrillation    • Benign essential hypertension    • Coronary artery disease    • Dental disease    • Diverticulosis    • Dizziness    • Fibromyalgia    • Fibromyalgia, primary    • GERD (gastroesophageal reflux disease)    • H/O degenerative disc disease    • Headache    • 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   • Hepatitis A    • HL (hearing loss)    • Leg pain    • Leg swelling    • Leg weakness, bilateral 09/10/2019   • Low back pain 08/25/2020   • Lumbar herniated disc    • " Lumbar stenosis 09/10/2019   • Mitral valve insufficiency    • Muscle cramps    • Parkinson's disease    • Scoliosis    • Seasonal allergic rhinitis    • Shortness of breath    • Sinus infection 03/31/2022   • Spondylolisthesis of cervical region 09/10/2019   • Spondylolisthesis of lumbar region 08/25/2020   • Tricuspid regurgitation    • Voice hoarseness            Assessment and Plan    Diagnoses and all orders for this visit:    1. Dysphagia, unspecified type (Primary)    2. Parkinson's disease    3. Xerostomia          Follow Up   Return if symptoms worsen or fail to improve.  Patient was given instructions and counseling regarding her condition or for health maintenance advice. Please see specific information pulled into the AVS if appropriate.

## 2023-04-26 NOTE — MBS/VFSS/FEES
"Outpatient - Speech Language Pathology   Swallow Modified Barium Swallow Study BAILEE Wills     Patient Name: Kristi Ibrahim  : 1950  MRN: [id number]  Today's Date: 2023               Admit Date: 2023    Visit Dx:     ICD-10-CM ICD-9-CM   1. Dysphagia, unspecified type  R13.10 787.20     Patient Active Problem List   Diagnosis   • Abnormal cardiovascular stress test   • Primary hypertension   • Coronary arteriosclerosis in native artery   • Presence of stent in coronary artery   • SSS (sick sinus syndrome)   • Palpitations   • Anemia, unspecified   • Arthritis   • Asthma   • Degeneration of lumbar intervertebral disc   • Left lumbar radiculopathy   • Lumbar spondylosis   • Dizziness   • Dyskinesia   • Dystonia   • GERD (gastroesophageal reflux disease)   • Hemangioma   • Hepatitis A   • Leg pain   • Leg swelling   • Leg weakness, bilateral   • Long-term current use of opiate analgesic   • Muscle cramps   • Parkinson disease   • Seasonal allergic rhinitis   • Shortness of breath   • Spondylolisthesis of cervical region   • Migraine with aura and without status migrainosus, not intractable   • Postmenopausal   • Sinus infection   • Localized edema   • Other hyperlipidemia   • Dysuria   • Alopecia   • Restless leg syndrome     Past Medical History:   Diagnosis Date   • Abnormal ECG    • Allergic    • Anemia, unspecified    • Arthritis    • Asthma     \"ALLERGIC\"   • Atrial fibrillation    • Benign essential hypertension    • Coronary artery disease    • Dental disease    • Diverticulosis    • Dizziness    • Fibromyalgia    • Fibromyalgia, primary    • GERD (gastroesophageal reflux disease)    • H/O degenerative disc disease    • Headache    • 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   • Hepatitis A    • HL (hearing loss)    • Leg pain    • Leg swelling    • Leg weakness, bilateral 09/10/2019   • Low back pain 2020   • Lumbar herniated disc    • Lumbar stenosis 09/10/2019   • Mitral valve insufficiency    • " Muscle cramps    • Parkinson's disease    • Scoliosis    • Seasonal allergic rhinitis    • Shortness of breath    • Sinus infection 03/31/2022   • Spondylolisthesis of cervical region 09/10/2019   • Spondylolisthesis of lumbar region 08/25/2020   • Tricuspid regurgitation    • Voice hoarseness      Past Surgical History:   Procedure Laterality Date   • ADENOIDECTOMY     • BREAST SURGERY  Multiple cysts removed   • CARDIAC CATHETERIZATION     • CARDIAC CATHETERIZATION N/A 04/23/2018    Procedure: Left Heart Cath;  Surgeon: Malina Breen MD;  Location:  [facility];  Service: Cardiovascular   • CARDIAC CATHETERIZATION N/A 04/23/2018    Procedure: Coronary angiography;  Surgeon: Malina Breen MD;  Location:  [facility];  Service: Cardiovascular   • CARDIAC CATHETERIZATION N/A 04/23/2018    Procedure: Left ventriculography;  Surgeon: Malina Breen MD;  Location:  [facility];  Service: Cardiovascular   • CARPAL TUNNEL RELEASE Bilateral    • CHOLECYSTECTOMY     • COLONOSCOPY     • CORONARY ANGIOPLASTY     • CORONARY STENT PLACEMENT     • EYE SURGERY  Cateracts   • FOOT SURGERY     • HAND SURGERY     • KNEE ARTHROSCOPY Left    • OTHER SURGICAL HISTORY      JOINT SURGERY   • RIB BIOPSY, RIB RESECTION     • SPINAL FUSION     • TONSILLECTOMY AND ADENOIDECTOMY     • TUBAL ABDOMINAL LIGATION         MODIFIED BARIUM SWALLOW STUDY: SPEECH PATHOLOGY REPORT        DATE OF SERVICE: 4/26/2023    PERTINENT INFORMATION:  Ms. Ibrahim is a 72year old female with diagnosis of dysphagia.  Patient states complaint of foods catching with her indicating low throat.  Patient also reports dry mouth.    She was referred for an MBSS by Dr. Lira to rule out aspiration as well as to determine appropriate treatment plan for this patient.      PROCEDURE:    Ms. Ibrahim was alert and cooperative.  The patient was viewed in the lateral plane.  The following Ba consistencies were  administered: Thin liquids, nectar thick liquids, barium mixed with applesauce, barium paste, barium mixed with cracker. The following compensatory swallowing strategies were performed: Bolus modification, cyclic ingestion.      RESULTS:    1.  Nectar liquid by spoon with swallow completed.  2. Nectar liquid by cup with swallow completed.  3. Thin liquid with swallow completed  4. Purée with swallow completed.  Residual coating of the tongue base, clears with second swallow.  5. Pudding with swallow completed.  Oral residue with lingual pumping and swallow completed, residual noted at the oral cavity, tongue base and minimal residue at the vallecula.  Multiple swallow does not completely clear.  Liquid wash assisted to clear.  6. Solid results with chewing followed by swallow completed.  Minimal residual at the tongue base.  7. Thin liquid via straw with swallow completed.  Patient demonstrated sequential swallows.  Irregular bolus flow was noted through the cricopharyngeal area with osteophyte like structure is noted.  This did not appear to impede the bolus.        IMPRESSIONS:    Ms. Ibrahim demonstrated oral pharyngeal swallow which is grossly within functional limits. No laryngeal penetration, no aspiration were observed during this study.  Please see radiologist report.    FUNCTIONAL DEFICIT: Patient scored level 7 of 7 on Functional Communication Measures for swallowing indicating a 0% limitation in function for current status, goal status, and discharge status.      RECOMMENDATIONS:   1.  Diet with regular moist solids cut small, thin liquid.  2.  Positioning fully upright for all p.o. intake and 30 minutes following.  3.  Alternate small bites and small sips of solids and liquids.  Liquid wash with double swallow.        Yes, patient/responsible party agrees with the plan of care and has been informed of all alternatives, risks and benefits.    Thank you for this referral.                                                                                        EDUCATION  The patient has been educated in the following areas:   Modified Diet Instruction.              Time Calculation:    Time Calculation- SLP     Row Name 04/26/23 1145             Time Calculation- SLP    SLP Received On 04/26/23  -TB         Untimed Charges    SLP Eval/Re-eval  ST Motion Fluoro Eval Swallow - 27488  -TB      [id number]-[id number] Motion Fluoro Eval Swallow Minutes 90  -TB         Total Minutes    Untimed Charges Total Minutes 90  -TB       Total Minutes 90  -TB            User Key  (r) = Recorded By, (t) = Taken By, (c) = Cosigned By    Initials Name Provider Type    TB Mariposa Soni SLP Speech and Language Pathologist                Therapy Charges for Today     Code Description Service Date Service Provider Modifiers Qty    [id number] HC ST MOTION FLUORO EVAL SWALLOW 6 4/26/2023 Mariposa Soni SLP GN 1               ODILON Bonilla  4/26/2023

## 2023-05-04 ENCOUNTER — HOSPITAL ENCOUNTER (OUTPATIENT)
Dept: NUCLEAR MEDICINE | Facility: HOSPITAL | Age: 73
Discharge: HOME | End: 2023-05-04
Payer: MEDICARE

## 2023-05-04 PROCEDURE — A9502 TC99M TETROFOSMIN: HCPCS

## 2023-05-04 PROCEDURE — 25010000002 REGADENOSON 0.4 MG/5ML SOLUTION

## 2023-05-04 PROCEDURE — 0 TECHNETIUM TETROFOSMIN KIT

## 2023-05-04 PROCEDURE — 93017 CV STRESS TEST TRACING ONLY: CPT

## 2023-05-04 PROCEDURE — 78452 HT MUSCLE IMAGE SPECT MULT: CPT

## 2023-05-04 RX ORDER — REGADENOSON 0.08 MG/ML
0.4 INJECTION, SOLUTION INTRAVENOUS
Status: COMPLETED | OUTPATIENT
Start: 2023-05-04 | End: 2023-05-04

## 2023-05-04 RX ADMIN — TETROFOSMIN 1 DOSE: 1.38 INJECTION, POWDER, LYOPHILIZED, FOR SOLUTION INTRAVENOUS at 07:18

## 2023-05-04 RX ADMIN — REGADENOSON 0.4 MG: 0.08 INJECTION, SOLUTION INTRAVENOUS at 08:20

## 2023-05-04 RX ADMIN — TETROFOSMIN 1 DOSE: 1.38 INJECTION, POWDER, LYOPHILIZED, FOR SOLUTION INTRAVENOUS at 08:20

## 2023-05-08 ENCOUNTER — TELEPHONE (OUTPATIENT)
Dept: CARDIOLOGY | Facility: CLINIC | Age: 73
End: 2023-05-08
Payer: MEDICARE

## 2023-05-08 LAB
BH CV IMMEDIATE POST RECOVERY TECH DATA SYMPTOMS: NORMAL
BH CV IMMEDIATE POST TECH DATA BLOOD PRESSURE: NORMAL MMHG
BH CV IMMEDIATE POST TECH DATA HEART RATE: 85 BPM
BH CV IMMEDIATE POST TECH DATA OXYGEN SATS: 96 %
BH CV REST NUCLEAR ISOTOPE DOSE: 10 MCI
BH CV SIX MINUTE RECOVERY TECH DATA BLOOD PRESSURE: NORMAL
BH CV SIX MINUTE RECOVERY TECH DATA HEART RATE: 85 BPM
BH CV SIX MINUTE RECOVERY TECH DATA OXYGEN SATURATION: 92 %
BH CV STRESS BP STAGE 1: NORMAL
BH CV STRESS COMMENTS STAGE 1: NORMAL
BH CV STRESS DOSE REGADENOSON STAGE 1: 0.4
BH CV STRESS DURATION MIN STAGE 1: 0
BH CV STRESS DURATION SEC STAGE 1: 10
BH CV STRESS HR STAGE 1: 86
BH CV STRESS NUCLEAR ISOTOPE DOSE: 37 MCI
BH CV STRESS O2 STAGE 1: 98
BH CV STRESS PROTOCOL 1: NORMAL
BH CV STRESS RECOVERY BP: NORMAL MMHG
BH CV STRESS RECOVERY HR: 85 BPM
BH CV STRESS RECOVERY O2: 92 %
BH CV STRESS STAGE 1: 1
BH CV THREE MINUTE POST TECH DATA BLOOD PRESSURE: NORMAL MMHG
BH CV THREE MINUTE POST TECH DATA HEART RATE: 85 BPM
BH CV THREE MINUTE POST TECH DATA OXYGEN SATURATION: 93 %
LV EF NUC BP: 54 %
MAXIMAL PREDICTED HEART RATE: 148 BPM
PERCENT MAX PREDICTED HR: 58.11 %
STRESS BASELINE BP: NORMAL MMHG
STRESS BASELINE HR: 72 BPM
STRESS O2 SAT REST: 96 %
STRESS PERCENT HR: 68 %
STRESS POST O2 SAT PEAK: 98 %
STRESS POST PEAK BP: NORMAL MMHG
STRESS POST PEAK HR: 86 BPM
STRESS TARGET HR: 126 BPM

## 2023-05-08 NOTE — TELEPHONE ENCOUNTER
Myocardial perfusion imaging indicates a normal myocardial perfusion study with no evidence of ischemia.  Follow up as scheduled

## 2023-06-07 ENCOUNTER — OFFICE VISIT (OUTPATIENT)
Dept: CARDIOLOGY | Facility: CLINIC | Age: 73
End: 2023-06-07
Payer: MEDICARE

## 2023-06-07 VITALS
HEIGHT: 64 IN | DIASTOLIC BLOOD PRESSURE: 71 MMHG | BODY MASS INDEX: 26.98 KG/M2 | WEIGHT: 158 LBS | SYSTOLIC BLOOD PRESSURE: 130 MMHG | HEART RATE: 77 BPM

## 2023-06-07 DIAGNOSIS — E78.49 OTHER HYPERLIPIDEMIA: ICD-10-CM

## 2023-06-07 DIAGNOSIS — I25.10 CAD S/P PERCUTANEOUS CORONARY ANGIOPLASTY: Primary | ICD-10-CM

## 2023-06-07 DIAGNOSIS — Z95.5 PRESENCE OF STENT IN CORONARY ARTERY: ICD-10-CM

## 2023-06-07 DIAGNOSIS — I10 PRIMARY HYPERTENSION: ICD-10-CM

## 2023-06-07 DIAGNOSIS — I49.5 SSS (SICK SINUS SYNDROME): ICD-10-CM

## 2023-06-07 DIAGNOSIS — Z98.61 CAD S/P PERCUTANEOUS CORONARY ANGIOPLASTY: Primary | ICD-10-CM

## 2023-06-07 PROCEDURE — 99214 OFFICE O/P EST MOD 30 MIN: CPT | Performed by: INTERNAL MEDICINE

## 2023-06-07 PROCEDURE — 3078F DIAST BP <80 MM HG: CPT | Performed by: INTERNAL MEDICINE

## 2023-06-07 PROCEDURE — 3075F SYST BP GE 130 - 139MM HG: CPT | Performed by: INTERNAL MEDICINE

## 2023-06-07 RX ORDER — GUAIFENESIN 600 MG/1
1 TABLET, EXTENDED RELEASE ORAL DAILY
COMMUNITY

## 2023-06-07 RX ORDER — ATORVASTATIN CALCIUM 20 MG/1
20 TABLET, FILM COATED ORAL DAILY
Qty: 90 TABLET | Refills: 3 | Status: SHIPPED | OUTPATIENT
Start: 2023-06-07

## 2023-06-07 NOTE — ASSESSMENT & PLAN NOTE
Patient with elevated LDL level given previous CAD history discussed risk benefits of statin treatment was agreeable to trying counseled on side effects we will start on Lipitor 20 nightly repeat lipids LFTs next visit

## 2023-06-07 NOTE — PROGRESS NOTES
"Chief Complaint  Establish Care, Atrial Fibrillation, Hypertension, Coronary arteriosclerosis in native artery, and sx clearance       History of Present Illness  Kristi Ibrahim presents to [facility] CARDIOLOG[facility]  Patient is a 73-year-old with CAD prior stent to LAD in 2012, hypertension, sick sinus syndrome, dyslipidemia, and Parkinson's disease who is here to transfer care.  She has had some occasional substernal chest pain lasting for minutes not exertional she states is occurred after doing some increased physical labor around the house.  She denies any change in breathing capacity no PND orthopnea lower extreme edema.  No syncopal episodes  Regular rate and rhythm  Past Medical History:   Diagnosis Date    Abnormal ECG     Allergic     Anemia, unspecified     Arthritis     Asthma     \"ALLERGIC\"    Atrial fibrillation     Benign essential hypertension     Coronary artery disease     Dental disease     Diverticulosis     Dizziness     Fibromyalgia     Fibromyalgia, primary     GERD (gastroesophageal reflux disease)     H/O degenerative disc disease     Headache     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    Hepatitis A     HL (hearing loss)     Leg pain     Leg swelling     Leg weakness, bilateral 09/10/2019    Low back pain 08/25/2020    Lumbar herniated disc     Lumbar stenosis 09/10/2019    Mitral valve insufficiency     Muscle cramps     Parkinson's disease     Scoliosis     Seasonal allergic rhinitis     Shortness of breath     Sinus infection 03/31/2022    Spondylolisthesis of cervical region 09/10/2019    Spondylolisthesis of lumbar region 08/25/2020    Tricuspid regurgitation     Voice hoarseness          Current Outpatient Medications:     albuterol sulfate  (90 Base) MCG/ACT inhaler, Inhale 2 puffs Every 4 (Four) Hours As Needed for Wheezing., Disp: 18 g, Rfl: 11    amantadine (SYMMETREL) 100 MG capsule, , Disp: , Rfl:     aspirin 81 MG tablet, Take 1 tablet by mouth Every Other Day., " Disp: , Rfl:     baclofen (LIORESAL) 10 MG tablet, Take 1 tablet by mouth 3 (Three) Times a Day., Disp: , Rfl:     carbidopa-levodopa (SINEMET)  MG per tablet, Take 1.5 tablets by mouth 3 (Three) Times a Day., Disp: , Rfl:     cetirizine (zyrTEC) 10 MG tablet, Take 1 tablet by mouth Daily., Disp: , Rfl:     fluticasone (FLOVENT HFA) 110 MCG/ACT inhaler, Inhale 2 puffs 2 (Two) Times a Day., Disp: 12 g, Rfl: 11    guaiFENesin (MUCINEX) 600 MG 12 hr tablet, Take 1 tablet by mouth Daily., Disp: , Rfl:     hydroCHLOROthiazide (MICROZIDE) 12.5 MG capsule, Take 1 capsule by mouth Daily. (Patient taking differently: Take 1 capsule by mouth Daily. Pt only takes twice a week), Disp: 30 capsule, Rfl: 11    lidocaine (LIDODERM) 5 %, , Disp: , Rfl:     magnesium oxide (MAG-OX) 400 MG tablet, Take 1 tablet by mouth 2 (Two) Times a Day., Disp: , Rfl:     meclizine (ANTIVERT) 25 MG tablet, Take 1 tablet by mouth Daily., Disp: , Rfl:     Multiple Vitamins-Minerals (HAIR SKIN AND NAILS FORMULA PO), Take 1 tablet by mouth Daily., Disp: , Rfl:     nebivolol (BYSTOLIC) 2.5 MG tablet, TAKE 1 TABLET DAILY, Disp: 90 tablet, Rfl: 1    nitroglycerin (NITROSTAT) 0.4 MG SL tablet, Place 1 tablet under the tongue Every 5 (Five) Minutes As Needed for Chest Pain., Disp: 25 tablet, Rfl: 5    ondansetron (ZOFRAN) 4 MG tablet, ondansetron HCl 4 mg tablet, Disp: , Rfl:     Probiotic Product (SOLUBLE FIBER/PROBIOTICS PO), Take 1 tablet by mouth Daily., Disp: , Rfl:     rOPINIRole (Requip) 1 MG tablet, Take 1-2 tablets 1 hour before bedtime for restless legs., Disp: 60 tablet, Rfl: 1    SUMAtriptan (IMITREX) 50 MG tablet, Take 1 tablet by mouth 1 (One) Time As Needed for Migraine for up to 1 dose. Take one tablet at onset of headache. May repeat dose one time in 2 hours if headache not relieved., Disp: 9 tablet, Rfl: 5    atorvastatin (LIPITOR) 20 MG tablet, Take 1 tablet by mouth Daily., Disp: 90 tablet, Rfl: 3    Medications Discontinued During  "This Encounter   Medication Reason    colchicine 0.6 MG tablet *Therapy completed    dimenhyDRINATE (DRAMAMINE) 50 MG tablet *Therapy completed     Allergies   Allergen Reactions    Nickel Rash    Rapeseed [Brassica Napus Seed Oil] Hives     Had to go to ER for treatment.    Ace Inhibitors Unknown (See Comments)     NOT  SURE    Acetaminophen Swelling    Brompheniramine Unknown (See Comments)     NOT SURE    Codeine Nausea And Vomiting    Diclofenac Urinary Retention     GI PAIN - ULCERS???    Flurbiprofen Unknown (See Comments)     NOT SURE    Ibuprofen Unknown (See Comments)     NOT SURE    Indocyanine Green Unknown (See Comments)     NOT SURE    Pantoprazole Nausea And Vomiting    Propoxyphene Unknown (See Comments)     NOT SURE    Sulfamethoxazole Hives    Trimethoprim Hives    Vegetable Oil Hives    Iodinated Contrast Media Rash        Social History     Tobacco Use    Smoking status: Never    Smokeless tobacco: Never   Vaping Use    Vaping Use: Never used   Substance Use Topics    Alcohol use: Never    Drug use: Never       Family History   Problem Relation Age of Onset    Heart disease Mother     Stroke Mother     Arthritis Mother     Osteoarthritis Mother     Heart disease Father     Hypertension Father     Arthritis Father     Stomach cancer Father     Rheum arthritis Father     Heart disease Brother     Hypertension Brother     Arthritis Brother     Breast cancer Sister     Arthritis Sister     Cancer Sister         Breast    Cancer Maternal Grandmother         Lung    Cancer Sister         Breast    Diabetes Brother     Hypertension Brother         Objective     /71   Pulse 77   Ht 161.3 cm (63.5\")   Wt 71.7 kg (158 lb)   BMI 27.55 kg/m²       Physical Exam    General Appearance:   no acute distress  Alert and oriented x3  HENT:   lips not cyanotic  Atraumatic  Neck:  No jvd   supple  Respiratory:  no respiratory distress  normal breath sounds  no rales  Cardiovascular:  Regular rate and " rhythm  no S3, no S4   no murmur  no rub  Extremities  No cyanosis  lower extremity edema: none    Skin:   warm, dry  No rashes    Result Review :     proBNP   Date Value Ref Range Status   05/25/2022 1,008.0 (H) 0.0 - 900.0 pg/mL Final     CMP          8/1/2022    14:53   CMP   Glucose 102    BUN 11    Creatinine 0.74    EGFR 86.1    Sodium 140    Potassium 4.5    Chloride 101    Calcium 9.8    Total Protein 7.2    Albumin 4.60    Globulin 2.6    Total Bilirubin 0.5    Alkaline Phosphatase 72    AST (SGOT) 26    ALT (SGPT) 15    Albumin/Globulin Ratio 1.8    BUN/Creatinine Ratio 14.9    Anion Gap 10.6         Lab Results   Component Value Date    TSH 1.530 12/07/2022      Lab Results   Component Value Date    FREET4 1.37 12/07/2022      No results found for: DDIMERQUANT  No results found for: MG   No results found for: DIGOXIN   No results found for: TROPONINT   No results found for: POCTROP(       Lipid Panel          8/1/2022    14:53   Lipid Panel   Total Cholesterol 206    Triglycerides 118    HDL Cholesterol 75    VLDL Cholesterol 21    LDL Cholesterol  110    LDL/HDL Ratio 1.43      Results for orders placed during the hospital encounter of 04/07/23    Adult Transthoracic Echo Complete W/ Cont if Necessary Per Protocol    Interpretation Summary  Normal left ventricular systolic function.  Mild MR and mild TR.         Results for orders placed during the hospital encounter of 04/23/18    Cardiac Catheterization/Vascular Study    Narrative  Table formatting from the original result was not included.    · Successful right and left coronary angiogram and LV gram  · Normal left main  · Normal LAD including the diagonal and  branches with the proximal LAD stent widely patent  · Circumflex artery nondominant and normal  · RCA dominant with no significant atherosclerosis  · Normal LV gram    April 23, 2018    [facility]  1950  67 y.o.  female  [id number]    Procedures Performed    1. Left heart  catheterization  2. Selective coronary angiography  3. Left ventriculography  4.      :   Malina Breen MD    Vascular Access Site: Right radial    Indication for procedure: Positive stress test    Referring Physician:      Pertinent History  @PMH@  @PSH@    Procedure Note    After discussing the risks, benefits, and alternatives of the procedure, informed consent was obtained.  The vascular access site was prepped and draped in the usual sterile fashion.  2% lidocaine was used for local anesthesia. Appropriate landmarks were assessed.  A 5 Tajik short sheath was inserted in the artery using the modified Seldinger technique.    Selective coronary angiography was performed with JL4 and JR4 diagnostic catheters. Left ventriculogram  was performed with an angled pigtail catheter.  All exchanges were performed over the wire.  There were no apparent acute or early complications.  The patient tolerated the procedure well and was transferred to the recovery area in stable condition.    Artery hemostasis was achieved with manual pressure.        Contrast:    70  cc  Complications:  None  Blood Loss: minimal      Hemodynamics    Pressures    Ao:    120/80 mmHg  LV:    120/10  mmHg  End-diastolic pressure:  10  mmHg  No significant aortic valvular gradient on pullback    Coronary Angiography    Left Main:  The left main originates in the left coronary cusp.  It bifurcates and gives rise to the LAD and circumflex system.  Normal    Left Anterior Descending:  Normal LAD including the diagonal and  branches with the proximal LAD stent widely patent    Left Circumflex:  Circumflex artery nondominant and normal    Right Coronary Artery:  The right coronary artery originates in the right coronary cusp.  RCA dominant with no significant atherosclerosis    Left Ventriculography:    Estimated Ejection Fraction: 60 %  Wall motion abnormalities:  None  Mitral Regurgitation:  None    Impression:    1. Normal  left main  2. Normal LAD including the diagonal and  branches with the proximal LAD stent widely patent  3. Circumflex artery nondominant and normal  4. RCA dominant with no significant atherosclerosis  5. Normal LV gram    Recommendations:    1. With the LAD stent widely patent no significant stenosis patient be treated medically        I sincerely appreciate the opportunity to participate in your patient's care. Please feel free to contact me anytime if I can be of assistance in this or any other way.    Malina Breen MD  4/23/2018  2:14 PM     Results for orders placed in visit on 04/07/23    Stress Test With Myocardial Perfusion One Day    Interpretation Summary    Left ventricular ejection fraction is normal (Calculated EF = 54%).    Myocardial perfusion imaging indicates a normal myocardial perfusion study with no evidence of ischemia.    Impressions are consistent with a low risk study.    Findings consistent with a normal ECG stress test.          The 10-year ASCVD risk score (Julia WATKINS, et al., 2019) is: 17.4%    Values used to calculate the score:      Age: 73 years      Sex: Female      Is Non- : No      Diabetic: No      Tobacco smoker: No      Systolic Blood Pressure: 130 mmHg      Is BP treated: Yes      HDL Cholesterol: 75 mg/dL      Total Cholesterol: 206 mg/dL     Diagnoses and all orders for this visit:    1. CAD S/P percutaneous coronary angioplasty (Primary)  Overview:  Lad stent '12    Assessment & Plan:  Patient with no exertional angina some occasional brief atypical chest pain episodes recent stress test showed no ischemia though the patient can proceed with her planned surgery is at moderate risk given her previous CAD history recommend continuing chronic aspirin 81 mg once a day      2. SSS (sick sinus syndrome)    3. Primary hypertension  Assessment & Plan:  Blood pressures well controlled continue with hydrochlorothiazide 12.5 mg as needed and  Bystolic 2.5 mg daily dosing      4. Presence of stent in coronary artery    5. Other hyperlipidemia  Assessment & Plan:  Patient with elevated LDL level given previous CAD history discussed risk benefits of statin treatment was agreeable to trying counseled on side effects we will start on Lipitor 20 nightly repeat lipids LFTs next visit    Orders:  -     atorvastatin (LIPITOR) 20 MG tablet; Take 1 tablet by mouth Daily.  Dispense: 90 tablet; Refill: 3  -     Lipid Panel; Future  -     Hepatic Function Panel; Future            Follow Up     Return in about 6 months (around 12/7/2023) for Follow with Shira Nguyễn.          Patient was given instructions and counseling regarding her condition or for health maintenance advice. Please see specific information pulled into the AVS if appropriate.

## 2023-06-07 NOTE — ASSESSMENT & PLAN NOTE
Patient with no exertional angina some occasional brief atypical chest pain episodes recent stress test showed no ischemia though the patient can proceed with her planned surgery is at moderate risk given her previous CAD history recommend continuing chronic aspirin 81 mg once a day

## 2023-06-07 NOTE — ASSESSMENT & PLAN NOTE
Blood pressures well controlled continue with hydrochlorothiazide 12.5 mg as needed and Bystolic 2.5 mg daily dosing

## 2023-06-15 ENCOUNTER — TELEPHONE (OUTPATIENT)
Dept: CARDIOLOGY | Facility: CLINIC | Age: 73
End: 2023-06-15
Payer: MEDICARE

## 2023-06-15 NOTE — TELEPHONE ENCOUNTER
Caller:JANUSZ     Relationship: SELF    Best call back number: [phone]    What is the best time to reach you: ANY    Who are you requesting to speak with (clinical staff, provider,  specific staff member): ANY    Do you know the name of the person who called:     What was the call regarding: PATIENT SAW [facility] LAST WEEK AND WAS GIVEN CARDIAC CLEARANCE. PROVIDERS OFFICE HASN'T RECEIVED THE FORM. PLEASE FAX OVER A CARDIAC CLEARANCE TO [place] [facility] ATTN : FILIPE .[phone]. PATIENT IS WAITING ON THIS LETTER TO BE ABLE TO SCHEDULE SURGERY - OFFICE SENT FORM TO [facility] ON 5.7.23. PLEASE ADVISE. THANK YOU!    Is it okay if the provider responds through [employer]:

## 2023-07-31 ENCOUNTER — TREATMENT (OUTPATIENT)
Dept: PHYSICAL THERAPY | Facility: CLINIC | Age: 73
End: 2023-07-31
Payer: MEDICARE

## 2023-07-31 ENCOUNTER — OFFICE VISIT (OUTPATIENT)
Dept: PHYSICAL THERAPY | Facility: CLINIC | Age: 73
End: 2023-07-31
Payer: MEDICARE

## 2023-07-31 DIAGNOSIS — R49.0 HYPOFUNCTIONAL DYSPHONIA: Primary | ICD-10-CM

## 2023-07-31 DIAGNOSIS — G20 PARKINSON'S DISEASE: ICD-10-CM

## 2023-07-31 DIAGNOSIS — R26.89 IMBALANCE: ICD-10-CM

## 2023-07-31 DIAGNOSIS — G20 PARKINSONS: Primary | ICD-10-CM

## 2023-07-31 DIAGNOSIS — R26.9 GAIT DISTURBANCE: ICD-10-CM

## 2023-08-01 ENCOUNTER — TREATMENT (OUTPATIENT)
Dept: PHYSICAL THERAPY | Facility: CLINIC | Age: 73
End: 2023-08-01
Payer: MEDICARE

## 2023-08-01 DIAGNOSIS — G20 PARKINSON'S DISEASE: ICD-10-CM

## 2023-08-01 DIAGNOSIS — R26.89 IMBALANCE: ICD-10-CM

## 2023-08-01 DIAGNOSIS — G20 PARKINSONS: Primary | ICD-10-CM

## 2023-08-01 DIAGNOSIS — R49.0 HYPOFUNCTIONAL DYSPHONIA: Primary | ICD-10-CM

## 2023-08-01 DIAGNOSIS — R26.9 GAIT DISTURBANCE: ICD-10-CM

## 2023-08-02 ENCOUNTER — TREATMENT (OUTPATIENT)
Dept: PHYSICAL THERAPY | Facility: CLINIC | Age: 73
End: 2023-08-02
Payer: MEDICARE

## 2023-08-02 DIAGNOSIS — R26.9 GAIT DISTURBANCE: ICD-10-CM

## 2023-08-02 DIAGNOSIS — G20 PARKINSON'S DISEASE: ICD-10-CM

## 2023-08-02 DIAGNOSIS — G20 PARKINSONS: ICD-10-CM

## 2023-08-02 DIAGNOSIS — G20 PARKINSON'S DISEASE: Primary | ICD-10-CM

## 2023-08-02 DIAGNOSIS — R49.0 HYPOFUNCTIONAL DYSPHONIA: Primary | ICD-10-CM

## 2023-08-02 PROCEDURE — 97112 NEUROMUSCULAR REEDUCATION: CPT | Performed by: PHYSICAL THERAPIST

## 2023-08-02 PROCEDURE — 97530 THERAPEUTIC ACTIVITIES: CPT | Performed by: PHYSICAL THERAPIST

## 2023-08-04 ENCOUNTER — TREATMENT (OUTPATIENT)
Dept: PHYSICAL THERAPY | Facility: CLINIC | Age: 73
End: 2023-08-04
Payer: MEDICARE

## 2023-08-04 DIAGNOSIS — R49.0 HYPOFUNCTIONAL DYSPHONIA: Primary | ICD-10-CM

## 2023-08-04 DIAGNOSIS — G20 PARKINSON'S DISEASE: ICD-10-CM

## 2023-08-07 ENCOUNTER — TREATMENT (OUTPATIENT)
Dept: PHYSICAL THERAPY | Facility: CLINIC | Age: 73
End: 2023-08-07
Payer: MEDICARE

## 2023-08-07 DIAGNOSIS — G20 PARKINSON'S DISEASE: ICD-10-CM

## 2023-08-07 DIAGNOSIS — R26.9 GAIT DISTURBANCE: ICD-10-CM

## 2023-08-07 DIAGNOSIS — R49.0 HYPOFUNCTIONAL DYSPHONIA: Primary | ICD-10-CM

## 2023-08-07 DIAGNOSIS — G20 PARKINSONS: Primary | ICD-10-CM

## 2023-08-07 DIAGNOSIS — R26.89 IMBALANCE: ICD-10-CM

## 2023-08-07 PROCEDURE — 97112 NEUROMUSCULAR REEDUCATION: CPT | Performed by: PHYSICAL THERAPIST

## 2023-08-07 PROCEDURE — 97530 THERAPEUTIC ACTIVITIES: CPT | Performed by: PHYSICAL THERAPIST

## 2023-08-07 PROCEDURE — 97110 THERAPEUTIC EXERCISES: CPT | Performed by: PHYSICAL THERAPIST

## 2023-08-07 PROCEDURE — 92507 TX SP LANG VOICE COMM INDIV: CPT | Performed by: SPEECH-LANGUAGE PATHOLOGIST

## 2023-08-07 NOTE — PROGRESS NOTES
Outpatient Speech Language Pathology   Adult Speech Voice Treatment Note       Patient Name: Kristi Ibrahim  : 1950  MRN: [id number]  Today's Date: 2023         Visit Date: 2023         Today Visit number: 5  Medical Diagnosis:PD  Treatment Diagnosis:hypofunctional dysphonia, PD      SUBJECTIVE  Patient Comments:    Patient indicates she is completing LSVT HEP.  Patient is receiving LSVT LOUD at the beginning of her dose of Ldopa.  Patient indicates she was able to sing at [employer].      Education:   Patient educated carryover tasks and HEP      OBJECTIVE  Patient is here today to receive voice therapy to increase her intensity of voice using the LSVT protocol to safely and effectively communicate wants and needs during IADLs.           SPEECH THERAPY      GOALS ACTIVITY PERFORMED TRIALS COMPLETED LEVEL OF CUEING REQUIRED   LTG 1:8 weeks:  The patient will demonstrate 1-19% limitation by achieving a level of 6 on the Functional Communication Measures for voice by completing the [facility] Voice Treatment program to increase vocal intensity for patient to safely communicate wants and needs and participate in vocational , avocation and social activities in low and high vocal demands with self monitoring and minimal situational variances.        STG 1a: 8 weeks:  Patient will increase vocal intensity to reach a target sound pressure level of 72dB SPL for 15 seconds or more during sustained phonation using proper breath support with min assist to promote safe communication of wants and needs during ADLS. Sustained phonation on /a/ 10 repetitions using the LOUD voice with an average sound pressure increased from  82.2 dB to 84.6 dB and average phonation time decreased from 27.1 to 23.1 seconds  with microphone at 30cm     Mod assist     STG 1b: 8 weeks:  Patient will increase vocal intensity to reach a target sound pressure level of 71dB SPL or more with min cues to increase loudness during reading at  "the word and sentence to increase vocal respiratory support to  increase vocal intensity for safe communication of wants and needs during ADLS.  Reading Gardening sentences   Reading  gardening sentences patient averaged 76.4 dB Mod  assist for breath support   STG 1c: 8 weeks: Patient will reach a target sound pressure level of 68dB or more with min cueing to communicate effectively during simple structured conversational speech, which will help to increase vocal respiratory support for safe functional communication of wants and needs during ADLs.  Off the cuff responses      Answering questions Patient averaged 65.3 dB      Patient averaged 73.1 Moderate assist   STG1d: 8 weeks:  Patient will complete vocal range exercises to increase vocal cord flexibility to 1 or more octaves with min assist.  Sustain highest and lowest vocal pitch for five seconds 10 repetitions of high pitch with an average pitch of 472.9 Hz at 74.6 dB    10 repetitions of lowest pitch with an average pitch 171.3 at 74.6 dB Mod to max assist to cue patient for breath support and Loudness.           Short two word phrase was used to stimulate decrease in pitch \"he haw and go Home\" to open the pharynx and use breath support to facilitate LOUD and low or high voicing.                  * Patient given a HEP to complete one time per day when in therapy and two times a day if she does not have voice therapy.                         ASSESSMENT/PLAN  Need for skilled care:  Patient requires the skills of a therapist to provide moderate  assist to increase vocal intensity, maximum phonation time and mod to max assist to increase pitch range to develop a strong voice for safe and intelligible conversations during IADLs.    Progress towards goals: Patient able to use her new LOUD voicing techniques to sing in [place] this weekend.      Barriers to Achieving Goals:  none    Changes to Plan: continue plan of care      Marisa Inman MS, CCC-SLP    ST " SIGNATURE: [employer]    Electronically signed 8/7/2023    [place] License: ST - [id number]

## 2023-08-07 NOTE — PROGRESS NOTES
Outpatient Physical Therapy  [address] [facility], [place] [address]                            Physical Therapy Daily Treatment Note    Patient: Kristi Ibrahim   : 1950  Diagnosis/ICD-10 Code:  Parkinsons [G20]  Referring practitioner: Ruperto Conley MD  Date of Initial Visit: Type: THERAPY  Noted: 2023  Today's Date: 2023  Patient seen for 4 sessions           Subjective   Kristi Ibrahim reports: that she has been doing her exercises, states no falls over the last few weeks.     Objective   1 LOB during LSVT BIG but able to self correct    See Exercise, Manual, and Modality Logs for complete treatment.     Assessment/Plan  Kristi tolerated LSVT BIG exercise well, required Stand by Assist for Safety to perform exercises correctly. Pt would benefit from skilled PT to address strength and endurance deficits within LSVT BIG protocol, transfer and gait training and any concerns with ADLs.       Progress per Plan of Care         Timed:  Manual Therapy:         mins  16013;  Therapeutic Exercise:    15     mins  39898;     Neuromuscular Candi:    15    mins  17937;    Therapeutic Activity:     15     mins  23351;     Gait Training:           mins  86965;    Aquatic Therapy:          mins  04876;       Timed Treatment:   45   mins   Total Treatment:     45   mins      Electronically signed:   Estella Berger PTA  Physical Therapist Assistant  Chela THAKUR License #: [id number]  LSVT BIG Certified: [id number]

## 2023-08-08 ENCOUNTER — TREATMENT (OUTPATIENT)
Dept: PHYSICAL THERAPY | Facility: CLINIC | Age: 73
End: 2023-08-08
Payer: MEDICARE

## 2023-08-08 DIAGNOSIS — R49.0 HYPOFUNCTIONAL DYSPHONIA: Primary | ICD-10-CM

## 2023-08-08 DIAGNOSIS — G20 PARKINSON'S DISEASE: Primary | ICD-10-CM

## 2023-08-08 DIAGNOSIS — R26.9 GAIT DISTURBANCE: ICD-10-CM

## 2023-08-08 DIAGNOSIS — G20 PARKINSON'S DISEASE: ICD-10-CM

## 2023-08-08 DIAGNOSIS — R26.89 IMBALANCE: ICD-10-CM

## 2023-08-08 NOTE — PROGRESS NOTES
Physical Therapy Daily Treatment Note  [address] [place] [address]    Patient: Kristi Ibrahim   : 1950  Diagnosis/ICD-10 Code:  Parkinson's disease [G20]  Referring practitioner: Ruperto Conley MD  Date of Initial Visit: Type: THERAPY  Noted: 2023  Today's Date: 2023  Patient seen for 5 sessions           Subjective : Patient reports that she is doing well today. She reports that she was able to sing for the first time in [employer] recently which she has not been able to do in a while. She also points out how much the shaking in her hand has reduced.     Objective   See Exercise, Manual, and Modality Logs for complete treatment.       Assessment/Plan : Pt tolerated today's session well. Pt progressed through activities well this date with no overt loss of balance. Pt demonstrates improved reciprocating arm swing with gait this date. Pt would benefit from continued skilled therapy to address deficits. Progress per plan of care.             Timed:  Manual Therapy:    0     mins  64461;  Therapeutic Exercise:    0     mins  16799;     Neuromuscular Candi:    23    mins  04140;    Therapeutic Activity:     15     mins  49494;     Gait Trainin     mins  71335;     Ultrasound:     0     mins  91146;    Aquatic Therapy    0     mins  35190;      Timed Treatment:   38   mins   Total Treatment:     38   mins    Sarah Retana   Physical Therapist Student    Electronically signed 2023    Supervised by:   Colby Berger PT  [place] License: PT - [id number]     Supervising therapist was active and present throughout the duration of the treatment session.

## 2023-08-08 NOTE — PROGRESS NOTES
Outpatient Speech Language Pathology   Adult Speech Voice Treatment Note       Patient Name: Kristi Ibrahim  : 1950  MRN: [id number]  Today's Date: 2023         Visit Date: 2023         Today Visit number: 6  Medical Diagnosis:PD  Treatment Diagnosis:hypofunctional dysphonia, PD      SUBJECTIVE  Patient Comments:    Patient indicates she is completing LSVT HEP.  She is completing HEP and increasing the cognitive load by peeling vegetables while completing some of her homework tasks to promote carryover into normal voicing.       Education:   Patient educated carryover tasks and HEP      OBJECTIVE  Patient is here today to receive voice therapy to increase her intensity of voice using the LSVT protocol to safely and effectively communicate wants and needs during IADLs.           SPEECH THERAPY      GOALS ACTIVITY PERFORMED TRIALS COMPLETED LEVEL OF CUEING REQUIRED   LTG 1:8 weeks:  The patient will demonstrate 1-19% limitation by achieving a level of 6 on the Functional Communication Measures for voice by completing the [facility] Voice Treatment program to increase vocal intensity for patient to safely communicate wants and needs and participate in vocational , avocation and social activities in low and high vocal demands with self monitoring and minimal situational variances.        STG 1a: 8 weeks:  Patient will increase vocal intensity to reach a target sound pressure level of 72dB SPL for 15 seconds or more during sustained phonation using proper breath support with min assist to promote safe communication of wants and needs during ADLS. Sustained phonation on /a/ while sorting cards by there suit 15 repetitions using the LOUD voice with an average sound pressure increased from   84.6 dB to 85.5 and average phonation time decreased from 23.1 to 17.5 seconds when cognitive load was increased.  Microphone was at 30cm     Mod assist to use the open throat, relaxed phonation (forward focused) to  "allow for voicing at the true vocal fold level.    STG 1b: 8 weeks:  Patient will increase vocal intensity to reach a target sound pressure level of 71dB SPL or more with min cues to increase loudness during reading at the word and sentence to increase vocal respiratory support to  increase vocal intensity for safe communication of wants and needs during ADLS.  Reading Grandpa phrases and sentences with patient stopped after every five phrases and asked to name three items in a category.     Reading  Grandpa sentences patient averaged 73.5 dB    Basic Functional phrases 5 sets of 10.  Patient maintained a 75.0 dB    Microphone at 30 cm Min to mod  assist for breath support and LOUD voicing   STG 1c: 8 weeks: Patient will reach a target sound pressure level of 68dB or more with min cueing to communicate effectively during simple structured conversational speech, which will help to increase vocal respiratory support for safe functional communication of wants and needs during ADLs.  Off the cuff responses      Reading questions and answering with mor than one word Patient averaged 70.4 dB with one word answers      Patient averaged 73.5 dB PEAK Min to moderate assist   STG1d: 8 weeks:  Patient will complete vocal range exercises to increase vocal cord flexibility to 1 or more octaves with min assist.  Sustain highest and lowest vocal pitch for five seconds 10 repetitions of high pitch with an average pitch increase from  472.9 Hz to to 528.5 at 81.4 dB with ability to hold pitch for 5 seconds on  12/15     10 repetitions of lowest pitch with an average pitch decreasing from 171.3 to 165.8 Hz at 77.7 dB.  She was able to hold the pitch for 5 seconds on 5/15 trials Mod cueing           Short two word phrase was used to stimulate decrease in pitch \"he haw and go Home\" to open the pharynx and use breath support to facilitate LOUD and low or high voicing.                  * Patient given a HEP to complete one time per day " when in therapy and two times a day if she does not have voice therapy.                         ASSESSMENT/PLAN  Need for skilled care:  Patient requires the skills of a therapist to provide reduced cueing to min to moderate  assist to increase vocal intensity, maximum phonation time and  increase pitch range to develop a strong voice for safe and intelligible conversations during IADLs.    Progress towards goals: Patient able to increase pitch range today.      Barriers to Achieving Goals:  none    Changes to Plan: continue plan of care      Marisa Inman MS, CCC-SLP     SIGNATURE: [employer]    Electronically signed 8/8/2023    [place] License:  - [id number]

## 2023-08-09 ENCOUNTER — TREATMENT (OUTPATIENT)
Dept: PHYSICAL THERAPY | Facility: CLINIC | Age: 73
End: 2023-08-09
Payer: MEDICARE

## 2023-08-09 DIAGNOSIS — R26.89 IMBALANCE: ICD-10-CM

## 2023-08-09 DIAGNOSIS — R49.0 HYPOFUNCTIONAL DYSPHONIA: Primary | ICD-10-CM

## 2023-08-09 DIAGNOSIS — G20 PARKINSON'S DISEASE: ICD-10-CM

## 2023-08-09 DIAGNOSIS — R26.9 GAIT DISTURBANCE: Primary | ICD-10-CM

## 2023-08-09 DIAGNOSIS — G20 PARKINSONS: ICD-10-CM

## 2023-08-09 PROCEDURE — 97530 THERAPEUTIC ACTIVITIES: CPT | Performed by: PHYSICAL THERAPIST

## 2023-08-09 PROCEDURE — 97112 NEUROMUSCULAR REEDUCATION: CPT | Performed by: PHYSICAL THERAPIST

## 2023-08-09 PROCEDURE — 97110 THERAPEUTIC EXERCISES: CPT | Performed by: PHYSICAL THERAPIST

## 2023-08-09 NOTE — PROGRESS NOTES
Outpatient Physical Therapy  [address] [facility], [place] [address]                            Physical Therapy Daily Treatment Note    Patient: Kristi Ibrahim   : 1950  Diagnosis/ICD-10 Code:  Gait disturbance [R26.9]  Referring practitioner: Ruperto Conley MD  Date of Initial Visit: Type: THERAPY  Noted: 2023  Today's Date: 2023  Patient seen for 6 sessions           Subjective   Kristi Ibrahim reports: that she has noticed getting out of the car is getting easier, she is singing in [employer]. States that her  feels like she is doing better.     Objective   No LOB throughout PT session.    See Exercise, Manual, and Modality Logs for complete treatment.     Assessment/Plan  Kristi tolerated LSVT BIG exercise well, required no assistance to perform exercises correctly. Pt would benefit from skilled PT to address strength and endurance deficits within LSVT BIG protocol, transfer and gait training and any concerns with ADLs.       Progress per Plan of Care         Timed:  Manual Therapy:         mins  06852;  Therapeutic Exercise:    15     mins  28716;     Neuromuscular Candi:    15    mins  83181;    Therapeutic Activity:     15     mins  45929;     Gait Training:           mins  02729;    Aquatic Therapy:          mins  16780;       Timed Treatment:   45   mins   Total Treatment:     45   mins      Electronically signed:   Estella Berger PTA  Physical Therapist Assistant  [facility] License #: [id number]  LSVT BIG Certified: [id number]

## 2023-08-09 NOTE — PROGRESS NOTES
Outpatient Speech Language Pathology   Adult Speech Voice Treatment Note       Patient Name: Kristi Ibrahim  : 1950  MRN: [id number]  Today's Date: 2023         Visit Date: 2023         Today Visit number: 6  Medical Diagnosis:PD  Treatment Diagnosis:hypofunctional dysphonia, PD      SUBJECTIVE  Patient Comments:    Patient indicates her  is commenting on her intensity of speech.  She indicates he is not asking her to repeat as often.      Patient is at the four hour meron between her medication and is noticeable in her vocal range and some of the intensity.       Education:   Patient educated carryover tasks and HEP  Use of Loud voice especially when not feeling well or when demonstrating any tremors.     OBJECTIVE  Patient is here today to receive voice therapy to increase her intensity of voice using the LSVT protocol to safely and effectively communicate wants and needs during IADLs.           SPEECH THERAPY      GOALS ACTIVITY PERFORMED TRIALS COMPLETED LEVEL OF CUEING REQUIRED   LTG 1:8 weeks:  The patient will demonstrate 1-19% limitation by achieving a level of 6 on the Functional Communication Measures for voice by completing the [facility] Voice Treatment program to increase vocal intensity for patient to safely communicate wants and needs and participate in vocational , avocation and social activities in low and high vocal demands with self monitoring and minimal situational variances.        STG 1a: 8 weeks:  Patient will increase vocal intensity to reach a target sound pressure level of 72dB SPL for 15 seconds or more during sustained phonation using proper breath support with min assist to promote safe communication of wants and needs during ADLS. Sustained phonation on /a/ with no additional cognitive load  10 repetitions using the LOUD voice with an average sound pressure decreased to 83.5 dB  average phonation time of 19.4 seconds with decreased cognitive load  Microphone  was at 30cm     Mod to max assist to use the open throat, relaxed phonation (forward focused) to allow for voicing at the true vocal fold level.    STG 1b: 8 weeks:  Patient will increase vocal intensity to reach a target sound pressure level of 71dB SPL or more with min cues to increase loudness during reading at the word and sentence to increase vocal respiratory support to  increase vocal intensity for safe communication of wants and needs during ADLS.  Reading Grandma phrases and sentences with decreased cognitive today   Reading  Grandma sentences patient averaged 78.9 dB    Basic Functional phrases 5 sets of 10.  Patient maintained a 78.0 dB    Microphone at 30 cm Min to mod  assist for breath support and LOUD voicing   STG 1c: 8 weeks: Patient will reach a target sound pressure level of 68dB or more with min cueing to communicate effectively during simple structured conversational speech, which will help to increase vocal respiratory support for safe functional communication of wants and needs during ADLs.  Off the cuff responses      Answering questions in conversation with therapist while walking Patient average decreased from  70.4 to 66.8 dB       Patient averaged 65.3 dB PEAK Min to moderate assist   STG1d: 8 weeks:  Patient will complete vocal range exercises to increase vocal cord flexibility to 1 or more octaves with min assist.  Sustain highest and lowest vocal pitch for five seconds 10 repetitions of high pitch with an average pitch decreased 528.5 at 81.4 dB to 464.5 at 79.3 dB at with ability to hold pitch for 5 seconds on  10/10     10 repetitions of lowest pitch with an average pitch holding at  165.6 Hz at 78.7  dB.  She was able to hold the pitch for 5 seconds on 1/10 trials Mod cueing to use LOUD cueing to start the glide and glide up/down to highest/lowest note and hold for 5 seconds using breath support.                            * Patient given a HEP to complete one time per day when in  therapy and two times a day if she does not have voice therapy.                         ASSESSMENT/PLAN  Need for skilled care:  Patient requires the skills of a therapist to provide increased cueing moderate  assist to increase vocal intensity, maximum phonation time and  increase pitch range to develop a strong voice for safe and intelligible conversations during IADLs.    Progress towards goals: Patient indicates her  is not asking her to repeat as much and  had positive comments about her voice.     Barriers to Achieving Goals:  none    Changes to Plan: continue plan of care      Marisa Inman MS, CCC-SLP     SIGNATURE: [employer]    Electronically signed 8/9/2023    [place] License:  - [id number]

## 2023-08-10 ENCOUNTER — TREATMENT (OUTPATIENT)
Dept: PHYSICAL THERAPY | Facility: CLINIC | Age: 73
End: 2023-08-10
Payer: MEDICARE

## 2023-08-10 DIAGNOSIS — R49.0 HYPOFUNCTIONAL DYSPHONIA: Primary | ICD-10-CM

## 2023-08-10 DIAGNOSIS — G20 PARKINSON'S DISEASE: ICD-10-CM

## 2023-08-10 NOTE — PROGRESS NOTES
Outpatient Speech Language Pathology   Adult Speech Voice Treatment Note       Patient Name: Kristi Ibrahim  : 1950  MRN: [id number]  Today's Date: 8/10/2023         Visit Date: 08/10/2023         Today Visit number: 7  Medical Diagnosis:PD  Treatment Diagnosis:hypofunctional dysphonia, PD      SUBJECTIVE  Patient Comments:    Patient indicates her Ldopa medication last dose was 11:45 a.m.  Patient indicated she was talking on the phone with her daughter and her daughter indicated her voice was stronger.         Education:   Patient educated carryover tasks and HEP       OBJECTIVE  Patient is here today to receive voice therapy to increase her intensity of voice using the LSVT protocol to safely and effectively communicate wants and needs during IADLs.           SPEECH THERAPY      GOALS ACTIVITY PERFORMED TRIALS COMPLETED LEVEL OF CUEING REQUIRED   LTG 1:8 weeks:  The patient will demonstrate 1-19% limitation by achieving a level of 6 on the Functional Communication Measures for voice by completing the [facility] Voice Treatment program to increase vocal intensity for patient to safely communicate wants and needs and participate in vocational , avocation and social activities in low and high vocal demands with self monitoring and minimal situational variances.        STG 1a: 8 weeks:  Patient will increase vocal intensity to reach a target sound pressure level of 72dB SPL for 15 seconds or more during sustained phonation using proper breath support with min assist to promote safe communication of wants and needs during ADLS. Sustained phonation on /a/ with no additional cognitive load  15 repetitions using the LOUD voice with an average sound pressure of  to 82.1 dB  average phonation time increased to 21.2 seconds with decreased cognitive load  Microphone was at 30cm     Mod assist   STG 1b: 8 weeks:  Patient will increase vocal intensity to reach a target sound pressure level of 71dB SPL or more with  "min cues to increase loudness during reading at the word and sentence to increase vocal respiratory support to  increase vocal intensity for safe communication of wants and needs during ADLS.  Reading Grandma phrases and sentences with decreased cognitive today   Reading  new material \"Senior Moments \" and averaged 77.0 dB    Basic Functional phrases 5 sets of 10.  Patient increased to 80.3 dB    Microphone at 30 cm Min to mod  assist for breath support and LOUD voicing   STG 1c: 8 weeks: Patient will reach a target sound pressure level of 68dB or more with min cueing to communicate effectively during simple structured conversational speech, which will help to increase vocal respiratory support for safe functional communication of wants and needs during ADLs.    Question and answer session Patient average decreased from  70.4 to 66.8 dB       Patient averaged 73.0 dB PEAK sound pressure Min to moderate assist   STG1d: 8 weeks:  Patient will complete vocal range exercises to increase vocal cord flexibility to 1 or more octaves with min assist.  Sustain highest and lowest vocal pitch for five seconds 15 repetitions of high pitch with an average pitch decreased to 455Hertz at 78.5 dB  at with ability to hold pitch for 5 seconds on  13/15    15 repetitions of lowest pitch with an average pitch holding at  170.7Hz at 77.8 dB.  She was able to hold the pitch for 5 seconds on 0/15 trials Mod cueing to use LOUD cueing to start the glide and glide up/down to highest/lowest note and hold for 5 seconds using breath support.                            * Patient given a HEP to complete one time per day when in therapy and two times a day if she does not have voice therapy.                         ASSESSMENT/PLAN  Need for skilled care:  Patient requires the skills of a therapist to provide increased cueing moderate  assist to increase vocal intensity, maximum phonation time and  increase pitch range to develop a strong voice for " safe and intelligible conversations during IADLs.    Progress towards goals: Patient indicates her daughter commented that her voice is stronger on the phone.      Barriers to Achieving Goals:  none    Changes to Plan: continue plan of care      Marisa Inman MS, CCC-SLP     SIGNATURE: [employer]    Electronically signed 8/10/2023    [place] License:  - [id number]

## 2023-08-11 ENCOUNTER — TREATMENT (OUTPATIENT)
Dept: PHYSICAL THERAPY | Facility: CLINIC | Age: 73
End: 2023-08-11
Payer: MEDICARE

## 2023-08-11 DIAGNOSIS — G20 PARKINSON'S DISEASE: ICD-10-CM

## 2023-08-11 DIAGNOSIS — R26.9 GAIT DISTURBANCE: Primary | ICD-10-CM

## 2023-08-11 DIAGNOSIS — R26.89 IMBALANCE: ICD-10-CM

## 2023-08-11 PROCEDURE — 97530 THERAPEUTIC ACTIVITIES: CPT | Performed by: PHYSICAL THERAPIST

## 2023-08-11 PROCEDURE — 97112 NEUROMUSCULAR REEDUCATION: CPT | Performed by: PHYSICAL THERAPIST

## 2023-08-11 NOTE — PROGRESS NOTES
Physical Therapy Daily Treatment Note  [address] [place] [id number]    Patient: Kristi Ibrahim   : 1950  Diagnosis/ICD-10 Code:  Gait disturbance [R26.9]  Referring practitioner: Ruperto Conley MD  Date of Initial Visit: Type: THERAPY  Noted: 2023  Today's Date: 2023  Patient seen for 8 sessions           Subjective : Patient reports she has been doing well, feels like therapy has been helpful.    Objective   See Exercise, Manual, and Modality Logs for complete treatment.       Assessment/Plan : Pt tolerated today's session well. Continued with progression of balance and endurance training. Pt would benefit from continued skilled therapy to address deficits. Progress per plan of care.             Timed:  Manual Therapy:    0     mins  76346;  Therapeutic Exercise:    0     mins  11252;     Neuromuscular Candi:    20    mins  12752;    Therapeutic Activity:     10     mins  75583;     Gait Trainin     mins  70651;     Ultrasound:     0     mins  36537;    Aquatic Therapy    0     mins  06091;    Untimed:  Electrical Stimulation:    0     mins  29326 ( );  Dry Needling     0     mins self-pay;  Mechanical Traction    0     mins  09825  Moist Heat     0     mins  No charge  Canalith Repos              0     mins 16352    Timed Treatment:   30   mins   Total Treatment:     30   mins    Colby Berger PT  Physical Therapist    Electronically signed 2023    [place] License: PT - [id number]

## 2023-08-14 ENCOUNTER — TREATMENT (OUTPATIENT)
Dept: PHYSICAL THERAPY | Facility: CLINIC | Age: 73
End: 2023-08-14
Payer: MEDICARE

## 2023-08-14 DIAGNOSIS — R26.89 IMBALANCE: ICD-10-CM

## 2023-08-14 DIAGNOSIS — R26.9 GAIT DISTURBANCE: Primary | ICD-10-CM

## 2023-08-14 DIAGNOSIS — G20 PARKINSON'S DISEASE: ICD-10-CM

## 2023-08-14 PROCEDURE — 97530 THERAPEUTIC ACTIVITIES: CPT | Performed by: PHYSICAL THERAPIST

## 2023-08-14 PROCEDURE — 97110 THERAPEUTIC EXERCISES: CPT | Performed by: PHYSICAL THERAPIST

## 2023-08-14 PROCEDURE — 97112 NEUROMUSCULAR REEDUCATION: CPT | Performed by: PHYSICAL THERAPIST

## 2023-08-14 RX ORDER — NEBIVOLOL 2.5 MG/1
TABLET ORAL
Qty: 90 TABLET | Refills: 0 | Status: SHIPPED | OUTPATIENT
Start: 2023-08-14

## 2023-08-14 NOTE — PROGRESS NOTES
Outpatient Physical Therapy  [address] [facility], [place] [address]                            Physical Therapy Daily Treatment Note    Patient: Kristi Ibrahim   : 1950  Diagnosis/ICD-10 Code:  Gait disturbance [R26.9]  Referring practitioner: Ruperto Conley MD  Date of Initial Visit: Type: THERAPY  Noted: 2023  Today's Date: 2023  Patient seen for 9 sessions           Subjective   Kristi Ibrahim reports: that she is a little dizzy this morning, but she also drove up to [facility] and back to get a car over the week.     Objective   No LOB throughout LSVT BIG exercises.    See Exercise, Manual, and Modality Logs for complete treatment.     Assessment/Plan  Kristi tolerated LSVT BIG exercise well, required Stand by Assist for Safety to perform exercises correctly. Pt would benefit from skilled PT to address strength and endurance deficits within LSVT BIG protocol, transfer and gait training and any concerns with ADLs.     Progress per Plan of Care         Timed:  Manual Therapy:         mins  08739;  Therapeutic Exercise:    15     mins  59804;     Neuromuscular Candi:    15    mins  86507;    Therapeutic Activity:     15     mins  29901;     Gait Training:           mins  70963;    Aquatic Therapy:          mins  31070;       Timed Treatment:   45   mins   Total Treatment:     45   mins      Electronically signed:   Estella Berger PTA  Physical Therapist Assistant  [facility] License #: [id number]  LSVT BIG Certified: [id number]

## 2023-08-15 ENCOUNTER — TREATMENT (OUTPATIENT)
Dept: PHYSICAL THERAPY | Facility: CLINIC | Age: 73
End: 2023-08-15
Payer: MEDICARE

## 2023-08-15 DIAGNOSIS — G20 PARKINSON'S DISEASE: ICD-10-CM

## 2023-08-15 DIAGNOSIS — R49.0 HYPOFUNCTIONAL DYSPHONIA: Primary | ICD-10-CM

## 2023-08-15 DIAGNOSIS — R26.9 GAIT DISTURBANCE: Primary | ICD-10-CM

## 2023-08-15 DIAGNOSIS — R26.89 IMBALANCE: ICD-10-CM

## 2023-08-15 NOTE — PROGRESS NOTES
"Outpatient Speech Language Pathology   Adult Speech Language  Re-Evaluation       Patient Name: Kristi Ibrahim  : 1950  MRN: [id number]  Today's Date: 8/15/2023        Visit Date: 08/15/2023   Patient Active Problem List   Diagnosis    Abnormal cardiovascular stress test    Primary hypertension    CAD S/P percutaneous coronary angioplasty    Presence of stent in coronary artery    SSS (sick sinus syndrome)    Palpitations    Anemia, unspecified    Arthritis    Asthma    Degeneration of lumbar intervertebral disc    Left lumbar radiculopathy    Lumbar spondylosis    Dizziness    Dyskinesia    Dystonia    GERD (gastroesophageal reflux disease)    Hemangioma    Hepatitis A    Leg pain    Leg swelling    Leg weakness, bilateral    Long-term current use of opiate analgesic    Muscle cramps    Parkinson disease    Seasonal allergic rhinitis    Shortness of breath    Spondylolisthesis of cervical region    Migraine with aura and without status migrainosus, not intractable    Postmenopausal    Sinus infection    Localized edema    Other hyperlipidemia    Dysuria    Alopecia    Restless leg syndrome        Past Medical History:   Diagnosis Date    Abnormal ECG     Allergic     Anemia, unspecified     Arthritis     Asthma     \"ALLERGIC\"    Atrial fibrillation     Benign essential hypertension     Cataract     Coronary artery disease     Dental disease     Diverticulosis     Dizziness     Fibromyalgia     Fibromyalgia, primary     GERD (gastroesophageal reflux disease)     H/O degenerative disc disease     Headache     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    Hepatitis A     HL (hearing loss)     Injury of back     Injury of neck     Leg pain     Leg swelling     Leg weakness, bilateral 09/10/2019    Low back pain 2020    Lumbar herniated disc     Lumbar stenosis 09/10/2019    Mitral valve insufficiency     Muscle cramps     Osteopenia     Parkinson's disease     Scoliosis     Seasonal allergic rhinitis     Shortness " of breath     Sinus infection 03/31/2022    Spondylolisthesis of cervical region 09/10/2019    Spondylolisthesis of lumbar region 08/25/2020    Tricuspid regurgitation     Urinary tract infection     Voice hoarseness         Past Surgical History:   Procedure Laterality Date    ADENOIDECTOMY      BREAST SURGERY  Multiple cysts removed    CARDIAC CATHETERIZATION      CARDIAC CATHETERIZATION N/A 04/23/2018    Procedure: Left Heart Cath;  Surgeon: Malina Breen MD;  Location:  [facility];  Service: Cardiovascular    CARDIAC CATHETERIZATION N/A 04/23/2018    Procedure: Coronary angiography;  Surgeon: Malina Breen MD;  Location:  [facility];  Service: Cardiovascular    CARDIAC CATHETERIZATION N/A 04/23/2018    Procedure: Left ventriculography;  Surgeon: Malina Breen MD;  Location:  [facility];  Service: Cardiovascular    CARPAL TUNNEL RELEASE Bilateral     CHOLECYSTECTOMY      COLONOSCOPY      CORONARY ANGIOPLASTY      CORONARY STENT PLACEMENT      EYE SURGERY  Cateracts    FOOT SURGERY      HAND SURGERY      KNEE ARTHROSCOPY Left     OTHER SURGICAL HISTORY      JOINT SURGERY    RIB BIOPSY, RIB RESECTION      SPINAL FUSION      TONSILLECTOMY AND ADENOIDECTOMY      TUBAL ABDOMINAL LIGATION           Visit Dx:    ICD-10-CM ICD-9-CM   1. Hypofunctional dysphonia  R49.0 784.49   2. Parkinson's disease  G20 332.0           Report Period  Physician: Ruperto Conley MD  Date of Service: 8/15/23  Report Period from:  7/31/23  Report Period to: 08/15/23   Reason For Re-Evaluation:  Change goals and add EMST breather to the plan of care    Objective  Diagnosis: Parkinson's Disease  Treatment Diagnosis: Hypofunctional Dysphonia, PD  Today's Treatment  After re-evaluation, patient continued with speech therapy with the following  results:    Patient continued after re-evaluation to use LOUD voice at 50 cm from microphone to read about travels and answer  questions.  Patient required moderate assist by visual cueing to use her LOUD voice in carryover activities.  Additionally, patient was educated on last two weeks of therapy, the need to increase cognitive load while completing exercises to promote carryover into everyday activities while voicing.    Patient completed 5 sets of 5 repetitions using the EMST 150 breather at 40 cm H2O (3/4 turn) and her MEP was 55 cmH2O (1 1/2 turns).        Date of Service  DATE: 8/15/23  Today's Visit Number Speech:  8    ST Functional Testing  Results  Patient rated on FRANCIS's Functional Communication Measures for Voice  at a level 5/7with a 20-39%  limitation.  With continued speech therapy, patient is expected to reach a Functional Communication Level of  6/7 for voice decreasing  limitation to 1-19%.    Assessment Given    GOALS ACTIVITY PERFORMED TRIALS COMPLETED LEVEL OF CUEING REQUIRED   LTG 1:8 weeks:  The patient will demonstrate 1-19% limitation by achieving a level of 6 on the Functional Communication Measures for voice by completing the [facility] Voice Treatment program to increase vocal intensity for patient to safely communicate wants and needs and participate in vocational , avocation and social activities in low and high vocal demands with self monitoring and minimal situational variances.        STG 1a: 8 weeks:  Patient will increase vocal intensity to reach a target sound pressure level of 72dB SPL for 15 seconds or more during sustained phonation using proper breath support with min assist to promote safe communication of wants and needs during ADLS. Sustained phonation on /a/ with no additional cognitive load  10 repetitions using the LOUD voice with an average sound pressure of  76.9 dB  average phonation time increased to 22.3 seconds with decreased cognitive load  Microphone was at 50 cm     Min assist    Goal met   STG 1b: 8 weeks:  Patient will increase vocal intensity to reach a target sound pressure  level of 71dB SPL or more with min cues to increase loudness during reading at the word and sentence to increase vocal respiratory support to  increase vocal intensity for safe communication of wants and needs during ADLS.  Reading Grandma phrases and sentences with no cognitive load   Reading  Functional phrases 75.5dB  Microphone at 50 cm Min  assist for breath support and LOUD voicing      Goal met   STG 1c: 8 weeks: Patient will reach a target sound pressure level of 68dB or more with min cueing to communicate effectively during simple structured conversational speech, which will help to increase vocal respiratory support for safe functional communication of wants and needs during ADLs.    Question and answer session Patient average 70.4 dB     Min  assist    Goal met   STG1d: 8 weeks:  Patient will complete vocal range exercises to increase vocal cord flexibility to 1 or more octaves with min assist.  Sustain highest and lowest vocal pitch for five seconds 15 repetitions of high pitch with an average pitch increased to 496.8 from 455 Hertz.  She sustained pitch at 77.8 dB  at with ability to hold pitch for 5 seconds on  15/15    15 repetitions of lowest pitch with an average pitch holding decreased to 167.0 Hz from  170.7Hz at 72.4 dB.       Mod assist for respiratory support.  Patient continues to progress but is stimuable to increase vocal cord vocal range using loud techniques.                            * Patient given a HEP to complete one time per day when in therapy and two times a day if she does not have voice therapy.                         Assessment  Rehabilitation potential:  Excellent  Reason for Continued Therapy:  Patient requires the skills of a therapist to provide min assist to increase vocal intensity of speech with no cognitive load.    There is an expectation that through continued skilled therapy, the patient will achieve the remaining goals below.    Goals  PROBLEMS:  1. Voice  Functional Limitation                           LTG 1:8 weeks:  The patient will demonstrate 1-19% limitation by achieving a level of 6 on the Functional Communication Measures for voice by completing the [facility] Voice Treatment program to increase vocal intensity for patient to safely communicate wants and needs and participate in vocational , avocation and social activities in low and high vocal demands with self monitoring and minimal situational variances.                            STATUS:  Progressing              STG 1a: 8 weeks:  Patient will increase vocal intensity to reach a target sound pressure level of 72dB SPL for 15 seconds or more during sustained phonation using proper breath support with min assist to promote safe communication of wants and needs during ADLS.              STATUS: Met              STG 1b: 8 weeks:  Patient will increase vocal intensity to reach a target sound pressure level of 71dB SPL or more with min cues to increase loudness during reading at the word and sentence to increase vocal respiratory support to  increase vocal intensity for safe communication of wants and needs during ADLS.               STATUS: Met              STG 1c: 8 weeks: Patient will reach a target sound pressure level of 68dB or more with min cueing to communicate effectively during simple structured conversational speech, which will help to increase vocal respiratory support for safe functional communication of wants and needs during ADLs.                STATUS: Met              STG1d: 8 weeks:  Patient will complete vocal range exercises to increase vocal cord flexibility to 1 or more octaves with min assist.               STATUS: Progressing   STG 1e: 4 weeks: Patient will use the EMST 150 breather to increase coordination and breath support for Loud voicing with min assist.    STATUS: New   STG 1f:  4 weeks:Patient will sustain /a/ for 20+ seconds at 75 or higher dB with microphone at 50 cm or more   with min assist while completing a task to promote carryover into IADLs.    STATUS: New   STG 1 weeks: Patient will read at the paragraph level for 5 or more minutes with a average peak intensity level of 75.0 dB or higher with moderately complex passages and microphone at 50 cm  or more.     STATUS: New   STG 1h: 4 weeks:  Patient will answer questions and participate in unstructured conversations while completing tasks with SPL Peak or average of 70.0 dB with microphone at 50 cm or further with min assist.   STATUS: New                Treatment: Speech Therapy using the [facility] Voice Treatment protocol administered by a certified clinician to increase vocal intensity for safe communication of wants and needs during ADLs.     PLAN  Frequency (Times/Week):  4 times per week  Duration (Weeks):  2-4 weeks    Marisa Inman MS, CCC-SLP    ST SIGNATURE; Marisa Inman SLP    Electronically signed 8/15/2023    [place] License: ST - [id number]      Change of goals   Certification Period: 9/15/23  I certify that the therapy services are furnished while this patient is under my care.  The services outlined above are required by this patient, and will be reviewed every 90 days.     PHYSICIAN: Ruperto Conley MD      NPI: [id number]  DATE:     Please sign and return via fax to [phone].. Thank you, [facility]

## 2023-08-15 NOTE — PROGRESS NOTES
Physical Therapy Daily Treatment Note  [address] [place] [address]    Patient: Kristi Ibrahim   : 1950  Diagnosis/ICD-10 Code:  Gait disturbance [R26.9]  Referring practitioner: Ruperto Conley MD  Date of Initial Visit: Type: THERAPY  Noted: 2023  Today's Date: 8/15/2023  Patient seen for 10 sessions           Subjective : Patient reports that she is doing well today and has no new complaints. She reports that she has to go to [place] for her botox injections after this appointment.     Objective   See Exercise, Manual, and Modality Logs for complete treatment.       Assessment/Plan : Pt tolerated today's session well. Pt progressed through activities without overt difficulty. Pt able to navigate a variety of tasks with cognitive dual tasking. Pt would benefit from continued skilled therapy to address deficits. Progress per plan of care.             Timed:  Manual Therapy:    0     mins  24032;  Therapeutic Exercise:    0     mins  76664;     Neuromuscular Candi:    25    mins  15982;    Therapeutic Activity:     14     mins  92176;     Gait Trainin     mins  29829;     Ultrasound:     0     mins  43877;    Aquatic Therapy    0     mins  96987;      Timed Treatment:   39   mins   Total Treatment:     39   mins    Sarah Reatna   Physical Therapist Student    Electronically signed 8/15/2023    Supervised by:   Colby Berger PT  [place] License: [id number] - [id number]       Supervising therapist was active and present throughout the duration of the treatment session.

## 2023-08-16 ENCOUNTER — TREATMENT (OUTPATIENT)
Dept: PHYSICAL THERAPY | Facility: CLINIC | Age: 73
End: 2023-08-16
Payer: MEDICARE

## 2023-08-16 DIAGNOSIS — R49.0 HYPOFUNCTIONAL DYSPHONIA: Primary | ICD-10-CM

## 2023-08-16 DIAGNOSIS — G20 PARKINSONS: ICD-10-CM

## 2023-08-16 DIAGNOSIS — R26.9 GAIT DISTURBANCE: Primary | ICD-10-CM

## 2023-08-16 DIAGNOSIS — R26.89 IMBALANCE: ICD-10-CM

## 2023-08-16 DIAGNOSIS — G20 PARKINSON'S DISEASE: ICD-10-CM

## 2023-08-16 PROCEDURE — 97530 THERAPEUTIC ACTIVITIES: CPT | Performed by: PHYSICAL THERAPIST

## 2023-08-16 PROCEDURE — 97112 NEUROMUSCULAR REEDUCATION: CPT | Performed by: PHYSICAL THERAPIST

## 2023-08-16 PROCEDURE — 97110 THERAPEUTIC EXERCISES: CPT | Performed by: PHYSICAL THERAPIST

## 2023-08-16 NOTE — PROGRESS NOTES
Outpatient Adult Speech Language Therapy           Treatment Note    Patient: Kristi Ibrahim                                                                                     Visit Date: 2023  :     1950    Referring practitioner:    Ruperto Conley MD  Date of Initial Visit:          Type: THERAPY  Noted: 2023    Patient seen for 9 sessions    Visit Diagnoses:    ICD-10-CM ICD-9-CM   1. Hypofunctional dysphonia  R49.0 784.49   2. Parkinson's disease  G20 332.0     SUBJECTIVE     Patient is here today to complete the LSVT LOUD protocol to increase intensity of speech and carryover the LOUD technique to into everyday conversation.      Education: Progress, HEP    OBJECTIVE   GOALS    GOALS ACTIVITY PERFORMED TRIALS COMPLETED LEVEL OF CUEING REQUIRED   STG1d: 8 weeks:  Patient will complete vocal range exercises to increase vocal cord flexibility to 1 or more octaves with min assist.    Vocal range 10 repetitions of each exercise:  -high pitch and hold for five seconds  516.2 Hertz at 80.4 dB    -low pitch and hold for five seconds  176.4 Hertz at 77.7 dB    Exercise was combined with standing up as she voice the high or low pitch Min to mod assist for breath support and motor coordination   STG 1e: 4 weeks: Patient will use the EMST 150 breather to increase coordination and breath support for Loud voicing with min assist.    EMST breather Patient completed 5 sets of 5 repetitions using the EMST 150 breather at 40 cm H2O (3/4 turn) and her MEP was 55 cmH2O (1 1/2 turns).  Moderate assist to prep for EMST breather by forcefully blowing out without tension.   STG 1f:  4 weeks:Patient will sustain /a/ for 20+ seconds at 75 or higher dB with microphone at 50 cm or more  with min assist while completing a task to promote carryover into IADLs.  Sustained /a/ Patient completed 10 repetitions of /a/ at 50 cm while completing a large  "muscle motor movement of lunging left or right with average phonation time of 19.1 seconds at 80.4dB Min to mod assist to coordinate motor movement with voicing while using the LOUD voicing technique   STG 1 weeks: Patient will read at the paragraph level for 5 or more minutes with a average peak intensity level of 75.0 dB or higher with moderately complex passages and microphone at 50 cm  or more.   Paragraph level Patient read a multiple paragraphs about gardening with microphone at 50+cm for five minutes.  Intensity of speech averaged 75.4 dB    No cognitive or large muscle movement was included for carryover this session Min to mod assist to continue to use LOUD voicing throughout the five minutes.    STG 1h: 4 weeks:  Patient will answer questions and participate in unstructured conversations while completing tasks with SPL Peak or average of 70.0 dB with microphone at 50 cm or further with min assist.   conversations Reading random questions and answering questions with microphone at 50 cm+    Patient averaged 70dB      Off the cuff answers- ranged from 67-74 dB - topic ( deprogramming the car dey phob and waiting for a tow truck Moderate cueing to use her LOUD voice throughout the 5 minute activity.                Min assist as patient used her \"feelings\" to aid in producing a LOUD voice.       ASSESSMENT/PLAN     ASSESSMENT: Patient requires the skills of a therapist to provide min to mod assist to increase intensity and vocal range for safe voicing of wants and needs during IADLs.   Progress:    PLAN: Continue plan of care      SIGNATURE: Marisa Inman, SLP, [place] License #: [id number]  Electronically Signed on 2023            Adult Outpatient Rehabiliation                                             [address] [place] [address]. [address]  [phone] Office  [phone] Fax                 "

## 2023-08-16 NOTE — PROGRESS NOTES
Outpatient Physical Therapy  [address] [facility], [place] [address]                            Physical Therapy Daily Treatment Note    Patient: Kristi Ibrahim   : 1950  Diagnosis/ICD-10 Code:  Gait disturbance [R26.9]  Referring practitioner: Ruperto Conley MD  Date of Initial Visit: Type: THERAPY  Noted: 2023  Today's Date: 2023  Patient seen for 11 sessions           Subjective   Kristi Ibrahim reports: that she has been doing her exercises at home, states that she even drove herself to PT today.    Objective   No LOB throughout session    See Exercise, Manual, and Modality Logs for complete treatment.     Assessment/Plan  Kristi progressing as evident by decreased falls and increased tolerance of PT session. Pt required no assistance throughout PT session, no LOB. Pt would benefit from skilled PT to address strength and endurance deficits, transfer and gait training and any concerns with ADLs.     Progress per Plan of Care         Timed:  Manual Therapy:         mins  07798;  Therapeutic Exercise:    15     mins  29647;     Neuromuscular Candi:    15    mins  18171;    Therapeutic Activity:     15     mins  99779;     Gait Training:           mins  69696;    Aquatic Therapy:          mins  31728;       Timed Treatment:   45   mins   Total Treatment:     45   mins      Electronically signed:   Estella Berger PTA  Physical Therapist Assistant  [facility] License #: [id number]  LSVT BIG Certified: [id number]

## 2023-08-18 ENCOUNTER — TREATMENT (OUTPATIENT)
Dept: PHYSICAL THERAPY | Facility: CLINIC | Age: 73
End: 2023-08-18
Payer: MEDICARE

## 2023-08-18 DIAGNOSIS — R49.0 HYPOFUNCTIONAL DYSPHONIA: Primary | ICD-10-CM

## 2023-08-18 DIAGNOSIS — R26.9 GAIT DISTURBANCE: ICD-10-CM

## 2023-08-18 DIAGNOSIS — G20 PARKINSON'S DISEASE: Primary | ICD-10-CM

## 2023-08-18 DIAGNOSIS — R26.89 IMBALANCE: ICD-10-CM

## 2023-08-18 PROCEDURE — 97530 THERAPEUTIC ACTIVITIES: CPT | Performed by: PHYSICAL THERAPIST

## 2023-08-18 PROCEDURE — 97112 NEUROMUSCULAR REEDUCATION: CPT | Performed by: PHYSICAL THERAPIST

## 2023-08-18 PROCEDURE — 97110 THERAPEUTIC EXERCISES: CPT | Performed by: PHYSICAL THERAPIST

## 2023-08-18 NOTE — PROGRESS NOTES
Outpatient Adult Speech Language Therapy           Treatment Note    Patient: Kristi Ibrahim                                                                                     Visit Date: 2023  :     1950    Referring practitioner:    Ruperto Conley MD  Date of Initial Visit:          Type: THERAPY  Noted: 2023    Patient seen for 10 sessions    Visit Diagnoses:    ICD-10-CM ICD-9-CM   1. Hypofunctional dysphonia  R49.0 784.49     SUBJECTIVE     Patient is here today to complete the LSVT LOUD protocol to increase intensity of speech and carryover the LOUD technique to into everyday conversation.      Patient took her medication for PD approximately 3.5 hours prior to therapy.      Education: Progress, HEP    OBJECTIVE   GOALS    GOALS ACTIVITY PERFORMED TRIALS COMPLETED LEVEL OF CUEING REQUIRED   STG1d: 8 weeks:  Patient will complete vocal range exercises to increase vocal cord flexibility to 1 or more octaves with min assist.    Vocal range 15 repetitions of each exercise:  -high pitch and hold for five seconds decreased to 480.9 hertz at 82.2 dB from   516.2 Hertz at 80.4 dB    -low pitch and hold for five seconds decreased to 171.1 at 72.9 dB from   176.4 Hertz at 77.7 dB   Min to mod assist for breath support and motor coordination   STG 1e: 4 weeks: Patient will use the EMST 150 breather to increase coordination and breath support for Loud voicing with min assist.    EMST breather Patient completed 5 sets of 5 repetitions using the EMST 150 breather at 40 cm H2O (3/4 turn) and her MEP was 55 cmH2O (1 1/2 turns).  Min to moderate assist to prep for EMST breather by forcefully blowing out without tension.   STG 1f:  4 weeks:Patient will sustain /a/ for 20+ seconds at 75 or higher dB with microphone at 50 cm or more  with min assist while completing a task to promote carryover into IADLs.  Sustained /a/ Patient completed  10 repetitions of /a/ at 50 cm while sorting cards with average phonation time of 19.4 seconds at 82.2dB Min assist to coordinate motor movement with voicing while using the LOUD voicing technique   STG 1 weeks: Patient will read at the paragraph level for 5 or more minutes with a average peak intensity level of 75.0 dB or higher with moderately complex passages and microphone at 50 cm  or more.   Paragraph level Patient read four lengthy paragraphs with sound pressure meter at 50+cm for five minutes.  Intensity of speech averaged 74.5 dB    Patient stopped throughout the paragraph and asked multiple moderately complex comprehension questions Min assist to continue to use LOUD voicing throughout the five minutes.    STG 1h: 4 weeks:  Patient will answer questions and participate in unstructured conversations while completing tasks with SPL Peak or average of 70.0 dB with microphone at 50 cm or further with min assist.   conversations Patient recalling information she read in paragraphs from her HEP last night SP meter at 50 cm+ while tossing a ball    Patient averaged 67dB      Off the cuff answers- 68-71 dB Moderate cueing to use her LOUD voice throughout the 5 minute activity.            Min to mod assist to use her LOUD voicing every time she uses her voice.           ASSESSMENT/PLAN     ASSESSMENT: Patient requires the skills of a therapist to provide min to mod assist to increase intensity and vocal range for safe voicing of wants and needs during IADLs.   Progress:  Patient indicated she spoke with her sister last evening on the phone and her sister commented how clear and strong her voice was.    PLAN: Continue plan of care      SIGNATURE: Marisa Inman, SLP, [place] License #: [id number]  Electronically Signed on 2023            Adult Outpatient Rehabiliation                                             [address] [place] [address]. [address]  [phone] Office  [phone] Fax

## 2023-08-18 NOTE — PROGRESS NOTES
Progress Assessment  [address] [place] [id number]        Patient: Kristi Ibrahim   : 1950  Diagnosis/ICD-10 Code:  Parkinson's disease [G20]  Referring practitioner: Ruperto Conley MD  Date of Initial Visit: Type: THERAPY  Noted: 2023  Today's Date: 2023  Patient seen for 12 sessions      Subjective:     Subjective Questionnaire: TU.11 sec, 11.86 sec, 11.63 sec = 11.53 sec average    Clinical Progress: improved  Home Program Compliance: Yes  Treatment has included: therapeutic exercise, neuromuscular re-education, and therapeutic activity    Subjective : Kristi Ibrahim reports: Pt reports she feels like therapy has been very helpful. She can tell she is taking bigger steps, walking farther, overall her daily activities are easier. She is wanting to continue with therapy.    Current Pain 0/10      Objective   Static Posture      Comments  Rounded shoulders, protracted scapulae, increased thoracic kyphosis, decreased lumbar lordosis/posterior pelvic tilt. Mild tremor of LUE with activity.      Active Range of Motion   Left Shoulder   Flexion: WFL  Abduction: WFL     Right Shoulder   Flexion: WFL  Abduction: WFL     Strength/Myotome Testing      Left Shoulder      Planes of Motion   Flexion: 4+  Abduction: 4+   External rotation at 0ø: 4+  Internal rotation at 0ø: 4+     Right Shoulder      Planes of Motion   Flexion: 4+   Abduction: 4+   External rotation at 0ø: 4+   Internal rotation at 0ø: 4+     Left Elbow   Flexion: 4+  Extension: 4+     Right Elbow   Flexion: 4+  Extension: 4+     Left Hip   Planes of Motion   Flexion: 5  Extension: 4+  Abduction: 4  Adduction: 4     Right Hip   Planes of Motion   Flexion: 4+  Extension: 4+  Abduction: 4  Adduction: 4     Left Knee   Flexion: 4+  Extension: 4+     Right Knee   Flexion: 4+  Extension: 4+     Left Ankle/Foot   Dorsiflexion: 4+  Plantar flexion: 4+     Right Ankle/Foot   Dorsiflexion: 5  Plantar flexion: 5              Functional  "Gait Assessment     1. Gait level surface ___3__  Instructions: Walk at your normal speed from here to the next meron (20')        2. Change in gait speed __2___  Instructions: Begin walking at your normal pace (for 5'), when I tell you \"go,\" walk as fast as you can (for 5'). When I tell you \"slow,\" walk as slowly as  you can (for 5').        3. Gait with horizontal head turns __2___  Instructions: Begin walking at your normal pace. When I tell you to \"look right,\" keep walking straight, but turn your head to the right. Keep looking to  the right until I tell you, \"look left,\" then keep walking straight and turn your head to the left. Keep your head to the left until I tell you \"look straight,\"   then keep walking straight, but return your head to the center.        4. Gait with vertical head turns __2___  Instructions: Begin walking at your normal pace. When I tell you to \"look up,\" keep walking straight, but tip your head up. Keep looking up until I tell  you, \"look down,\" then keep walking straight and tip your head down. Keep your head down until I tell you \"look straight,\" then keep walking straight,  but return your head to the center.        5. Gait and pivot turn __2___  Instructions: Begin walking at your normal pace. When I tell you, \"turn and stop,\" turn as quickly as you can to face the opposite direction and stop.        6. Step over obstacle __3__  Instructions: Begin walking at your normal speed. When you come to the shoebox, step over it, not around it, and keep walking.        7. Gait with narrow base of support __2___  Instructions: Walk forward with your arms folded across your chest with your feet aligned heel to toe in tandem (12 ft or up to 10 steps in straight line).        8. Steps __2___  Instructions: Walk up these stairs as you would at home, i.e., using the railing if necessary. At the top, turn around and walk down.        9. Ambulating backwards __2___  Instructions: walk backwards until " I tell you to stop.        10. Gait with eyes closed __2___  Instructions: Walk forward at normal speed with your eyes closed (20 ft).           TOTAL SCORE: _22__ / 30              Assessment & Plan       Assessment  Impairments: abnormal coordination, abnormal gait, abnormal muscle firing, abnormal or restricted ROM, activity intolerance, impaired balance, impaired physical strength, lacks appropriate home exercise program and pain with function   Functional limitations: carrying objects, walking, uncomfortable because of pain, standing, stooping and unable to perform repetitive tasks   Assessment details: The patient presents to physical therapy for imbalance and difficulty walking secondary to a diagnosis of Parkinson's Disease. The patient presents with associated upper and lower extremity weakness, imbalance, and functional deficits (FGA). The patient has improved with strength measures since the evaluation, however, her functional gait assessment shows slight reduction in performance today. Overall, she is improving and expected to continue to progress with ongoing therapy. The patient would benefit from skilled PT intervention with the LSVT Big program to address the above mentioned functional limitations.   Prognosis: good    Goals  Plan Goals: 1. The patient has limited strength of the B shoulders and B hips.   LTG 1: 12 weeks:  The patient will demonstrate 4+ /5 strength for B shoulder flexion, abduction, external rotation, and internal rotation in order to demonstrate improved shoulder stability.    STATUS:  MET   LTG 1a: 12 weeks:  The patient will demonstrate 4+/5 strength for B hip flexion, abduction, and extension in order to improve hip stability.    STATUS:  not met              STG 1b: 6 weeks:  The patient will be independent with HEP.    TREATMENT: Manual therapy, therapeutic exercise, home exercise instruction, and modalities as needed to include: electrical stimulation, ultrasound, moist heat,  and ice.      2. The patient has gait dysfunction.   LTG 2: 12 weeks:  The patient will demonstrate > 26 / 30 on the Functional Gait Assessment for improved functional mobility.     STATUS:  not met   STG 2a: The patient will demonstrate > 24 / 30 on the Functional Gait Assessment for improved functional mobility.     STATUS:  not met   TREATMENT: Gait training, aquatic therapy, therapeutic exercise, and home exercise instruction.    Plan  Therapy options: will be seen for skilled therapy services  Planned modality interventions: dry needling, electrical stimulation/Russian stimulation, cryotherapy, TENS and thermotherapy (hydrocollator packs)  Planned therapy interventions: abdominal trunk stabilization, balance/weight-bearing training, fine motor coordination training, flexibility, functional ROM exercises, gait training, home exercise program, joint mobilization, manual therapy, neuromuscular re-education, postural training, soft tissue mobilization, spinal/joint mobilization, strengthening, stretching and therapeutic activities  Frequency: 3x week  Duration in weeks: 12  Treatment plan discussed with: patient    Progress toward previous goals: Partially Met    See Exercise, Manual, and Modality Logs for complete treatment.         Recommendations: Continue as planned  Timeframe: 2 months  Prognosis to achieve goals: good    PT Signature: Colby Berger PT    Electronically signed 2023    [place] License: [id number] - [id number]     Based upon review of the patient's progress and continued therapy plan, it is my medical opinion that Kristi Ibrahim should continue physical therapy treatment at [facility] PHYSICAL THERAPY  [address]  [place] [address] [phone].      Timed:         Manual Therapy:    0     mins  46590;     Therapeutic Exercise:    15     mins  05166;     Neuromuscular Candi:    15    mins  92137;    Therapeutic Activity:     10     mins  60725;     Gait Trainin      mins  49714;     Ultrasound:     0     mins  69328;    Self Care                       0     mins   46449  Aquatic                          0     mins 69041      Un-Timed:  Electrical Stimulation:    0     mins  64835 ( );  Dry Needling     0     mins self-pay  Canalith Repos    0     mins 35200  Traction     0     mins 12534  Low Eval     0     Mins  25713  Mod Eval     0     Mins  76023  High Eval                       0     Mins  12212  Re-Eval                           0    mins  65244      Timed Treatment:   40   mins   Total Treatment:     40   mins      I certify that the therapy services are furnished while this patient is under my care.  The services outlined above are required by this patient, and will be reviewed every 90 days.

## 2023-08-21 ENCOUNTER — TREATMENT (OUTPATIENT)
Dept: PHYSICAL THERAPY | Facility: CLINIC | Age: 73
End: 2023-08-21
Payer: MEDICARE

## 2023-08-21 DIAGNOSIS — G20 PARKINSONS: Primary | ICD-10-CM

## 2023-08-21 DIAGNOSIS — R26.89 IMBALANCE: ICD-10-CM

## 2023-08-21 DIAGNOSIS — R49.0 HYPOFUNCTIONAL DYSPHONIA: Primary | ICD-10-CM

## 2023-08-21 DIAGNOSIS — R26.9 GAIT DISTURBANCE: ICD-10-CM

## 2023-08-21 PROCEDURE — 97110 THERAPEUTIC EXERCISES: CPT | Performed by: PHYSICAL THERAPIST

## 2023-08-21 PROCEDURE — 97530 THERAPEUTIC ACTIVITIES: CPT | Performed by: PHYSICAL THERAPIST

## 2023-08-21 NOTE — PROGRESS NOTES
Outpatient Physical Therapy  [address] [facility], [place] [address]                            Physical Therapy Daily Treatment Note    Patient: Kristi Ibrahim   : 1950  Diagnosis/ICD-10 Code:  Parkinsons [G20]  Referring practitioner: Ruperto Conley MD  Date of Initial Visit: Type: THERAPY  Noted: 2023  Today's Date: 2023  Patient seen for 13 sessions           Subjective   Kristi Ibrahim reports: that she has been doing all her exercises at home and hasn't had any falls.     Objective   No LOB throughout PT session.    See Exercise, Manual, and Modality Logs for complete treatment.     Assessment/Plan  Kristi is progressing as evident by decreased overall falls and increased tolerance of LSVT BIG exercises at home.  Pt would benefit from skilled PT to address strength and endurance deficits within LSVT BIG protocol, transfer and gait training and any concerns with ADLs.       Progress per Plan of Care         Timed:  Manual Therapy:         mins  90869;  Therapeutic Exercise:    15     mins  02496;     Neuromuscular Candi:        mins  39486;    Therapeutic Activity:     23     mins  16003;     Gait Training:           mins  71800;    Aquatic Therapy:          mins  76494;       Timed Treatment:   38   mins   Total Treatment:     38   mins      Electronically signed:   Estella Berger PTA  Physical Therapist Assistant  Chela THAKUR License #: [id number]  LSVT BIG Certified: [id number]

## 2023-08-21 NOTE — PROGRESS NOTES
Outpatient Adult Speech Language Therapy           Treatment Note    Patient: Kristi Ibrahim                                                                                     Visit Date: 2023  :     1950    Referring practitioner:    Ruperto Conley MD  Date of Initial Visit:          Type: THERAPY  Noted: 2023    Patient seen for 11 sessions    Visit Diagnoses:    ICD-10-CM ICD-9-CM   1. Hypofunctional dysphonia  R49.0 784.49       SUBJECTIVE     Patient is here today to complete the LSVT LOUD protocol to increase intensity of speech and carryover the LOUD technique to into everyday conversation.      Patient  in last week of LSVT Loud program.  Patient indicates she is feeling more confident about voicing.   She is making good progress towards carryover.      Education: Progress, HEP    OBJECTIVE   GOALS    GOALS ACTIVITY PERFORMED TRIALS COMPLETED LEVEL OF CUEING REQUIRED   STG1d: 8 weeks:  Patient will complete vocal range exercises to increase vocal cord flexibility to 1 or more octaves with min assist.    Vocal range 10 repetitions of each exercise:  -high pitch and hold for five seconds decreased to 470.4 hertz at 76.6 dB       -low pitch and hold for five seconds decreased to  156.1 at 71.1 dB and was able to hold the low note for a steady 5 seconds on 50% of trials     Min  assist   STG 1e: 4 weeks: Patient will use the EMST 150 breather to increase coordination and breath support for Loud voicing with min assist.    EMST breather Patient completed 5 sets of 5 repetitions using the EMST 150 breather at 45 cm H2O (1 turn) and her MEP was 60 cmH2O (1 3/4 turns).  Min tassist to prep for EMST breather by forcefully blowing out without tension.    Goal met 23         STG 1f:  4 weeks:Patient will sustain /a/ for 20+ seconds at 75 or higher dB with microphone at 50 cm or more  with min assist while completing a  task to promote carryover into IADLs.  Sustained /a/ Patient completed 10 repetitions of /a/ at 50 cm while sorting cards with average phonation time increased to 24.2 seconds at 80.7 dB     Min assist to coordinate motor movement with voicing while using the LOUD voicing technique      Goal met 23   STG 1 weeks: Patient will read at the paragraph level for 5 or more minutes with a average peak intensity level of 75.0 dB or higher with moderately complex passages and microphone at 50 cm  or more.   Paragraph level Patient read paragraphs  for five minutes with sound pressure meter at 50+cm   Intensity of speech increased to an average of SPL of 75.1 dB     Min assist to continue to use LOUD voicing throughout the five minutes.    STG 1h: 4 weeks:  Patient will answer questions and participate in unstructured conversations while completing tasks with SPL Peak or average of 70.0 dB with microphone at 50 cm or further with min assist.   conversations Patient described fresh meals and preparation from [employer]  while drawing a pictures.  She averaged 69.0 dB for 10minutes   Moderate cueing to use her LOUD voice throughout the 5 minute activity.                  ASSESSMENT/PLAN     ASSESSMENT: Patient requires the skills of a therapist to provide min to mod assist to increase intensity and vocal range for safe voicing of wants and needs during IADLs.   Progress:  Patient beginning to meet remaining goals    PLAN: Continue plan of care      SIGNATURE: Marisa Inman, SLP, [place] License #: [id number]  Electronically Signed on 2023            Adult Outpatient Rehabiliation                                             [address] [place] [address]. [address]  [phone] Office  [phone] Fax

## 2023-08-22 ENCOUNTER — OFFICE VISIT (OUTPATIENT)
Dept: PULMONOLOGY | Facility: CLINIC | Age: 73
End: 2023-08-22
Payer: MEDICARE

## 2023-08-22 ENCOUNTER — TREATMENT (OUTPATIENT)
Dept: PHYSICAL THERAPY | Facility: CLINIC | Age: 73
End: 2023-08-22
Payer: MEDICARE

## 2023-08-22 VITALS
WEIGHT: 157 LBS | BODY MASS INDEX: 26.8 KG/M2 | TEMPERATURE: 97.8 F | HEIGHT: 64 IN | SYSTOLIC BLOOD PRESSURE: 125 MMHG | DIASTOLIC BLOOD PRESSURE: 82 MMHG | RESPIRATION RATE: 17 BRPM | HEART RATE: 75 BPM | OXYGEN SATURATION: 95 %

## 2023-08-22 DIAGNOSIS — R49.0 DYSPHONIA: ICD-10-CM

## 2023-08-22 DIAGNOSIS — J45.20 MILD INTERMITTENT ASTHMA, UNSPECIFIED WHETHER COMPLICATED: ICD-10-CM

## 2023-08-22 DIAGNOSIS — G20 PARKINSON'S DISEASE: ICD-10-CM

## 2023-08-22 DIAGNOSIS — J30.2 SEASONAL ALLERGIC RHINITIS, UNSPECIFIED TRIGGER: Primary | ICD-10-CM

## 2023-08-22 DIAGNOSIS — R49.0 HYPOFUNCTIONAL DYSPHONIA: Primary | ICD-10-CM

## 2023-08-22 DIAGNOSIS — R26.89 IMBALANCE: Primary | ICD-10-CM

## 2023-08-22 PROCEDURE — 97530 THERAPEUTIC ACTIVITIES: CPT | Performed by: PHYSICAL THERAPIST

## 2023-08-22 PROCEDURE — 97110 THERAPEUTIC EXERCISES: CPT | Performed by: PHYSICAL THERAPIST

## 2023-08-22 PROCEDURE — 97112 NEUROMUSCULAR REEDUCATION: CPT | Performed by: PHYSICAL THERAPIST

## 2023-08-22 NOTE — PROGRESS NOTES
Outpatient Physical Therapy  [address] [facility], [place] [address]                            Physical Therapy Daily Treatment Note    Patient: Kristi Ibrahim   : 1950  Diagnosis/ICD-10 Code:  Imbalance [R26.89]  Referring practitioner: Ruperto Conley MD  Date of Initial Visit: Type: THERAPY  Noted: 2023  Today's Date: 2023  Patient seen for 14 sessions           Subjective   Kristi Ibrahim reports: that folks have noticed that she is moving better and talking louder.     Objective   No Lob throughout PT session.    See Exercise, Manual, and Modality Logs for complete treatment.     Assessment/Plan  Kristi progressing as evident by decreased falls and increased tolerance of PT session. Pt required no assistance throughout PT session, no LOB throughout PT session. Pt would benefit from skilled PT to address strength and endurance deficits, transfer and gait training and any concerns with ADLs.     Progress per Plan of Care         Timed:  Manual Therapy:         mins  66471;  Therapeutic Exercise:    23     mins  64653;     Neuromuscular Candi:    15    mins  28748;    Therapeutic Activity:     15     mins  71792;     Gait Training:           mins  59790;    Aquatic Therapy:          mins  16680;       Timed Treatment:   53   mins   Total Treatment:     53   mins      Electronically signed:   Estella Berger PTA  Physical Therapist Assistant  [place]hela THAKUR License #: [id number]  LSVT BIG Certified: [id number]

## 2023-08-22 NOTE — PROGRESS NOTES
Outpatient Adult Speech Language Therapy           Treatment Note    Patient: Kristi Ibrahim                                                                                     Visit Date: 2023  :     1950    Referring practitioner:    Ruperto Conley MD  Date of Initial Visit:          Type: THERAPY  Noted: 2023    Patient seen for 12 sessions    Visit Diagnoses:    ICD-10-CM ICD-9-CM   1. Hypofunctional dysphonia  R49.0 784.49       SUBJECTIVE     Patient is here today to complete the LSVT LOUD protocol to increase intensity of speech and carryover the LOUD technique to into everyday conversation.      Patient  in last week of LSVT Loud program.  Patient indicates she is feeling more confident about voicing.   She is making good progress towards carryover.      Education: Progress, HEP    OBJECTIVE   GOALS    GOALS ACTIVITY PERFORMED TRIALS COMPLETED LEVEL OF CUEING REQUIRED   STG1d: 8 weeks:  Patient will complete vocal range exercises to increase vocal cord flexibility to 1 or more octaves with min assist.    Vocal range 10 repetitions of each exercise:    Vocal scale times 2  Patient can complete scale up to 1 1/2 octaves with good consistency.    -high pitch and hold for five seconds increased to an average of 504.4 Hz from 470.4 hertz at 75.7 dB.      Highest pitch is 558 Hertz      -low pitch and hold for five seconds average is 170.6 Hertz with a sound pressure level of 75.4.    Lowest pitch is 167.0 Hertz     Pitch range is now 391 Hertz when completing exercises and 1.5 pitch range (12 steps) up from initial evaluation pitch range of 220 Hertz and a 4 note pitch range. )  Patient is increasing vocal cord flexibility.   Min  assist        Goal met 23   STG 1e: 4 weeks: Patient will use the EMST 150 breather to increase coordination and breath support for Loud voicing with min assist.    EMST breather Patient  completed 5 sets of 5 repetitions using the EMST 150 breather at 45 cm H2O (1 turn) and her MEP was 60 cmH2O (1 3/4 turns).  Min tassist to prep for EMST breather by forcefully blowing out without tension.    Goal met 23         STG 1f:  4 weeks:Patient will sustain /a/ for 20+ seconds at 75 or higher dB with microphone at 50 cm or more  with min assist while completing a task to promote carryover into IADLs.  Sustained /a/ Patient completed 10 repetitions of /a/ at 50 cm while sorting cards with average phonation time increased to 24.2 seconds at 80.7 dB    (Initial evaluation patient maximum phonation time  was 9.68 seconds  with patient averaging 24.2 second which is a 14.52 second increase in phonation time) indicating increased breath support and increased vocal cord closure allowing for increased intensity of speech.      Min assist to coordinate motor movement with voicing while using the LOUD voicing technique      Goal met 23   STG 1 weeks: Patient will read at the paragraph level for 5 or more minutes with a average peak intensity level of 75.0 dB or higher with moderately complex passages and microphone at 50 cm  or more.   Paragraph level Patient read paragraphs  for five minutes with sound pressure meter at 50+cm   Intensity of speech increased to an average of SPL peaks of 76.9 dB    General paragraphs     Min assist to continue to use LOUD voicing throughout the five minutes.    STG 1h: 4 weeks:  Patient will answer questions and participate in unstructured conversations while completing tasks with SPL Peak or average of 70.0 dB with microphone at 50 cm or further with min assist.   conversations Patient and therapist completed random, unstructured conversation while completing cancellation tasks for 10 minutes.    She  averaged 67.54 dB when completing task with increased cognitive load.     She averaged 72.2 dB in conversation with no distractions   Moderate cueing to use her LOUD  voicing techniques in carryover to unstructured spontaneous speech with cognitive load.                  ASSESSMENT/PLAN     ASSESSMENT: Patient requires the skills of a therapist to provide min to mod assist to increase intensity and vocal range for safe voicing of wants and needs during IADLs.   Progress:  Patient beginning to meet remaining goals    PLAN: Continue plan of care      SIGNATURE: Marisa Inman, SLP, [place] License #: [id number]  Electronically Signed on 8/22/2023            Adult Outpatient Rehabiliation                                             [address] [place] [address]. [address]  [phone] Office  [phone] Fax

## 2023-08-23 ENCOUNTER — TREATMENT (OUTPATIENT)
Dept: PHYSICAL THERAPY | Facility: CLINIC | Age: 73
End: 2023-08-23
Payer: MEDICARE

## 2023-08-23 DIAGNOSIS — G20 PARKINSON'S DISEASE: ICD-10-CM

## 2023-08-23 DIAGNOSIS — R49.0 DYSPHONIA: Primary | ICD-10-CM

## 2023-08-23 DIAGNOSIS — R26.9 GAIT DISTURBANCE: ICD-10-CM

## 2023-08-23 DIAGNOSIS — R26.89 IMBALANCE: Primary | ICD-10-CM

## 2023-08-23 PROCEDURE — 97110 THERAPEUTIC EXERCISES: CPT | Performed by: PHYSICAL THERAPIST

## 2023-08-23 PROCEDURE — 97530 THERAPEUTIC ACTIVITIES: CPT | Performed by: PHYSICAL THERAPIST

## 2023-08-23 PROCEDURE — 97112 NEUROMUSCULAR REEDUCATION: CPT | Performed by: PHYSICAL THERAPIST

## 2023-08-23 NOTE — PROGRESS NOTES
Outpatient Physical Therapy  [address] [facility], [place] [address]                            Physical Therapy Daily Treatment Note    Patient: Kristi Ibrahim   : 1950  Diagnosis/ICD-10 Code:  Imbalance [R26.89]  Referring practitioner: Ruperto Conley MD  Date of Initial Visit: Type: THERAPY  Noted: 2023  Today's Date: 2023  Patient seen for 15 sessions           Subjective   Kristi Ibrahim reports: that she is doing her exercises at home, she plans to get out her bands so she can continue strengthening.     Objective   No LOB throughout PT session.   Good form squatting to  bags    See Exercise, Manual, and Modality Logs for complete treatment.     Assessment/Plan  Kristi progressing as evident by decreased falls and increased tolerance of PT session. Pt required no assistance throughout PT session. Pt would benefit from skilled PT to address strength and endurance deficits, transfer and gait training and any concerns with ADLs.     Progress per Plan of Care         Timed:  Manual Therapy:         mins  39127;  Therapeutic Exercise:    15     mins  07639;     Neuromuscular Candi:    15    mins  02461;    Therapeutic Activity:     15     mins  19012;     Gait Training:           mins  87074;    Aquatic Therapy:          mins  12118;       Timed Treatment:   45   mins   Total Treatment:     45   mins      Electronically signed:   Estella Berger PTA  Physical Therapist Assistant  [facility] License #: [id number]  LSVT BIG Certified: [id number]

## 2023-08-23 NOTE — PROGRESS NOTES
"Chief Complaint  Asthma and Follow-up (1 Year )    Subjective          Kristi Ibrahim presents to [facility] PULMONARY & CRITICAL CARE MEDICINE  History of Present Illness  73-year-old female with seasonal asthma here for follow-up  Feels much better  Uses rescue inhaler very infrequently  Objective   Vital Signs:   /82 (BP Location: Left arm, Patient Position: Sitting, Cuff Size: Adult)   Pulse 75   Temp 97.8 øF (36.6 øC) (Temporal)   Resp 17   Ht 161.3 cm (63.5\")   Wt 71.2 kg (157 lb)   SpO2 95% Comment: room air  BMI 27.38 kg/mý            Physical Exam   General Pleasant female  Neuro alert and oriented person place time and date cranial nerves II to XII grossly intact  Lungs clear to auscultation      Result Review :            Assessment and Plan    Diagnoses and all orders for this visit:    1. Seasonal allergic rhinitis, unspecified trigger (Primary)    2. Mild intermittent asthma, unspecified whether complicated      1. Mild intermittent asthma, unspecified whether complicated (Primary)   Patient's symptoms are very well controlled at this time  We will continue albuterol  Continue Flovent  Continue Zyrtec  Patient up-to-date on pulmonary specific vaccines  Commended RSV vaccine in the fall    Seasonal allergies/allergic rhinitis  Continue Zyrtec    Return to care as needed    Follow Up   Return if symptoms worsen or fail to improve.  Patient was given instructions and counseling regarding her condition or for health maintenance advice. Please see specific information pulled into the AVS if appropriate.       "

## 2023-08-23 NOTE — PROGRESS NOTES
Outpatient Adult Speech Language Therapy           Treatment Note    Patient: Kristi Ibrahim                                                                                     Visit Date: 2023  :     1950    Referring practitioner:    Ruperto Conley MD  Date of Initial Visit:          Type: THERAPY  Noted: 2023    Patient seen for 13 sessions    Visit Diagnoses:    ICD-10-CM ICD-9-CM   1. Dysphonia  R49.0 784.42       SUBJECTIVE     Patient is here today to complete the LSVT LOUD protocol to increase intensity of speech and carryover the LOUD technique to into everyday conversation.        Education: Patient educated on progress since initial evaluation.  Additionally, patient educated on the need to use the LOUD techniques in carryover in conversations.  POC this last two sessions is to carryover the techniques into conversation.     OBJECTIVE   GOALS    GOALS ACTIVITY PERFORMED TRIALS COMPLETED LEVEL OF CUEING REQUIRED   STG1d: 8 weeks:  Patient will complete vocal range exercises to increase vocal cord flexibility to 1 or more octaves with min assist.    Vocal range  Min  assist        Goal met 23   STG 1e: 4 weeks: Patient will use the EMST 150 breather to increase coordination and breath support for Loud voicing with min assist.    EMST breather  Min tassist to prep for EMST breather by forcefully blowing out without tension.    Goal met 23         STG 1f:  4 weeks:Patient will sustain /a/ for 20+ seconds at 75 or higher dB with microphone at 50 cm or more  with min assist while completing a task to promote carryover into IADLs.  Sustained /a/ /     Min assist to coordinate motor movement with voicing while using the LOUD voicing technique      Goal met 23   STG 1 weeks: Patient will read at the paragraph level for 5 or more minutes with a average peak intensity level of 75.0 dB or higher with  moderately complex passages and microphone at 50 cm  or more.   Paragraph level Patient read paragraphs  for five minutes with sound pressure meter at 50+cm   Intensity of speech increased to an average of SPL peaks of 75.1dB    General paragraphs on life using the LSVT      No assist    Goal met 8/21/23   STG 1h: 4 weeks:  Patient will answer questions and participate in unstructured conversations while completing tasks with SPL Peak or average of 70.0 dB with microphone at 50 cm or further with min assist.   conversations Patient and therapist completed random, unstructured conversation with an emotional component.  Patient required to use the LOUD techniques in every situation.  Patient averaged 72.8 dB.   Reduced cueing to min to moderate cueing to use her LOUD voicing techniques in carryover to unstructured spontaneous speech with cognitive load.                  ASSESSMENT/PLAN     ASSESSMENT: Patient requires the skills of a therapist to provide min to mod assist to increase intensity of speech during conversation to safely communicate  wants and needs during IADLs.   Progress:  Patient beginning to meet remaining goals    PLAN: Continue plan of care      SIGNATURE: Marisa Inman, SLP, [place] License #: [id number]  Electronically Signed on 8/23/2023            [facility]ult Outpatient Re[facility]iation                                             [address] [place] [address]. [address]  [phone] Office  [phone] Fax

## 2023-08-25 ENCOUNTER — TREATMENT (OUTPATIENT)
Dept: PHYSICAL THERAPY | Facility: CLINIC | Age: 73
End: 2023-08-25
Payer: MEDICARE

## 2023-08-25 DIAGNOSIS — R26.9 GAIT DISTURBANCE: ICD-10-CM

## 2023-08-25 DIAGNOSIS — R26.89 IMBALANCE: Primary | ICD-10-CM

## 2023-08-25 DIAGNOSIS — G20 PARKINSON'S DISEASE: ICD-10-CM

## 2023-08-25 DIAGNOSIS — R49.0 HYPOFUNCTIONAL DYSPHONIA: Primary | ICD-10-CM

## 2023-08-25 PROCEDURE — 97110 THERAPEUTIC EXERCISES: CPT | Performed by: PHYSICAL THERAPIST

## 2023-08-25 PROCEDURE — 97112 NEUROMUSCULAR REEDUCATION: CPT | Performed by: PHYSICAL THERAPIST

## 2023-08-25 PROCEDURE — 97530 THERAPEUTIC ACTIVITIES: CPT | Performed by: PHYSICAL THERAPIST

## 2023-08-25 NOTE — PROGRESS NOTES
Outpatient Adult Speech Language Therapy           Treatment Note    Patient: Kristi Ibrahim                                                                                     Visit Date: 2023  :     1950    Referring practitioner:    Ruperto Conley MD  Date of Initial Visit:          23    Patient seen for 14 sessions    Visit Diagnoses:    ICD-10-CM ICD-9-CM   1. Hypofunctional dysphonia  R49.0 784.49         SUBJECTIVE     Patient is here today to complete the LSVT LOUD protocol to increase intensity of speech and carryover the LOUD technique to into everyday conversation.      Ms. Ibrahim has responded well to the LSVT program.  She is faithfully completing exercises at home and she indicates her children have noticed her increased intensity of speech during telephone calls.        Education: Discharge HEP for LSVT and EMST.  Carryover activities after discharge.   OBJECTIVE   GOALS    GOALS ACTIVITY PERFORMED TRIALS COMPLETED LEVEL OF CUEING REQUIRED   STG1d: 8 weeks:  Patient will complete vocal range exercises to increase vocal cord flexibility to 1 or more octaves with min assist.    Vocal range  Min  assist        Goal met 23   STG 1e: 4 weeks: Patient will use the EMST 150 breather to increase coordination and breath support for Loud voicing with min assist.    EMST breather  Min tassist to prep for EMST breather by forcefully blowing out without tension.    Goal met 23         STG 1f:  4 weeks:Patient will sustain /a/ for 20+ seconds at 75 or higher dB with microphone at 50 cm or more  with min assist while completing a task to promote carryover into IADLs.  Sustained /a/ /     Min assist to coordinate motor movement with voicing while using the LOUD voicing technique      Goal met 23   STG 1 weeks: Patient will read at the paragraph level for 5 or more minutes with a average peak intensity level  of 75.0 dB or higher with moderately complex passages and microphone at 50 cm  or more.   Paragraph level    No assist    Goal met 23   STG 1h: 4 weeks:  Patient will answer questions and participate in unstructured conversations while completing tasks with SPL Peak or average of 70.0 dB with microphone at 50 cm or further with min assist.   conversations Patient and therapist completed random, unstructured conversation with challenge of a cognitive task.     This cognitive task for her was a higher demand on her fine hand motor skills.  She was liking paper clips together and conversing with the therapist.      Intensity of speech increased to 70.2 dB Reduced cueing to min assist.      Goal met             ASSESSMENT/PLAN     ASSESSMENT: Patient requires the skills of a therapist to provide reduced cueing to min  to increase intensity of speech during conversation to safely communicate  wants and needs during IADLs.   Progress:  Patient met all goals today.  Patient indicates she is louder and her children hear and understand her better on the phone,  she has less word finding difficulties, she now sings in [employer], and she stops and talks with her friends after [employer] more often.  Patient indicates she feels more confident using her voice and communicating in public and loud places.      PLAN: discharge      SIGNATURE: Marisa Inman, SLP, [place] License #: [id number]  Electronically Signed on 2023            [facility]                                             [address] [place] [address]. [address]  [phone] Office  [phone] Fax                   Speech Discharge Statement        Outpatient Speech Language Pathology   Adult Voice  Discharge     Patient Name: Kristi Ibrahim  : 1950  MRN: [id number]  Today's Date: 2023       Visit Date: 2023        Visit Dx:    ICD-10-CM ICD-9-CM   1. Hypofunctional dysphonia  R49.0 784.49       Patient's History: Voice therapy  using the LOUD protocol to increase intensity of speech    Reason for Discharge:Patient has met all goals and feels more confident with her voice.  She is communicating effectively at home, on the phone and in [employer].  Ms. Ibrahim is now singing in [employer].  All personal goals patient wanted to achieve have been met.        Discharge Instructions:   Continue the LSVT homework protocol and EMST training.  A handout of discharge instructions were given to patient to continue LSVT HEP and EMST HEP.        Functional Communication Measures:  Patient is rated at discharge a level 6/7 on Functional Communication Measures for Voice with a 1-19% limitation.        Assessment:  Voice Handicap Interpretation     Voice Handicapped Index (VHI)  (Brittany, Jenni Reddy, Samuel, Brittany, Sandy, & Angel (1997). The Voice Handicap Index (VHI). American Journal of Speech Language Pathology, 6, 66-70.     * see Scanned VHI reports     Initial Voice Handicap Index: 7-31-23   Physical= 10 Functional= 16 Emotional = 2   Total Score= 28  Patient rates herself with mild dysphonia.      Discharge Voice Handicap Index: 8/25/23   Physical= 10 Functional= 16 Emotional = 2   Total Score= 28  Patient rates herself with mild dysphonia.      A shift of 18 points or more during treatment reflects a shift that is not just a result of VHI variability.        Maximum phonation time increased :   Initial evaluation patient maximum phonation time  was 9.68 seconds  with patient averaging today at discharge, 25.0 seconds at 77.6 dB  sound pressure level; and maximum phonation time of 28.0 seconds indicating increased breath support and increased vocal cord closure allowing for increased intensity of speech.     Pitch range increased:  Pitch range is now 368 Hertz at discharge today and 1.5 pitch range (12 steps) up from initial evaluation pitch range of 220 Hertz and a 4 note pitch range. ) Patient is increasing vocal cord flexibility.      Patient increased intensity of conversation from initial evaluation at 64 dB sound pressure level to 70.2 dB with good carryover into conversation at discharge.      Recommend discharge.          Speech Therapy Goals:  Goals  PROBLEMS:  1. Voice Functional Limitation                           LTG 1:8 weeks:  The patient will demonstrate 1-19% limitation by achieving a level of 6 on the Functional Communication Measures for voice by completing the [facility] Voice Treatment program to increase vocal intensity for patient to safely communicate wants and needs and participate in vocational , avocation and social activities in low and high vocal demands with self monitoring and minimal situational variances.                            STATUS:  Met              STG 1a: 8 weeks:  Patient will increase vocal intensity to reach a target sound pressure level of 72dB SPL for 15 seconds or more during sustained phonation using proper breath support with min assist to promote safe communication of wants and needs during ADLS.              STATUS: Met              STG 1b: 8 weeks:  Patient will increase vocal intensity to reach a target sound pressure level of 71dB SPL or more with min cues to increase loudness during reading at the word and sentence to increase vocal respiratory support to  increase vocal intensity for safe communication of wants and needs during ADLS.               STATUS: Met              STG 1c: 8 weeks: Patient will reach a target sound pressure level of 68dB or more with min cueing to communicate effectively during simple structured conversational speech, which will help to increase vocal respiratory support for safe functional communication of wants and needs during ADLs.                STATUS: Met              STG1d: 8 weeks:  Patient will complete vocal range exercises to increase vocal cord flexibility to 1 or more octaves with min assist.               STATUS:Met              STG 1e: 4 weeks:  Patient will use the EMST 150 breather to increase coordination and breath support for Loud voicing with min assist.               STATUS: Met              STG 1f:  4 weeks:Patient will sustain /a/ for 20+ seconds at 75 or higher dB with microphone at 50 cm or more  with min assist while completing a task to promote carryover into IADLs.               STATUS: Met              STG 1 weeks: Patient will read at the paragraph level for 5 or more minutes with a average peak intensity level of 75.0 dB or higher with moderately complex passages and microphone at 50 cm  or more.                STATUS: Met              STG 1h: 4 weeks:  Patient will answer questions and participate in unstructured conversations while completing tasks with SPL Peak or average of 70.0 dB with microphone at 50 cm or further with min assist.              STATUS: Met                Treatment: Speech Therapy using the [facility] Voice Treatment protocol administered by a certified clinician to increase vocal intensity for safe communication of wants and needs during ADLs.       Recommendations: Discharge             ST Plan,DC  PLAN    ST SIGNATURE: Marisa Inman SLP    Electronically signed 2023    [place]  License:  [id number]

## 2023-08-25 NOTE — PROGRESS NOTES
Outpatient Physical Therapy  [address] [facility], [place] [address]                            Physical Therapy Daily Treatment Note    Patient: Kristi Ibrahim   : 1950  Diagnosis/ICD-10 Code:  Imbalance [R26.89]  Referring practitioner: Ruperto Conley MD  Date of Initial Visit: Type: THERAPY  Noted: 2023  Today's Date: 2023  Patient seen for 16 sessions           Subjective   Kristi Ibrahim reports: doing her exercises at home, states that she hasn't had any falls.    Objective   No LOB throughout PT session.    See Exercise, Manual, and Modality Logs for complete treatment.     Assessment/Plan  Kristi progressing as evident by decreased falls and increased tolerance of PT session. Pt required no assistance throughout PT session. Pt would benefit from skilled PT to address strength and endurance deficits, transfer and gait training and any concerns with ADLs.       Progress per Plan of Care         Timed:  Manual Therapy:         mins  08390;  Therapeutic Exercise:    15     mins  97197;     Neuromuscular Candi:    15    mins  64841;    Therapeutic Activity:     15     mins  14060;     Gait Training:           mins  18123;    Aquatic Therapy:          mins  52307;       Untimed:  Electrical Stimulation:         mins  52220 ( );  Mechanical Traction:         mins  86583;       Timed Treatment:   45   mins   Total Treatment:     45   mins      Electronically signed:     Estella Berger PTA  Physical Therapist Assistant  [facility] License #: [id number]

## 2023-08-29 ENCOUNTER — HOSPITAL ENCOUNTER (OUTPATIENT)
Dept: OTHER | Facility: HOSPITAL | Age: 73
Discharge: HOME | End: 2023-08-29

## 2023-09-13 ENCOUNTER — HOSPITAL ENCOUNTER (OUTPATIENT)
Dept: MAMMOGRAPHY | Facility: HOSPITAL | Age: 73
Discharge: HOME | End: 2023-09-13
Admitting: NURSE PRACTITIONER
Payer: MEDICARE

## 2023-09-13 DIAGNOSIS — Z12.31 VISIT FOR SCREENING MAMMOGRAM: ICD-10-CM

## 2023-09-13 PROCEDURE — 77067 SCR MAMMO BI INCL CAD: CPT

## 2023-09-13 PROCEDURE — 77063 BREAST TOMOSYNTHESIS BI: CPT

## 2023-09-15 DIAGNOSIS — R92.8 ABNORMAL MAMMOGRAM OF LEFT BREAST: Primary | ICD-10-CM

## 2023-10-09 ENCOUNTER — HOSPITAL ENCOUNTER (OUTPATIENT)
Dept: ULTRASOUND IMAGING | Facility: HOSPITAL | Age: 73
Discharge: HOME | End: 2023-10-09
Payer: MEDICARE

## 2023-10-09 ENCOUNTER — HOSPITAL ENCOUNTER (OUTPATIENT)
Dept: MAMMOGRAPHY | Facility: HOSPITAL | Age: 73
Discharge: HOME | End: 2023-10-09
Payer: MEDICARE

## 2023-10-09 DIAGNOSIS — R92.8 ABNORMAL MAMMOGRAM OF LEFT BREAST: ICD-10-CM

## 2023-10-09 PROCEDURE — 77065 DX MAMMO INCL CAD UNI: CPT

## 2023-10-09 PROCEDURE — G0279 TOMOSYNTHESIS, MAMMO: HCPCS

## 2023-10-12 ENCOUNTER — HOSPITAL ENCOUNTER (OUTPATIENT)
Dept: MAMMOGRAPHY | Facility: HOSPITAL | Age: 73
Discharge: HOME | End: 2023-10-12
Payer: MEDICARE

## 2023-10-12 ENCOUNTER — PATIENT OUTREACH (OUTPATIENT)
Dept: ONCOLOGY | Facility: HOSPITAL | Age: 73
End: 2023-10-12
Payer: MEDICARE

## 2023-10-12 DIAGNOSIS — R92.8 ABNORMAL MAMMOGRAM OF LEFT BREAST: ICD-10-CM

## 2023-10-12 DIAGNOSIS — R92.1 BREAST CALCIFICATIONS: ICD-10-CM

## 2023-10-12 PROCEDURE — 25010000002 LIDOCAINE 1 % SOLUTION: Performed by: FAMILY MEDICINE

## 2023-10-12 PROCEDURE — A4648 IMPLANTABLE TISSUE MARKER: HCPCS

## 2023-10-12 PROCEDURE — 88305 TISSUE EXAM BY PATHOLOGIST: CPT | Performed by: FAMILY MEDICINE

## 2023-10-12 PROCEDURE — 76098 X-RAY EXAM SURGICAL SPECIMEN: CPT

## 2023-10-12 PROCEDURE — 88342 IMHCHEM/IMCYTCHM 1ST ANTB: CPT | Performed by: FAMILY MEDICINE

## 2023-10-12 RX ORDER — LIDOCAINE HYDROCHLORIDE 10 MG/ML
10 INJECTION, SOLUTION INFILTRATION; PERINEURAL ONCE
Status: COMPLETED | OUTPATIENT
Start: 2023-10-12 | End: 2023-10-12

## 2023-10-12 RX ORDER — LIDOCAINE HYDROCHLORIDE AND EPINEPHRINE 10; 10 MG/ML; UG/ML
20 INJECTION, SOLUTION INFILTRATION; PERINEURAL ONCE
Status: COMPLETED | OUTPATIENT
Start: 2023-10-12 | End: 2023-10-12

## 2023-10-12 RX ADMIN — LIDOCAINE HYDROCHLORIDE 10 ML: 10 INJECTION, SOLUTION INFILTRATION; PERINEURAL at 08:49

## 2023-10-12 RX ADMIN — LIDOCAINE HYDROCHLORIDE AND EPINEPHRINE 20 ML: 10; 10 INJECTION, SOLUTION INFILTRATION; PERINEURAL at 08:49

## 2023-10-13 LAB
CYTO UR: NORMAL
LAB AP CASE REPORT: NORMAL
LAB AP CLINICAL INFORMATION: NORMAL
LAB AP SPECIAL STAINS: NORMAL
PATH REPORT.FINAL DX SPEC: NORMAL
PATH REPORT.GROSS SPEC: NORMAL

## 2023-10-16 ENCOUNTER — PATIENT OUTREACH (OUTPATIENT)
Dept: ONCOLOGY | Facility: HOSPITAL | Age: 73
End: 2023-10-16
Payer: MEDICARE

## 2023-10-19 ENCOUNTER — PATIENT OUTREACH (OUTPATIENT)
Dept: ONCOLOGY | Facility: HOSPITAL | Age: 73
End: 2023-10-19
Payer: MEDICARE

## 2023-11-10 RX ORDER — NEBIVOLOL 2.5 MG/1
TABLET ORAL
Qty: 90 TABLET | Refills: 1 | Status: SHIPPED | OUTPATIENT
Start: 2023-11-10

## 2023-12-11 ENCOUNTER — OFFICE VISIT (OUTPATIENT)
Dept: CARDIOLOGY | Facility: CLINIC | Age: 73
End: 2023-12-11
Payer: MEDICARE

## 2023-12-11 VITALS
HEART RATE: 76 BPM | BODY MASS INDEX: 28.68 KG/M2 | SYSTOLIC BLOOD PRESSURE: 148 MMHG | HEIGHT: 64 IN | DIASTOLIC BLOOD PRESSURE: 88 MMHG | WEIGHT: 168 LBS

## 2023-12-11 DIAGNOSIS — I25.10 CAD S/P PERCUTANEOUS CORONARY ANGIOPLASTY: Primary | ICD-10-CM

## 2023-12-11 DIAGNOSIS — I10 PRIMARY HYPERTENSION: ICD-10-CM

## 2023-12-11 DIAGNOSIS — E78.49 OTHER HYPERLIPIDEMIA: ICD-10-CM

## 2023-12-11 DIAGNOSIS — Z98.61 CAD S/P PERCUTANEOUS CORONARY ANGIOPLASTY: Primary | ICD-10-CM

## 2023-12-11 PROBLEM — R60.0 LOCALIZED EDEMA: Status: RESOLVED | Noted: 2022-05-19 | Resolved: 2023-12-11

## 2023-12-11 PROBLEM — Z79.891 LONG-TERM CURRENT USE OF OPIATE ANALGESIC: Status: RESOLVED | Noted: 2019-07-08 | Resolved: 2023-12-11

## 2023-12-11 PROBLEM — R42 DIZZINESS: Status: RESOLVED | Noted: 2020-01-13 | Resolved: 2023-12-11

## 2023-12-11 PROBLEM — R25.2 MUSCLE CRAMPS: Status: RESOLVED | Noted: 2022-02-14 | Resolved: 2023-12-11

## 2023-12-11 PROBLEM — R00.2 PALPITATIONS: Status: RESOLVED | Noted: 2021-06-22 | Resolved: 2023-12-11

## 2023-12-11 PROBLEM — I49.5 SSS (SICK SINUS SYNDROME): Status: RESOLVED | Noted: 2021-05-04 | Resolved: 2023-12-11

## 2023-12-11 PROBLEM — M79.606 LEG PAIN: Status: RESOLVED | Noted: 2022-02-14 | Resolved: 2023-12-11

## 2023-12-11 PROBLEM — R06.02 SHORTNESS OF BREATH: Status: RESOLVED | Noted: 2022-02-14 | Resolved: 2023-12-11

## 2023-12-11 PROBLEM — R29.898 LEG WEAKNESS, BILATERAL: Status: RESOLVED | Noted: 2019-09-10 | Resolved: 2023-12-11

## 2023-12-11 PROBLEM — M79.89 LEG SWELLING: Status: RESOLVED | Noted: 2022-02-14 | Resolved: 2023-12-11

## 2023-12-11 PROBLEM — J32.9 SINUS INFECTION: Status: RESOLVED | Noted: 2022-03-31 | Resolved: 2023-12-11

## 2023-12-11 NOTE — PROGRESS NOTES
"Chief Complaint  Follow-up and Coronary Artery Disease    Subjective            History of Present Illness  Kristi Ibrahim is a 73-year-old female patient who presents to the office today for follow up. She has CAD with prior stenting, hypertension, and hyperlipidemia. She is compliant with medications. She denies chest pain, shortness of breath, lightheadedness/dizziness, palpitations, or edema.     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    Hepatitis A     HL (hearing loss)     Injury of back     Injury of neck     Leg pain     Leg swelling     Leg weakness, bilateral 09/10/2019    Long-term current use of opiate analgesic 07/08/2019    Low back pain 08/25/2020    Lumbar herniated disc     Lumbar stenosis 09/10/2019    Mitral valve insufficiency     Muscle cramps     Osteopenia     Other hyperlipidemia 08/01/2022    Parkinson's disease     Scoliosis     Seasonal allergic rhinitis     Shortness of breath     Sinus infection 03/31/2022    Spondylolisthesis of cervical region 09/10/2019    Spondylolisthesis of lumbar region 08/25/2020    Tricuspid regurgitation     Urinary tract infection     Voice hoarseness          ALLERGY  Allergies   Allergen Reactions    Nickel Rash    Rapeseed [Brassica Napus Seed Oil] Hives     Had to go to ER for treatment.    Ace Inhibitors Unknown (See Comments)     NOT  SURE    Acetaminophen Swelling    Brompheniramine Unknown (See Comments)     NOT SURE    Codeine Nausea And Vomiting    Diclofenac Urinary Retention     GI PAIN - " ULCERS???    Flurbiprofen Unknown (See Comments)     NOT SURE    Hydrocodone Nausea Only and Nausea And Vomiting    Ibuprofen Unknown (See Comments)     NOT SURE    Indocyanine Green Unknown (See Comments)     NOT SURE    Pantoprazole Nausea And Vomiting    Propoxyphene Unknown (See Comments)     NOT SURE    Sulfamethoxazole Hives    Tramadol Other (See Comments)    Trimethoprim Hives    Vegetable Oil Hives    Iodinated Contrast Media Rash          SURGICALHX  Past Surgical History:   Procedure Laterality Date    ADENOIDECTOMY      BREAST SURGERY  Multiple cysts removed    CARDIAC CATHETERIZATION      CARDIAC CATHETERIZATION N/A 04/23/2018    Procedure: Left Heart Cath;  Surgeon: Malina Breen MD;  Location:  [facility];  Service: Cardiovascular    CARDIAC CATHETERIZATION N/A 04/23/2018    Procedure: Coronary angiography;  Surgeon: Malina Breen MD;  Location:  [facility];  Service: Cardiovascular    CARDIAC CATHETERIZATION N/A 04/23/2018    Procedure: Left ventriculography;  Surgeon: Malina Breen MD;  Location:  [facility];  Service: Cardiovascular    CARPAL TUNNEL RELEASE Bilateral     CHOLECYSTECTOMY      COLONOSCOPY      CORONARY ANGIOPLASTY      CORONARY STENT PLACEMENT      EYE SURGERY  Cateracts    FOOT SURGERY      HAND SURGERY      KNEE ARTHROSCOPY Left     OTHER SURGICAL HISTORY      JOINT SURGERY    RIB BIOPSY, RIB RESECTION      SPINAL FUSION      TONSILLECTOMY AND ADENOIDECTOMY      TUBAL ABDOMINAL LIGATION            SOC  Social History     Socioeconomic History    Marital status:    Tobacco Use    Smoking status: Never    Smokeless tobacco: Never   Vaping Use    Vaping Use: Never used   Substance and Sexual Activity    Alcohol use: Never    Drug use: Never    Sexual activity: Not Currently     Partners: Male     Birth control/protection: Post-menopausal         FAMHX  Family History   Problem Relation Age of Onset     Heart disease Mother     Stroke Mother     Arthritis Mother     Osteoarthritis Mother     Heart disease Father     Hypertension Father     Arthritis Father     Stomach cancer Father     Rheum arthritis Father     Heart disease Brother     Hypertension Brother     Arthritis Brother     Breast cancer Sister     Arthritis Sister     Cancer Sister         Breast    Cancer Maternal Grandmother         Lung    Cancer Sister         Breast    Diabetes Brother     Hypertension Brother           MEDSIGONLY  Current Outpatient Medications on File Prior to Visit   Medication Sig    albuterol sulfate  (90 Base) MCG/ACT inhaler Inhale 2 puffs Every 4 (Four) Hours As Needed for Wheezing.    amantadine (SYMMETREL) 100 MG capsule     aspirin 81 MG tablet Take 1 tablet by mouth Every Other Day.    baclofen (LIORESAL) 10 MG tablet Take 1 tablet by mouth 3 (Three) Times a Day.    carbidopa-levodopa (SINEMET)  MG per tablet Take 1.5 tablets by mouth 3 (Three) Times a Day.    cetirizine (zyrTEC) 10 MG tablet Take 1 tablet by mouth Daily.    ferrous sulfate 325 (65 FE) MG EC tablet Take 1 tablet by mouth Daily With Breakfast.    fluticasone (FLOVENT HFA) 110 MCG/ACT inhaler Inhale 2 puffs 2 (Two) Times a Day.    hydroCHLOROthiazide (MICROZIDE) 12.5 MG capsule Take 1 capsule by mouth Daily. (Patient taking differently: Take 1 capsule by mouth Daily. Pt only takes twice a week)    lidocaine (LIDODERM) 5 %     magnesium oxide (MAG-OX) 400 MG tablet Take 1 tablet by mouth 2 (Two) Times a Day.    meclizine (ANTIVERT) 25 MG tablet Take 1 tablet by mouth Daily.    Multiple Vitamins-Minerals (HAIR SKIN AND NAILS FORMULA PO) Take 1 tablet by mouth Daily.    nebivolol (BYSTOLIC) 2.5 MG tablet TAKE 1 TABLET DAILY    nitroglycerin (NITROSTAT) 0.4 MG SL tablet Place 1 tablet under the tongue Every 5 (Five) Minutes As Needed for Chest Pain.    ondansetron (ZOFRAN) 4 MG tablet ondansetron HCl 4 mg tablet    Probiotic Product (SOLUBLE  "FIBER/PROBIOTICS PO) Take 1 tablet by mouth Daily.    rOPINIRole (Requip) 1 MG tablet Take 1-2 tablets 1 hour before bedtime for restless legs.    SUMAtriptan (IMITREX) 50 MG tablet TAKE 1 TABLET AT ONSET OF HEADACHE ONE TIME AS NEEDED FOR MIGRAINE FOR UP TO 1 DOSE. MAY REPEAT DOSE 1 TIME.    Vibegron 75 MG tablet Take  by mouth.     No current facility-administered medications on file prior to visit.         Objective   /88   Pulse 76   Ht 161.3 cm (63.5\")   Wt 76.2 kg (168 lb)   BMI 29.29 kg/m²       Physical Exam  HENT:      Head: Normocephalic.   Neck:      Vascular: No carotid bruit.   Cardiovascular:      Rate and Rhythm: Normal rate and regular rhythm.      Pulses: Normal pulses.      Heart sounds: Normal heart sounds. No murmur heard.  Pulmonary:      Effort: Pulmonary effort is normal.      Breath sounds: Normal breath sounds.   Musculoskeletal:      Cervical back: Neck supple.      Right lower leg: No edema.      Left lower leg: No edema.   Skin:     General: Skin is dry.   Neurological:      Mental Status: She is alert and oriented to person, place, and time.   Psychiatric:         Behavior: Behavior normal.       Result Review :   The following data was reviewed by: BASIM Recinos on 12/11/2023:  proBNP   Date Value Ref Range Status   05/25/2022 1,008.0 (H) 0.0 - 900.0 pg/mL Final     CMP          9/12/2023    14:52   CMP   Glucose PLEASE DISREGARD RESULTS       BUN PLEASE DISREGARD RESULTS       Creatinine PLEASE DISREGARD RESULTS       Sodium PLEASE DISREGARD RESULTS, PATIENT TO BE RECOLLECTED ASAP       Potassium PLEASE DISREGARD RESULTS       Chloride PLEASE DISREGARD RESULTS       Calcium PLEASE DISREGARD RESULTS       BUN/Creatinine Ratio PLEASE DISREGARD RESULTS       Anion Gap PLEASE DISREGARD RESULTS           CBC w/diff          5/15/2023    16:21 9/12/2023    14:52 9/18/2023    06:46   CBC w/Diff   WBC 4.47     PLEASE DISREGARD ALL RESULTS, PATIENT TO BE RECOLLECT ASAP     " "4.49       RBC 4.73     PLEASE DISREGARD RESULTS     4.39       Hemoglobin 13.8     PLEASE DISREGARD RESULTS     12.5       Hematocrit 43.3     PLEASE DISREGARD RESULTS     38.7       MCV 91.5     PLEASE DISREGARD RESULTS     88.2       MCH 29.2     PLEASE DISREGARD RESULTS     28.5       MCHC 31.9     PLEASE DISREGARD RESULTS     32.3       RDW 12.9     PLEASE DISREGARD RESULTS     14.0       Platelets 155     PLEASE IDREGARD RESULTS     142       Neutrophil Rel % 62.0     PLEASE DISREGARD RESULTS     49.9       Immature Granulocyte Rel % 0.4     PLEASE DISREGARD RESULTS     0.4       Lymphocyte Rel % 24.8     PLEASE DISREGARD RESULTS     31.4       Monocyte Rel % 8.3     PLEASE DIREGARD RESULTS     10.5       Eosinophil Rel % 3.6     PLEASE DISREGARD RESULTS     6.5       Basophil Rel % 0.9     PLEASE DISREGARD RESULTS     1.3         Lab Results   Component Value Date    TSH 1.530 12/07/2022      Lab Results   Component Value Date    FREET4 1.37 12/07/2022      No results found for: \"DDIMERQUANT\"  No results found for: \"MG\"   No results found for: \"DIGOXIN\"   No results found for: \"TROPONINT\"          Results for orders placed during the hospital encounter of 04/07/23    Adult Transthoracic Echo Complete W/ Cont if Necessary Per Protocol    Interpretation Summary  Normal left ventricular systolic function.  Mild MR and mild TR.    [id number] Score: 4        Assessment and Plan    Diagnoses and all orders for this visit:    1. CAD S/P percutaneous coronary angioplasty (Primary)  She denies any anginal symptoms, continue aspirin 81 mg daily.    2. Primary hypertension  Currently controlled and without adverse effects from medication, continue hydrochlorothiazide 12.5 mg as needed for swelling and nebivolol 2.5 mg daily. Check BMP to assess renal function and electrolytes.  -     Basic Metabolic Panel; Future    3. Other hyperlipidemia  Lipid panel is elevated, she will not take a statin. She is trying citrus " bergamot. Repeat fasting lipid panel and hepatic function panel in 3 months to see if this will be effective enough for her.  -     Lipid Panel; Future  -     Hepatic Function Panel; Future            Follow Up   Return in about 6 months (around 6/11/2024) for Follow up with Dr Peoples.    Patient was given instructions and counseling regarding her condition or for health maintenance advice. Please see specific information pulled into the AVS if appropriate.     Kristi BRAGG Patrice  reports that she has never smoked. She has never used smokeless tobacco.           Shira Nguyễn, APRN  12/11/23  13:26 EST    Dictated Utilizing Dragon Dictation

## 2023-12-22 ENCOUNTER — OFFICE VISIT (OUTPATIENT)
Dept: FAMILY MEDICINE CLINIC | Facility: CLINIC | Age: 73
End: 2023-12-22
Payer: MEDICARE

## 2023-12-22 VITALS
WEIGHT: 170 LBS | DIASTOLIC BLOOD PRESSURE: 80 MMHG | HEART RATE: 85 BPM | OXYGEN SATURATION: 93 % | SYSTOLIC BLOOD PRESSURE: 122 MMHG | BODY MASS INDEX: 29.02 KG/M2 | HEIGHT: 64 IN | TEMPERATURE: 97.6 F

## 2023-12-22 DIAGNOSIS — G20.A1 PARKINSON'S DISEASE WITHOUT DYSKINESIA OR FLUCTUATING MANIFESTATIONS: ICD-10-CM

## 2023-12-22 DIAGNOSIS — Z98.61 CAD S/P PERCUTANEOUS CORONARY ANGIOPLASTY: ICD-10-CM

## 2023-12-22 DIAGNOSIS — I10 PRIMARY HYPERTENSION: Primary | ICD-10-CM

## 2023-12-22 DIAGNOSIS — I25.10 CAD S/P PERCUTANEOUS CORONARY ANGIOPLASTY: ICD-10-CM

## 2023-12-22 DIAGNOSIS — E78.49 OTHER HYPERLIPIDEMIA: ICD-10-CM

## 2023-12-22 LAB
ALBUMIN SERPL-MCNC: 4.2 G/DL (ref 3.5–5.2)
ALP SERPL-CCNC: 94 U/L (ref 39–117)
ALT SERPL W P-5'-P-CCNC: 10 U/L (ref 1–33)
ANION GAP SERPL CALCULATED.3IONS-SCNC: 14.4 MMOL/L (ref 5–15)
AST SERPL-CCNC: 25 U/L (ref 1–32)
BILIRUB CONJ SERPL-MCNC: <0.2 MG/DL (ref 0–0.3)
BILIRUB INDIRECT SERPL-MCNC: NORMAL MG/DL
BILIRUB SERPL-MCNC: 0.5 MG/DL (ref 0–1.2)
BUN SERPL-MCNC: 13 MG/DL (ref 8–23)
BUN/CREAT SERPL: 20 (ref 7–25)
CALCIUM SPEC-SCNC: 9.8 MG/DL (ref 8.6–10.5)
CHLORIDE SERPL-SCNC: 100 MMOL/L (ref 98–107)
CHOLEST SERPL-MCNC: 229 MG/DL (ref 0–200)
CO2 SERPL-SCNC: 25.6 MMOL/L (ref 22–29)
CREAT SERPL-MCNC: 0.65 MG/DL (ref 0.57–1)
EGFRCR SERPLBLD CKD-EPI 2021: 93.1 ML/MIN/1.73
GLUCOSE SERPL-MCNC: 87 MG/DL (ref 65–99)
HDLC SERPL-MCNC: 92 MG/DL (ref 40–60)
LDLC SERPL CALC-MCNC: 121 MG/DL (ref 0–100)
LDLC/HDLC SERPL: 1.29 {RATIO}
POTASSIUM SERPL-SCNC: 4 MMOL/L (ref 3.5–5.2)
PROT SERPL-MCNC: 7.6 G/DL (ref 6–8.5)
SODIUM SERPL-SCNC: 140 MMOL/L (ref 136–145)
TRIGL SERPL-MCNC: 93 MG/DL (ref 0–150)
VLDLC SERPL-MCNC: 16 MG/DL (ref 5–40)

## 2023-12-22 PROCEDURE — 3079F DIAST BP 80-89 MM HG: CPT | Performed by: FAMILY MEDICINE

## 2023-12-22 PROCEDURE — 99214 OFFICE O/P EST MOD 30 MIN: CPT | Performed by: FAMILY MEDICINE

## 2023-12-22 PROCEDURE — 80061 LIPID PANEL: CPT | Performed by: INTERNAL MEDICINE

## 2023-12-22 PROCEDURE — 80076 HEPATIC FUNCTION PANEL: CPT | Performed by: INTERNAL MEDICINE

## 2023-12-22 PROCEDURE — 80048 BASIC METABOLIC PNL TOTAL CA: CPT | Performed by: INTERNAL MEDICINE

## 2023-12-22 PROCEDURE — 3074F SYST BP LT 130 MM HG: CPT | Performed by: FAMILY MEDICINE

## 2023-12-22 NOTE — ASSESSMENT & PLAN NOTE
She is statin intolerant.  She is going to try a homeopathic remedy and then we will reevaluate if not she may be a candidate for something like Fadia Shields or Kobe

## 2023-12-22 NOTE — PROGRESS NOTES
Chief Complaint   Patient presents with    Follow-up     6 month     Hypertension        Subjective     Kristi Ibrahim  has a past medical history of Abnormal ECG, Allergic, Anemia, unspecified, Arthritis, Arthritis (02/14/2022), Asthma, Asthma (02/14/2022), Atrial fibrillation, Cataract, Coronary artery disease, Dental disease, Diverticulosis, Dizziness, Fibromyalgia, Fibromyalgia, primary, GERD (gastroesophageal reflux disease), H/O degenerative disc disease, Headache, Hemangioma of bone (09/10/2019), Hepatitis A, HL (hearing loss), Injury of back, Injury of neck, Leg pain, Leg swelling, Leg weakness, bilateral (09/10/2019), Long-term current use of opiate analgesic (07/08/2019), Low back pain (08/25/2020), Lumbar herniated disc, Lumbar stenosis (09/10/2019), Mitral valve insufficiency, Muscle cramps, Osteopenia, Other hyperlipidemia (08/01/2022), Scoliosis, Seasonal allergic rhinitis, Shortness of breath, Sinus infection (03/31/2022), Spondylolisthesis of cervical region (09/10/2019), Spondylolisthesis of lumbar region (08/25/2020), Tricuspid regurgitation, Urinary tract infection, and Voice hoarseness.    Hypertension-she does check her blood pressure at home.  She states as of late her blood pressure has been slightly elevated.    Hyperlipidemia-recently she did try a statin.  She states unfortunately when she did it made her Parkinson's tremors worse.    CAD-she denies any chest pain tightness or heaviness.    Parkinson's-she is stable right now.  She sees her neurologist on a regular basis.  She has a persistent resting tremor.        PHQ-2 Depression Screening  Little interest or pleasure in doing things?     Feeling down, depressed, or hopeless?     PHQ-2 Total Score     PHQ-9 Depression Screening  Little interest or pleasure in doing things?     Feeling down, depressed, or hopeless?     Trouble falling or staying asleep, or sleeping too much?     Feeling tired or having little energy?     Poor appetite or  overeating?     Feeling bad about yourself - or that you are a failure or have let yourself or your family down?     Trouble concentrating on things, such as reading the newspaper or watching television?     Moving or speaking so slowly that other people could have noticed? Or the opposite - being so fidgety or restless that you have been moving around a lot more than usual?     Thoughts that you would be better off dead, or of hurting yourself in some way?     PHQ-9 Total Score     If you checked off any problems, how difficult have these problems made it for you to do your work, take care of things at home, or get along with other people?       Allergies   Allergen Reactions    Nickel Rash    Rapeseed [Brassica Napus Seed Oil] Hives     Had to go to ER for treatment.    Ace Inhibitors Unknown (See Comments)     NOT  SURE    Acetaminophen Swelling    Brompheniramine Unknown (See Comments)     NOT SURE    Codeine Nausea And Vomiting    Diclofenac Urinary Retention     GI PAIN - ULCERS???    Flurbiprofen Unknown (See Comments)     NOT SURE    Hydrocodone Nausea Only and Nausea And Vomiting    Ibuprofen Unknown (See Comments)     NOT SURE    Indocyanine Green Unknown (See Comments)     NOT SURE    Pantoprazole Nausea And Vomiting    Propoxyphene Unknown (See Comments)     NOT SURE    Sulfamethoxazole Hives    Tramadol Other (See Comments)    Trimethoprim Hives    Vegetable Oil Hives    Iodinated Contrast Media Rash       Prior to Admission medications    Medication Sig Start Date End Date Taking? Authorizing Provider   albuterol sulfate  (90 Base) MCG/ACT inhaler Inhale 2 puffs Every 4 (Four) Hours As Needed for Wheezing. 7/11/23  Yes Kamron Taylor, DO   amantadine (SYMMETREL) 100 MG capsule  9/21/22  Yes ProviderSoo MD   aspirin 81 MG tablet Take 1 tablet by mouth Every Other Day. 3/10/14  Yes ProviderSoo MD   baclofen (LIORESAL) 10 MG tablet Take 1 tablet by mouth 3 (Three) Times  a Day. 8/23/21  Yes Soo Dodd MD   carbidopa-levodopa (SINEMET)  MG per tablet Take 1.5 tablets by mouth 3 (Three) Times a Day. 7/13/20  Yes Soo Dodd MD   cetirizine (zyrTEC) 10 MG tablet Take 1 tablet by mouth Daily.   Yes Soo Dodd MD   CITRUS BERGAMOT PO Take 500 mg by mouth 2 (Two) Times a Day.   Yes Soo Dodd MD   ferrous sulfate 325 (65 FE) MG EC tablet Take 1 tablet by mouth Daily With Breakfast. 6/21/23 6/20/24 Yes Pamela Wyman APRN   fluticasone (FLOVENT HFA) 110 MCG/ACT inhaler Inhale 2 puffs 2 (Two) Times a Day. 3/31/22  Yes Barry Easley,    hydroCHLOROthiazide (MICROZIDE) 12.5 MG capsule Take 1 capsule by mouth Daily.  Patient taking differently: Take 1 capsule by mouth Daily. Pt only takes twice a week 9/29/22  Yes Malina Breen MD   lidocaine (LIDODERM) 5 %  11/10/21  Yes ProviderSoo MD   magnesium oxide (MAG-OX) 400 MG tablet Take 1 tablet by mouth 2 (Two) Times a Day.   Yes Soo Dodd MD   meclizine (ANTIVERT) 25 MG tablet Take 1 tablet by mouth Daily. 3/14/16  Yes Soo Dodd MD   Multiple Vitamins-Minerals (HAIR SKIN AND NAILS FORMULA PO) Take 1 tablet by mouth Daily.   Yes Soo Dodd MD   nebivolol (BYSTOLIC) 2.5 MG tablet TAKE 1 TABLET DAILY 11/10/23  Yes Malina Breen MD   nitroglycerin (NITROSTAT) 0.4 MG SL tablet Place 1 tablet under the tongue Every 5 (Five) Minutes As Needed for Chest Pain. 5/18/17  Yes Malina Breen MD   ondansetron (ZOFRAN) 4 MG tablet ondansetron HCl 4 mg tablet   Yes Soo Dodd MD   Probiotic Product (SOLUBLE FIBER/PROBIOTICS PO) Take 1 tablet by mouth Daily.   Yes Soo Dodd MD   rOPINIRole (Requip) 1 MG tablet Take 1-2 tablets 1 hour before bedtime for restless legs. 6/21/23  Yes Pamela Wyman APRN   SUMAtriptan (IMITREX) 50 MG tablet TAKE 1 TABLET AT ONSET OF HEADACHE ONE TIME AS NEEDED FOR  MIGRAINE FOR UP TO 1 DOSE. MAY REPEAT DOSE 1 TIME. 7/17/23  Yes Kamron Taylor,    Vibegron 75 MG tablet Take  by mouth.   Yes Provider, Soo, MD        Patient Active Problem List   Diagnosis    Primary hypertension    CAD S/P percutaneous coronary angioplasty    Anemia, unspecified    Arthritis    Asthma    Degeneration of lumbar intervertebral disc    Left lumbar radiculopathy    Lumbar spondylosis    Dyskinesia    Dystonia    GERD (gastroesophageal reflux disease)    Hemangioma    Hepatitis A    Parkinson disease    Seasonal allergic rhinitis    Spondylolisthesis of cervical region    Migraine with aura and without status migrainosus, not intractable    Postmenopausal    Other hyperlipidemia    Dysuria    Alopecia    Restless leg syndrome        Past Surgical History:   Procedure Laterality Date    ADENOIDECTOMY      BREAST SURGERY  Multiple cysts removed    CARDIAC CATHETERIZATION      CARDIAC CATHETERIZATION N/A 04/23/2018    Procedure: Left Heart Cath;  Surgeon: Malina Breen MD;  Location:  [facility];  Service: Cardiovascular    CARDIAC CATHETERIZATION N/A 04/23/2018    Procedure: Coronary angiography;  Surgeon: Malina Breen MD;  Location:  [facility];  Service: Cardiovascular    CARDIAC CATHETERIZATION N/A 04/23/2018    Procedure: Left ventriculography;  Surgeon: Malina Breen MD;  Location:  [facility];  Service: Cardiovascular    CARPAL TUNNEL RELEASE Bilateral     CHOLECYSTECTOMY      COLONOSCOPY      CORONARY ANGIOPLASTY      CORONARY STENT PLACEMENT      EYE SURGERY  Cateracts    FOOT SURGERY      HAND SURGERY      KNEE ARTHROSCOPY Left     OTHER SURGICAL HISTORY      JOINT SURGERY    RIB BIOPSY, RIB RESECTION      SPINAL FUSION      TONSILLECTOMY AND ADENOIDECTOMY      TUBAL ABDOMINAL LIGATION         Social History     Socioeconomic History    Marital status:    Tobacco Use    Smoking status: Never  "   Smokeless tobacco: Never   Vaping Use    Vaping Use: Never used   Substance and Sexual Activity    Alcohol use: Never    Drug use: Never    Sexual activity: Not Currently     Partners: Male     Birth control/protection: Post-menopausal       Family History   Problem Relation Age of Onset    Heart disease Mother     Stroke Mother     Arthritis Mother     Osteoarthritis Mother     Heart disease Father     Hypertension Father     Arthritis Father     Stomach cancer Father     Rheum arthritis Father     Heart disease Brother     Hypertension Brother     Arthritis Brother     Breast cancer Sister     Arthritis Sister     Cancer Sister         Breast    Cancer Maternal Grandmother         Lung    Cancer Sister         Breast    Diabetes Brother     Hypertension Brother        Family history, surgical history, past medical history, Allergies and meds reviewed with patient today and updated in [facility] EMR.     ROS:  Review of Systems   Constitutional:  Negative for fatigue.   HENT:  Negative for congestion, postnasal drip and rhinorrhea.    Eyes:  Negative for blurred vision and visual disturbance.   Respiratory:  Positive for cough (chronic). Negative for chest tightness, shortness of breath and wheezing.    Cardiovascular:  Negative for chest pain and palpitations.   Gastrointestinal:  Negative for constipation and diarrhea.   Allergic/Immunologic: Positive for environmental allergies.   Neurological:  Positive for tremors. Negative for headache.   Psychiatric/Behavioral:  Negative for depressed mood. The patient is not nervous/anxious.        OBJECTIVE:  Vitals:    12/22/23 1421 12/22/23 1515   BP: 149/81 122/80   BP Location: Left arm Left arm   Patient Position: Sitting Sitting   Cuff Size:  Adult   Pulse: 85    Temp: 97.6 °F (36.4 °C)    SpO2: 93%    Weight: 77.1 kg (170 lb)    Height: 161.3 cm (63.5\")      No results found.   Body mass index is 29.64 kg/m².  No LMP recorded. Patient is postmenopausal.    Physical " Exam  Vitals and nursing note reviewed.   Constitutional:       General: She is not in acute distress.     Appearance: Normal appearance. She is normal weight.   HENT:      Head: Normocephalic.      Right Ear: Tympanic membrane, ear canal and external ear normal.      Left Ear: Tympanic membrane, ear canal and external ear normal.      Nose: Nose normal.      Mouth/Throat:      Mouth: Mucous membranes are moist.      Pharynx: Oropharynx is clear.   Eyes:      General: No scleral icterus.     Conjunctiva/sclera: Conjunctivae normal.      Pupils: Pupils are equal, round, and reactive to light.   Cardiovascular:      Rate and Rhythm: Normal rate and regular rhythm.      Pulses: Normal pulses.      Heart sounds: Normal heart sounds. No murmur heard.  Pulmonary:      Effort: Pulmonary effort is normal.      Breath sounds: Normal breath sounds. No wheezing, rhonchi or rales.   Musculoskeletal:      Cervical back: Neck supple. No rigidity or tenderness.   Lymphadenopathy:      Cervical: No cervical adenopathy.   Skin:     General: Skin is warm and dry.      Coloration: Skin is not jaundiced.      Findings: No rash.   Neurological:      General: No focal deficit present.      Mental Status: She is alert and oriented to person, place, and time.      Motor: Tremor present.   Psychiatric:         Mood and Affect: Mood normal.         Thought Content: Thought content normal.         Judgment: Judgment normal.         Procedures    No visits with results within 30 Day(s) from this visit.   Latest known visit with results is:   Hospital Outpatient Visit on 10/12/2023   Component Date Value Ref Range Status    Case Report 10/12/2023    Final                    Value:Surgical Pathology Report                         Case: [id number]                                  Authorizing Provider:  Meek De La Cruz MD  Collected:           10/12/2023 08:39 AM          Ordering Location:     [facility]      Received:             10/12/2023 08:58 AM                                 MAMMOGRAPHY                                                                  Pathologist:           Talib Buckner MD                                                            Specimen:    Breast, Left, LEFTbreastSTEREObx;RETRO/CENTRAL                                             Clinical Information 10/12/2023    Final                    Value:This result contains rich text formatting which cannot be displayed here.    Final Diagnosis 10/12/2023    Final                    Value:This result contains rich text formatting which cannot be displayed here.    Gross Description 10/12/2023    Final                    Value:This result contains rich text formatting which cannot be displayed here.    Special Stains 10/12/2023    Final                    Value:This result contains rich text formatting which cannot be displayed here.    Microscopic Description 10/12/2023    Final                    Value:This result contains rich text formatting which cannot be displayed here.       ASSESSMENT/ PLAN:    Diagnoses and all orders for this visit:    1. Primary hypertension (Primary)  Assessment & Plan:  Her blood pressure is a little elevated here today.  Will recheck it 1 more time.      2. Other hyperlipidemia  Assessment & Plan:  She is statin intolerant.  She is going to try a homeopathic remedy and then we will reevaluate if not she may be a candidate for something like Leqvio Repatha or Praluent      3. CAD S/P percutaneous coronary angioplasty  Assessment & Plan:  She is doing well and denies any current anginal-like symptoms.      4. Parkinson's disease without dyskinesia or fluctuating manifestations  Assessment & Plan:  She sees her neurologist on a regular basis.  She states ever since the statin her tremors have been worse.                 Orders Placed Today:     No orders of the defined types were placed in this encounter.       Management Plan:     An After  Visit Summary was printed and given to the patient at discharge.    Follow-up: Return in about 6 months (around 6/22/2024) for Recheck.    Kamron Taylor,  12/22/2023 15:17 EST  This note was electronically signed.

## 2023-12-22 NOTE — ASSESSMENT & PLAN NOTE
She sees her neurologist on a regular basis.  She states ever since the statin her tremors have been worse.

## 2023-12-26 ENCOUNTER — TELEPHONE (OUTPATIENT)
Dept: CARDIOLOGY | Facility: CLINIC | Age: 73
End: 2023-12-26
Payer: MEDICARE

## 2023-12-26 RX ORDER — CHLORAL HYDRATE 500 MG
1000 CAPSULE ORAL 2 TIMES DAILY WITH MEALS
Qty: 180 CAPSULE | Refills: 3 | Status: SHIPPED | OUTPATIENT
Start: 2023-12-26

## 2023-12-26 NOTE — TELEPHONE ENCOUNTER
----- Message from BASIM Mendez sent at 12/23/2023  5:45 AM EST -----  Labs look good except lipidsvare elevated, recommend omega 4 fatty acid 1000 mg twice daily.

## 2024-03-26 ENCOUNTER — TELEPHONE (OUTPATIENT)
Dept: FAMILY MEDICINE CLINIC | Facility: CLINIC | Age: 74
End: 2024-03-26
Payer: MEDICARE

## 2024-03-26 NOTE — TELEPHONE ENCOUNTER
Caller: Kristi Ibrahim    Relationship to patient: Self    Best call back number: [phone]     Patient is needing: PATIENT IS CALLING TO GIVE [employer] AN UPDATE O THE NAME OF THE RASH. THE RASH IS CALLED LIVEDO  RETICULARIS RASH. PLEASE CALL AND ADVISE.

## 2024-03-27 NOTE — TELEPHONE ENCOUNTER
Spoke to patient and relayed As long as it is getting better there is nothing else to do, but it may take 6 months to a year for it to completely go away or it may not go away at all.   Pamela Wyman, APRN

## 2024-04-08 ENCOUNTER — DOCUMENTATION (OUTPATIENT)
Dept: PHYSICAL THERAPY | Facility: CLINIC | Age: 74
End: 2024-04-08
Payer: MEDICARE

## 2024-04-08 NOTE — PROGRESS NOTES
Discharge Summary  Discharge Summary from Physical Therapy Report  [address] [facility] [place] [address]      Dates  PT visit: 7/31/23 - 8/25/23  Number of Visits: 16         Goals: Partially Met    Discharge Plan: Continue with current home exercise program as instructed    Comments Plan Goals: 1. The patient has limited strength of the B shoulders and B hips.              LTG 1: 12 weeks:  The patient will demonstrate 4+ /5 strength for B shoulder flexion, abduction, external rotation, and internal rotation in order to demonstrate improved shoulder stability.                          STATUS:  MET              LTG 1a: 12 weeks:  The patient will demonstrate 4+/5 strength for B hip flexion, abduction, and extension in order to improve hip stability.                          STATUS:  not met              STG 1b: 6 weeks:  The patient will be independent with HEP.               TREATMENT: Manual therapy, therapeutic exercise, home exercise instruction, and modalities as needed to include: electrical stimulation, ultrasound, moist heat, and ice.        2. The patient has gait dysfunction.              LTG 2: 12 weeks:  The patient will demonstrate > 26 / 30 on the Functional Gait Assessment for improved functional mobility.                           STATUS:  not met              STG 2a: The patient will demonstrate > 24 / 30 on the Functional Gait Assessment for improved functional mobility.                           STATUS:  not met              TREATMENT: Gait training, aquatic therapy, therapeutic exercise, and home exercise instruction.    Date of Discharge 4/8/24        Colby Berger PT  Physical Therapist    Electronically signed 4/8/2024    [place] License: PT - [id number]

## 2024-05-01 RX ORDER — NEBIVOLOL 2.5 MG/1
TABLET ORAL
Qty: 90 TABLET | Refills: 1 | OUTPATIENT
Start: 2024-05-01

## 2024-06-13 ENCOUNTER — OFFICE VISIT (OUTPATIENT)
Dept: CARDIOLOGY | Facility: CLINIC | Age: 74
End: 2024-06-13
Payer: MEDICARE

## 2024-06-13 VITALS
DIASTOLIC BLOOD PRESSURE: 73 MMHG | WEIGHT: 174 LBS | BODY MASS INDEX: 29.71 KG/M2 | SYSTOLIC BLOOD PRESSURE: 134 MMHG | HEIGHT: 64 IN | HEART RATE: 76 BPM

## 2024-06-13 DIAGNOSIS — I10 PRIMARY HYPERTENSION: ICD-10-CM

## 2024-06-13 DIAGNOSIS — E78.49 OTHER HYPERLIPIDEMIA: ICD-10-CM

## 2024-06-13 DIAGNOSIS — I25.10 CAD S/P PERCUTANEOUS CORONARY ANGIOPLASTY: ICD-10-CM

## 2024-06-13 DIAGNOSIS — Z98.61 CAD S/P PERCUTANEOUS CORONARY ANGIOPLASTY: ICD-10-CM

## 2024-06-13 PROCEDURE — 99214 OFFICE O/P EST MOD 30 MIN: CPT | Performed by: INTERNAL MEDICINE

## 2024-06-13 PROCEDURE — 3078F DIAST BP <80 MM HG: CPT | Performed by: INTERNAL MEDICINE

## 2024-06-13 PROCEDURE — 3075F SYST BP GE 130 - 139MM HG: CPT | Performed by: INTERNAL MEDICINE

## 2024-06-13 RX ORDER — ESTRADIOL 0.1 MG/G
2 CREAM VAGINAL 2 TIMES WEEKLY
COMMUNITY
Start: 2024-01-08

## 2024-06-13 NOTE — PROGRESS NOTES
"Chief Complaint  Follow-up, Coronary Artery Disease, and Hyperlipidemia    Subjective    Patient has been doing well no experience or chest pain no change in breathing ability no new problems last visit  Past Medical History:   Diagnosis Date    Abnormal ECG     Allergic     Anemia, unspecified     Arthritis     Arthritis 02/14/2022    Asthma     \"ALLERGIC\"    Asthma 02/14/2022    Atrial fibrillation     Cataract     Coronary artery disease     Dental disease     Diverticulosis     Dizziness     Fibromyalgia     Fibromyalgia, primary     GERD (gastroesophageal reflux disease)     H/O degenerative disc disease     Headache     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    Hepatitis A     HL (hearing loss)     Hypertension     Injury of back     Injury of neck     Leg pain     Leg swelling     Leg weakness, bilateral 09/10/2019    Long-term current use of opiate analgesic 07/08/2019    Low back pain 08/25/2020    Lumbar herniated disc     Lumbar stenosis 09/10/2019    Mitral valve insufficiency     Muscle cramps     Osteopenia     Other hyperlipidemia 08/01/2022    Scoliosis     Seasonal allergic rhinitis     Shortness of breath     Sinus infection 03/31/2022    Spondylolisthesis of cervical region 09/10/2019    Spondylolisthesis of lumbar region 08/25/2020    Tricuspid regurgitation     Urinary tract infection     Voice hoarseness          Current Outpatient Medications:     albuterol sulfate  (90 Base) MCG/ACT inhaler, Inhale 2 puffs Every 4 (Four) Hours As Needed for Wheezing., Disp: 18 g, Rfl: 11    aspirin 81 MG tablet, Take 1 tablet by mouth Every Other Day., Disp: , Rfl:     baclofen (LIORESAL) 10 MG tablet, Take 1 tablet by mouth 3 (Three) Times a Day., Disp: , Rfl:     carbidopa-levodopa (SINEMET)  MG per tablet, Take 1.5 tablets by mouth 3 (Three) Times a Day., Disp: , Rfl:     CITRUS BERGAMOT PO, Take 500 mg by mouth 2 (Two) Times a Day., Disp: , Rfl:     estradiol (ESTRACE) 0.1 MG/GM vaginal cream, " Insert 2 g into the vagina 2 (Two) Times a Week., Disp: , Rfl:     ferrous sulfate 325 (65 FE) MG EC tablet, Take 1 tablet by mouth Daily With Breakfast., Disp: 90 tablet, Rfl: 3    hydroCHLOROthiazide (MICROZIDE) 12.5 MG capsule, Take 1 capsule by mouth Daily., Disp: 30 capsule, Rfl: 11    magnesium oxide (MAG-OX) 400 MG tablet, Take 1 tablet by mouth 2 (Two) Times a Day., Disp: , Rfl:     meclizine (ANTIVERT) 25 MG tablet, Take 1 tablet by mouth Daily., Disp: , Rfl:     Multiple Vitamins-Minerals (HAIR SKIN AND NAILS FORMULA PO), Take 1 tablet by mouth Daily., Disp: , Rfl:     nebivolol (BYSTOLIC) 2.5 MG tablet, TAKE 1 TABLET DAILY, Disp: 90 tablet, Rfl: 1    nitroglycerin (NITROSTAT) 0.4 MG SL tablet, Place 1 tablet under the tongue Every 5 (Five) Minutes As Needed for Chest Pain., Disp: 25 tablet, Rfl: 5    Omega-3 Fatty Acids (fish oil) 1000 MG capsule capsule, Take 1 capsule by mouth 2 (Two) Times a Day With Meals., Disp: 180 capsule, Rfl: 3    Probiotic Product (SOLUBLE FIBER/PROBIOTICS PO), Take 1 tablet by mouth Daily., Disp: , Rfl:     rOPINIRole (Requip) 1 MG tablet, Take 1-2 tablets 1 hour before bedtime for restless legs., Disp: 60 tablet, Rfl: 5    SUMAtriptan (IMITREX) 50 MG tablet, TAKE 1 TABLET AT ONSET OF HEADACHE ONE TIME AS NEEDED FOR MIGRAINE FOR UP TO 1 DOSE. MAY REPEAT DOSE 1 TIME., Disp: 9 tablet, Rfl: 5    Vibegron 75 MG tablet, Take  by mouth., Disp: , Rfl:     lidocaine (LIDODERM) 5 %, , Disp: , Rfl:     ondansetron (ZOFRAN) 4 MG tablet, ondansetron HCl 4 mg tablet (Patient not taking: Reported on 6/13/2024), Disp: , Rfl:     Medications Discontinued During This Encounter   Medication Reason    cetirizine (zyrTEC) 10 MG tablet *Therapy completed    fluticasone (FLOVENT HFA) 110 MCG/ACT inhaler *Therapy completed    amantadine (SYMMETREL) 100 MG capsule Side effects     Allergies   Allergen Reactions    Nickel Rash    Rapeseed [Brassica Napus Seed Oil] Hives     Had to go to ER for  "treatment.    Ace Inhibitors Unknown (See Comments)     NOT  SURE    Acetaminophen Swelling    Brompheniramine Unknown (See Comments)     NOT SURE    Codeine Nausea And Vomiting    Diclofenac Urinary Retention     GI PAIN - ULCERS???    Flurbiprofen Unknown (See Comments)     NOT SURE    Hydrocodone Nausea Only and Nausea And Vomiting    Ibuprofen Unknown (See Comments)     NOT SURE    Indocyanine Green Unknown (See Comments)     NOT SURE    Pantoprazole Nausea And Vomiting    Propoxyphene Unknown (See Comments)     NOT SURE    Statins Other (See Comments)     tremors    Sulfamethoxazole Hives    Tramadol Other (See Comments)    Trimethoprim Hives    Vegetable Oil Hives    Iodinated Contrast Media Rash        Social History     Tobacco Use    Smoking status: Never    Smokeless tobacco: Never   Vaping Use    Vaping status: Never Used   Substance Use Topics    Alcohol use: Never    Drug use: Never       Family History   Problem Relation Age of Onset    Heart disease Mother     Stroke Mother     Arthritis Mother     Osteoarthritis Mother     Heart disease Father     Hypertension Father     Arthritis Father     Stomach cancer Father     Rheum arthritis Father     Heart disease Brother     Hypertension Brother     Arthritis Brother     Breast cancer Sister     Arthritis Sister     Cancer Sister         Breast    Cancer Maternal Grandmother         Lung    Cancer Sister         Breast    Diabetes Brother     Hypertension Brother         Objective     /73   Pulse 76   Ht 161.3 cm (63.5\")   Wt 78.9 kg (174 lb)   BMI 30.34 kg/m²       Physical Exam    General Appearance:   no acute distress  Alert and oriented x3  HENT:   lips not cyanotic  Atraumatic  Neck:  No jvd   supple  Respiratory:  no respiratory distress  normal breath sounds  no rales  Cardiovascular:  Regular rate and rhythm  no S3, no S4   no murmur  no rub  Extremities  No cyanosis  lower extremity edema: none    Skin:   warm, dry  No " zainab      Result Review :     proBNP   Date Value Ref Range Status   05/25/2022 1,008.0 (H) 0.0 - 900.0 pg/mL Final     CMP          9/12/2023    14:52 12/22/2023    15:29   CMP   Glucose  87    Glucose PLEASE DISREGARD RESULTS        BUN PLEASE DISREGARD RESULTS     13    Creatinine PLEASE DISREGARD RESULTS     0.65    EGFR  93.1    Sodium PLEASE DISREGARD RESULTS, PATIENT TO BE RECOLLECTED ASAP     140    Potassium PLEASE DISREGARD RESULTS     4.0    Chloride PLEASE DISREGARD RESULTS     100    Calcium PLEASE DISREGARD RESULTS     9.8    Total Protein  7.6    Albumin  4.2    Total Bilirubin  0.5    Alkaline Phosphatase  94    AST (SGOT)  25    ALT (SGPT)  10    BUN/Creatinine Ratio PLEASE DISREGARD RESULTS     20.0    Anion Gap PLEASE DISREGARD RESULTS     14.4       Details          This result is from an external source.             CBC w/diff          9/12/2023    14:52 9/18/2023    06:46   CBC w/Diff   WBC PLEASE DISREGARD ALL RESULTS, PATIENT TO BE RECOLLECT ASAP     4.49       RBC PLEASE DISREGARD RESULTS     4.39       Hemoglobin PLEASE DISREGARD RESULTS     12.5       Hematocrit PLEASE DISREGARD RESULTS     38.7       MCV PLEASE DISREGARD RESULTS     88.2       MCH PLEASE DISREGARD RESULTS     28.5       MCHC PLEASE DISREGARD RESULTS     32.3       RDW PLEASE DISREGARD RESULTS     14.0       Platelets PLEASE IDREGARD RESULTS     142       Neutrophil Rel % PLEASE DISREGARD RESULTS     49.9       Immature Granulocyte Rel % PLEASE DISREGARD RESULTS     0.4       Lymphocyte Rel % PLEASE DISREGARD RESULTS     31.4       Monocyte Rel % PLEASE DIREGARD RESULTS     10.5       Eosinophil Rel % PLEASE DISREGARD RESULTS     6.5       Basophil Rel % PLEASE DISREGARD RESULTS     1.3          Details          This result is from an external source.              Lab Results   Component Value Date    TSH 1.530 12/07/2022      Lab Results   Component Value Date    FREET4 1.37 12/07/2022      No results found for:  "\"DDIMERQUANT\"  No results found for: \"MG\"   No results found for: \"DIGOXIN\"   No results found for: \"TROPONINT\"        Lipid Panel          12/22/2023    15:29   Lipid Panel   Total Cholesterol 229    Triglycerides 93    HDL Cholesterol 92    VLDL Cholesterol 16    LDL Cholesterol  121    LDL/HDL Ratio 1.29      No results found for: \"POCTROP\"    Results for orders placed during the hospital encounter of 04/07/23    Adult Transthoracic Echo Complete W/ Cont if Necessary Per Protocol    Interpretation Summary  Normal left ventricular systolic function.  Mild MR and mild TR.       ECG 12 Lead    Date/Time: 6/13/2024 3:35 PM  Performed by: Renato Peoples MD    Authorized by: Renato Peoples MD  Comparison: compared with previous ECG   Similar to previous ECG  Rhythm: sinus tachycardia  Comments: Ventricular trigeminy  Inferior MI old  Consider anterior MI                 Diagnoses and all orders for this visit:    1. Other hyperlipidemia  Assessment & Plan:  Patient not on any cholesterol medication due to concerned about potential side effects want to try to hold off and do some natural remedies we will going repeat lipids to see if any improvement if not could consider us discussed addition of PSK 9 orders and next visit    Orders:  -     Lipid Panel; Future    2. CAD S/P percutaneous coronary angioplasty  Assessment & Plan:  Patient is doing well no ongoing angina continue with chronic aspirin 81 mg daily      Orders:  -     ECG 12 Lead    3. Primary hypertension            Follow Up     Return in about 6 months (around 12/13/2024) for Follow with Shira Nguyễn, EKG with F/U.          Patient was given instructions and counseling regarding her condition or for health maintenance advice. Please see specific information pulled into the AVS if appropriate.       "

## 2024-06-13 NOTE — ASSESSMENT & PLAN NOTE
Patient not on any cholesterol medication due to concerned about potential side effects want to try to hold off and do some natural remedies we will going repeat lipids to see if any improvement if not could consider us discussed addition of PSK 9 orders and next visit

## 2024-06-14 NOTE — PROGRESS NOTES
"Chief Complaint  Follow-up, Coronary Artery Disease, and Hyperlipidemia    Subjective    Patient has been doing well no experience or chest pain no change in breathing ability no new problems last visit  Past Medical History:   Diagnosis Date    Abnormal ECG     Allergic     Anemia, unspecified     Arthritis     Arthritis 02/14/2022    Asthma     \"ALLERGIC\"    Asthma 02/14/2022    Atrial fibrillation     Cataract     Coronary artery disease     Dental disease     Diverticulosis     Dizziness     Fibromyalgia     Fibromyalgia, primary     GERD (gastroesophageal reflux disease)     H/O degenerative disc disease     Headache     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    Hepatitis A     HL (hearing loss)     Hypertension     Injury of back     Injury of neck     Leg pain     Leg swelling     Leg weakness, bilateral 09/10/2019    Long-term current use of opiate analgesic 07/08/2019    Low back pain 08/25/2020    Lumbar herniated disc     Lumbar stenosis 09/10/2019    Mitral valve insufficiency     Muscle cramps     Osteopenia     Other hyperlipidemia 08/01/2022    Scoliosis     Seasonal allergic rhinitis     Shortness of breath     Sinus infection 03/31/2022    Spondylolisthesis of cervical region 09/10/2019    Spondylolisthesis of lumbar region 08/25/2020    Tricuspid regurgitation     Urinary tract infection     Voice hoarseness          Current Outpatient Medications:     albuterol sulfate  (90 Base) MCG/ACT inhaler, Inhale 2 puffs Every 4 (Four) Hours As Needed for Wheezing., Disp: 18 g, Rfl: 11    aspirin 81 MG tablet, Take 1 tablet by mouth Every Other Day., Disp: , Rfl:     baclofen (LIORESAL) 10 MG tablet, Take 1 tablet by mouth 3 (Three) Times a Day., Disp: , Rfl:     carbidopa-levodopa (SINEMET)  MG per tablet, Take 1.5 tablets by mouth 3 (Three) Times a Day., Disp: , Rfl:     CITRUS BERGAMOT PO, Take 500 mg by mouth 2 (Two) Times a Day., Disp: , Rfl:     estradiol (ESTRACE) 0.1 MG/GM vaginal cream, " Insert 2 g into the vagina 2 (Two) Times a Week., Disp: , Rfl:     ferrous sulfate 325 (65 FE) MG EC tablet, Take 1 tablet by mouth Daily With Breakfast., Disp: 90 tablet, Rfl: 3    hydroCHLOROthiazide (MICROZIDE) 12.5 MG capsule, Take 1 capsule by mouth Daily., Disp: 30 capsule, Rfl: 11    magnesium oxide (MAG-OX) 400 MG tablet, Take 1 tablet by mouth 2 (Two) Times a Day., Disp: , Rfl:     meclizine (ANTIVERT) 25 MG tablet, Take 1 tablet by mouth Daily., Disp: , Rfl:     Multiple Vitamins-Minerals (HAIR SKIN AND NAILS FORMULA PO), Take 1 tablet by mouth Daily., Disp: , Rfl:     nebivolol (BYSTOLIC) 2.5 MG tablet, TAKE 1 TABLET DAILY, Disp: 90 tablet, Rfl: 1    nitroglycerin (NITROSTAT) 0.4 MG SL tablet, Place 1 tablet under the tongue Every 5 (Five) Minutes As Needed for Chest Pain., Disp: 25 tablet, Rfl: 5    Omega-3 Fatty Acids (fish oil) 1000 MG capsule capsule, Take 1 capsule by mouth 2 (Two) Times a Day With Meals., Disp: 180 capsule, Rfl: 3    Probiotic Product (SOLUBLE FIBER/PROBIOTICS PO), Take 1 tablet by mouth Daily., Disp: , Rfl:     rOPINIRole (Requip) 1 MG tablet, Take 1-2 tablets 1 hour before bedtime for restless legs., Disp: 60 tablet, Rfl: 5    SUMAtriptan (IMITREX) 50 MG tablet, TAKE 1 TABLET AT ONSET OF HEADACHE ONE TIME AS NEEDED FOR MIGRAINE FOR UP TO 1 DOSE. MAY REPEAT DOSE 1 TIME., Disp: 9 tablet, Rfl: 5    Vibegron 75 MG tablet, Take  by mouth., Disp: , Rfl:     lidocaine (LIDODERM) 5 %, , Disp: , Rfl:     ondansetron (ZOFRAN) 4 MG tablet, ondansetron HCl 4 mg tablet (Patient not taking: Reported on 6/13/2024), Disp: , Rfl:     Medications Discontinued During This Encounter   Medication Reason    cetirizine (zyrTEC) 10 MG tablet *Therapy completed    fluticasone (FLOVENT HFA) 110 MCG/ACT inhaler *Therapy completed    amantadine (SYMMETREL) 100 MG capsule Side effects     Allergies   Allergen Reactions    Nickel Rash    Rapeseed [Brassica Napus Seed Oil] Hives     Had to go to ER for  "treatment.    Ace Inhibitors Unknown (See Comments)     NOT  SURE    Acetaminophen Swelling    Brompheniramine Unknown (See Comments)     NOT SURE    Codeine Nausea And Vomiting    Diclofenac Urinary Retention     GI PAIN - ULCERS???    Flurbiprofen Unknown (See Comments)     NOT SURE    Hydrocodone Nausea Only and Nausea And Vomiting    Ibuprofen Unknown (See Comments)     NOT SURE    Indocyanine Green Unknown (See Comments)     NOT SURE    Pantoprazole Nausea And Vomiting    Propoxyphene Unknown (See Comments)     NOT SURE    Statins Other (See Comments)     tremors    Sulfamethoxazole Hives    Tramadol Other (See Comments)    Trimethoprim Hives    Vegetable Oil Hives    Iodinated Contrast Media Rash        Social History     Tobacco Use    Smoking status: Never    Smokeless tobacco: Never   Vaping Use    Vaping status: Never Used   Substance Use Topics    Alcohol use: Never    Drug use: Never       Family History   Problem Relation Age of Onset    Heart disease Mother     Stroke Mother     Arthritis Mother     Osteoarthritis Mother     Heart disease Father     Hypertension Father     Arthritis Father     Stomach cancer Father     Rheum arthritis Father     Heart disease Brother     Hypertension Brother     Arthritis Brother     Breast cancer Sister     Arthritis Sister     Cancer Sister         Breast    Cancer Maternal Grandmother         Lung    Cancer Sister         Breast    Diabetes Brother     Hypertension Brother         Objective     /73   Pulse 76   Ht 161.3 cm (63.5\")   Wt 78.9 kg (174 lb)   BMI 30.34 kg/m²       Physical Exam    General Appearance:   no acute distress  Alert and oriented x3  HENT:   lips not cyanotic  Atraumatic  Neck:  No jvd   supple  Respiratory:  no respiratory distress  normal breath sounds  no rales  Cardiovascular:  Regular rate and rhythm  no S3, no S4   no murmur  no rub  Extremities  No cyanosis  lower extremity edema: none    Skin:   warm, dry  No " zainab      Result Review :     proBNP   Date Value Ref Range Status   05/25/2022 1,008.0 (H) 0.0 - 900.0 pg/mL Final     CMP          9/12/2023    14:52 12/22/2023    15:29   CMP   Glucose  87    Glucose PLEASE DISREGARD RESULTS        BUN PLEASE DISREGARD RESULTS     13    Creatinine PLEASE DISREGARD RESULTS     0.65    EGFR  93.1    Sodium PLEASE DISREGARD RESULTS, PATIENT TO BE RECOLLECTED ASAP     140    Potassium PLEASE DISREGARD RESULTS     4.0    Chloride PLEASE DISREGARD RESULTS     100    Calcium PLEASE DISREGARD RESULTS     9.8    Total Protein  7.6    Albumin  4.2    Total Bilirubin  0.5    Alkaline Phosphatase  94    AST (SGOT)  25    ALT (SGPT)  10    BUN/Creatinine Ratio PLEASE DISREGARD RESULTS     20.0    Anion Gap PLEASE DISREGARD RESULTS     14.4       Details          This result is from an external source.             CBC w/diff          9/12/2023    14:52 9/18/2023    06:46   CBC w/Diff   WBC PLEASE DISREGARD ALL RESULTS, PATIENT TO BE RECOLLECT ASAP     4.49       RBC PLEASE DISREGARD RESULTS     4.39       Hemoglobin PLEASE DISREGARD RESULTS     12.5       Hematocrit PLEASE DISREGARD RESULTS     38.7       MCV PLEASE DISREGARD RESULTS     88.2       MCH PLEASE DISREGARD RESULTS     28.5       MCHC PLEASE DISREGARD RESULTS     32.3       RDW PLEASE DISREGARD RESULTS     14.0       Platelets PLEASE IDREGARD RESULTS     142       Neutrophil Rel % PLEASE DISREGARD RESULTS     49.9       Immature Granulocyte Rel % PLEASE DISREGARD RESULTS     0.4       Lymphocyte Rel % PLEASE DISREGARD RESULTS     31.4       Monocyte Rel % PLEASE DIREGARD RESULTS     10.5       Eosinophil Rel % PLEASE DISREGARD RESULTS     6.5       Basophil Rel % PLEASE DISREGARD RESULTS     1.3          Details          This result is from an external source.              Lab Results   Component Value Date    TSH 1.530 12/07/2022      Lab Results   Component Value Date    FREET4 1.37 12/07/2022      No results found for:  "\"DDIMERQUANT\"  No results found for: \"MG\"   No results found for: \"DIGOXIN\"   No results found for: \"TROPONINT\"        Lipid Panel          12/22/2023    15:29   Lipid Panel   Total Cholesterol 229    Triglycerides 93    HDL Cholesterol 92    VLDL Cholesterol 16    LDL Cholesterol  121    LDL/HDL Ratio 1.29      No results found for: \"POCTROP\"    Results for orders placed during the hospital encounter of 04/07/23    Adult Transthoracic Echo Complete W/ Cont if Necessary Per Protocol    Interpretation Summary  Normal left ventricular systolic function.  Mild MR and mild TR.    Assessment  Diagnoses and all orders for this visit:     1. Other hyperlipidemia  Assessment & Plan:  Patient not on any cholesterol medication due to concerned about potential side effects want to try to hold off and do some natural remedies we will going repeat lipids to see if any improvement if not could consider us discussed addition of PSK 9 orders and next visit     Orders:  -     Lipid Panel; Future     2. CAD S/P percutaneous coronary angioplasty  Assessment & Plan:  Patient is doing well no ongoing angina continue with chronic aspirin 81 mg daily        Orders:  -     ECG 12 Lead     3. Primary hypertension                 Follow Up      Return in about 6 months (around 12/13/2024) for Follow with Shira Nguyễn, EKG with F/U.              Patient was given instructions and counseling regarding her condition or for health maintenance advice. Please see specific information pulled into the AVS if appropriate.   "

## 2024-06-24 ENCOUNTER — OFFICE VISIT (OUTPATIENT)
Dept: FAMILY MEDICINE CLINIC | Facility: CLINIC | Age: 74
End: 2024-06-24
Payer: MEDICARE

## 2024-06-24 VITALS
DIASTOLIC BLOOD PRESSURE: 81 MMHG | BODY MASS INDEX: 29.67 KG/M2 | WEIGHT: 173.8 LBS | SYSTOLIC BLOOD PRESSURE: 149 MMHG | OXYGEN SATURATION: 98 % | HEIGHT: 64 IN | TEMPERATURE: 97.8 F | HEART RATE: 77 BPM

## 2024-06-24 DIAGNOSIS — D50.8 OTHER IRON DEFICIENCY ANEMIA: ICD-10-CM

## 2024-06-24 DIAGNOSIS — E78.49 OTHER HYPERLIPIDEMIA: ICD-10-CM

## 2024-06-24 DIAGNOSIS — Z12.31 VISIT FOR SCREENING MAMMOGRAM: ICD-10-CM

## 2024-06-24 DIAGNOSIS — R00.2 HEART PALPITATIONS: Primary | ICD-10-CM

## 2024-06-24 LAB
ALBUMIN SERPL-MCNC: 4.4 G/DL (ref 3.5–5.2)
ALBUMIN/GLOB SERPL: 1.6 G/DL
ALP SERPL-CCNC: 84 U/L (ref 39–117)
ALT SERPL W P-5'-P-CCNC: 8 U/L (ref 1–33)
ANION GAP SERPL CALCULATED.3IONS-SCNC: 11.9 MMOL/L (ref 5–15)
AST SERPL-CCNC: 19 U/L (ref 1–32)
BASOPHILS # BLD AUTO: 0.03 10*3/MM3 (ref 0–0.2)
BASOPHILS NFR BLD AUTO: 0.8 % (ref 0–1.5)
BILIRUB SERPL-MCNC: 0.3 MG/DL (ref 0–1.2)
BUN SERPL-MCNC: 9 MG/DL (ref 8–23)
BUN/CREAT SERPL: 14.5 (ref 7–25)
CALCIUM SPEC-SCNC: 9.5 MG/DL (ref 8.6–10.5)
CHLORIDE SERPL-SCNC: 104 MMOL/L (ref 98–107)
CHOLEST SERPL-MCNC: 197 MG/DL (ref 0–200)
CO2 SERPL-SCNC: 25.1 MMOL/L (ref 22–29)
CREAT SERPL-MCNC: 0.62 MG/DL (ref 0.57–1)
DEPRECATED RDW RBC AUTO: 42 FL (ref 37–54)
EGFRCR SERPLBLD CKD-EPI 2021: 93.6 ML/MIN/1.73
EOSINOPHIL # BLD AUTO: 0.18 10*3/MM3 (ref 0–0.4)
EOSINOPHIL NFR BLD AUTO: 4.5 % (ref 0.3–6.2)
ERYTHROCYTE [DISTWIDTH] IN BLOOD BY AUTOMATED COUNT: 13 % (ref 12.3–15.4)
FERRITIN SERPL-MCNC: 77.4 NG/ML (ref 13–150)
FOLATE SERPL-MCNC: 10.7 NG/ML (ref 4.78–24.2)
GLOBULIN UR ELPH-MCNC: 2.8 GM/DL
GLUCOSE SERPL-MCNC: 89 MG/DL (ref 65–99)
HCT VFR BLD AUTO: 42.3 % (ref 34–46.6)
HDLC SERPL-MCNC: 80 MG/DL (ref 40–60)
HGB BLD-MCNC: 13.5 G/DL (ref 12–15.9)
IMM GRANULOCYTES # BLD AUTO: 0.01 10*3/MM3 (ref 0–0.05)
IMM GRANULOCYTES NFR BLD AUTO: 0.3 % (ref 0–0.5)
IRON 24H UR-MRATE: 50 MCG/DL (ref 37–145)
LDLC SERPL CALC-MCNC: 97 MG/DL (ref 0–100)
LDLC/HDLC SERPL: 1.17 {RATIO}
LYMPHOCYTES # BLD AUTO: 0.85 10*3/MM3 (ref 0.7–3.1)
LYMPHOCYTES NFR BLD AUTO: 21.4 % (ref 19.6–45.3)
MCH RBC QN AUTO: 28.2 PG (ref 26.6–33)
MCHC RBC AUTO-ENTMCNC: 31.9 G/DL (ref 31.5–35.7)
MCV RBC AUTO: 88.5 FL (ref 79–97)
MONOCYTES # BLD AUTO: 0.3 10*3/MM3 (ref 0.1–0.9)
MONOCYTES NFR BLD AUTO: 7.6 % (ref 5–12)
NEUTROPHILS NFR BLD AUTO: 2.6 10*3/MM3 (ref 1.7–7)
NEUTROPHILS NFR BLD AUTO: 65.4 % (ref 42.7–76)
NRBC BLD AUTO-RTO: 0 /100 WBC (ref 0–0.2)
PLATELET # BLD AUTO: 168 10*3/MM3 (ref 140–450)
PMV BLD AUTO: 11.6 FL (ref 6–12)
POTASSIUM SERPL-SCNC: 4.4 MMOL/L (ref 3.5–5.2)
PROT SERPL-MCNC: 7.2 G/DL (ref 6–8.5)
RBC # BLD AUTO: 4.78 10*6/MM3 (ref 3.77–5.28)
RETICS # AUTO: 0.07 10*6/MM3 (ref 0.02–0.13)
RETICS/RBC NFR AUTO: 1.41 % (ref 0.7–1.9)
SODIUM SERPL-SCNC: 141 MMOL/L (ref 136–145)
TRIGL SERPL-MCNC: 117 MG/DL (ref 0–150)
VIT B12 BLD-MCNC: 366 PG/ML (ref 211–946)
VLDLC SERPL-MCNC: 20 MG/DL (ref 5–40)
WBC NRBC COR # BLD AUTO: 3.97 10*3/MM3 (ref 3.4–10.8)

## 2024-06-24 PROCEDURE — 1160F RVW MEDS BY RX/DR IN RCRD: CPT | Performed by: NURSE PRACTITIONER

## 2024-06-24 PROCEDURE — 99214 OFFICE O/P EST MOD 30 MIN: CPT | Performed by: NURSE PRACTITIONER

## 2024-06-24 PROCEDURE — 36415 COLL VENOUS BLD VENIPUNCTURE: CPT | Performed by: NURSE PRACTITIONER

## 2024-06-24 PROCEDURE — 85025 COMPLETE CBC W/AUTO DIFF WBC: CPT | Performed by: NURSE PRACTITIONER

## 2024-06-24 PROCEDURE — 82607 VITAMIN B-12: CPT | Performed by: NURSE PRACTITIONER

## 2024-06-24 PROCEDURE — 1125F AMNT PAIN NOTED PAIN PRSNT: CPT | Performed by: NURSE PRACTITIONER

## 2024-06-24 PROCEDURE — 80053 COMPREHEN METABOLIC PANEL: CPT | Performed by: NURSE PRACTITIONER

## 2024-06-24 PROCEDURE — 82746 ASSAY OF FOLIC ACID SERUM: CPT | Performed by: NURSE PRACTITIONER

## 2024-06-24 PROCEDURE — 80061 LIPID PANEL: CPT | Performed by: NURSE PRACTITIONER

## 2024-06-24 PROCEDURE — 85045 AUTOMATED RETICULOCYTE COUNT: CPT | Performed by: NURSE PRACTITIONER

## 2024-06-24 PROCEDURE — 83540 ASSAY OF IRON: CPT | Performed by: NURSE PRACTITIONER

## 2024-06-24 PROCEDURE — 3077F SYST BP >= 140 MM HG: CPT | Performed by: NURSE PRACTITIONER

## 2024-06-24 PROCEDURE — 82728 ASSAY OF FERRITIN: CPT | Performed by: NURSE PRACTITIONER

## 2024-06-24 PROCEDURE — 3079F DIAST BP 80-89 MM HG: CPT | Performed by: NURSE PRACTITIONER

## 2024-06-24 PROCEDURE — 1159F MED LIST DOCD IN RCRD: CPT | Performed by: NURSE PRACTITIONER

## 2024-06-24 RX ORDER — NEBIVOLOL 2.5 MG/1
2.5 TABLET ORAL DAILY
Qty: 90 TABLET | Refills: 1 | Status: SHIPPED | OUTPATIENT
Start: 2024-06-24

## 2024-06-24 NOTE — PROGRESS NOTES
Chief Complaint  Primary hypertension and Edema (Lymph node in neck )    Subjective          Kristi Ibrahim is a 74 y.o. female who presents to [facility]    History of Present Illness    HTN: Seeing Dr Peoples last week, BP is stable  Lymph node in neck that swells intermittently, had it biopsied in the past and it was benign.    PHQ-2 Total Score: 0   PHQ-9 Total Score: 0        Review of Systems       Medical History: has a past medical history of Abnormal ECG, Allergic, Anemia, unspecified, Arthritis, Arthritis (02/14/2022), Asthma, Asthma (02/14/2022), Atrial fibrillation, Cataract, Coronary artery disease, Dental disease, Diverticulosis, Dizziness, Fibromyalgia, Fibromyalgia, primary, GERD (gastroesophageal reflux disease), H/O degenerative disc disease, Headache, Hemangioma of bone (09/10/2019), Hepatitis A, HL (hearing loss), Hypertension, Injury of back, Injury of neck, Leg pain, Leg swelling, Leg weakness, bilateral (09/10/2019), Long-term current use of opiate analgesic (07/08/2019), Low back pain (08/25/2020), Lumbar herniated disc, Lumbar stenosis (09/10/2019), Mitral valve insufficiency, Muscle cramps, Osteopenia, Other hyperlipidemia (08/01/2022), Scoliosis, Seasonal allergic rhinitis, Shortness of breath, Sinus infection (03/31/2022), Spondylolisthesis of cervical region (09/10/2019), Spondylolisthesis of lumbar region (08/25/2020), Tricuspid regurgitation, Urinary tract infection, and Voice hoarseness.     Surgical History: has a past surgical history that includes Cardiac catheterization; Coronary angioplasty; Tubal ligation; Tonsillectomy and adenoidectomy; rib biopsy, rib resection; Carpal tunnel release (Bilateral); Knee Arthroscopy (Left); Cardiac catheterization (N/A, 04/23/2018); Cardiac catheterization (N/A, 04/23/2018); Cardiac catheterization (N/A, 04/23/2018); Spinal fusion; Cholecystectomy; Colonoscopy; Hand surgery; Other surgical history; Foot surgery;  Adenoidectomy; Coronary stent placement; Breast surgery (Multiple cysts removed); and Eye surgery (Cateracts).     Family History: family history includes Arthritis in her brother, father, mother, and sister; Breast cancer in her sister; Cancer in her maternal grandmother, sister, and sister; Diabetes in her brother; Heart disease in her brother, father, and mother; Hypertension in her brother, brother, and father; Osteoarthritis in her mother; Rheum arthritis in her father; Stomach cancer in her father; Stroke in her mother.     Social History: reports that she has never smoked. She has never used smokeless tobacco. She reports that she does not drink alcohol and does not use drugs.    Allergies: Nickel, Rapeseed [brassica napus seed oil], Ace inhibitors, Acetaminophen, Brompheniramine, Codeine, Diclofenac, Flurbiprofen, Hydrocodone, Ibuprofen, Indocyanine green, Pantoprazole, Propoxyphene, Statins, Sulfamethoxazole, Tramadol, Trimethoprim, Vegetable oil, and Iodinated contrast media      Health Maintenance Due   Topic Date Due    TDAP/TD VACCINES (1 - Tdap) Never done    ZOSTER VACCINE (1 of 2) Never done    RSV Vaccine - Adults (1 - 1-dose 60+ series) Never done    HEPATITIS C SCREENING  Never done    ANNUAL WELLNESS VISIT  Never done    PAP SMEAR  Never done    Pneumococcal Vaccine 65+ (2 of 2 - PPSV23 or PCV20) 11/16/2017    COVID-19 Vaccine (5 - 2023-24 season) 09/01/2023    DXA SCAN  03/26/2024            Current Outpatient Medications:     albuterol sulfate  (90 Base) MCG/ACT inhaler, Inhale 2 puffs Every 4 (Four) Hours As Needed for Wheezing., Disp: 18 g, Rfl: 11    aspirin 81 MG tablet, Take 1 tablet by mouth Every Other Day., Disp: , Rfl:     baclofen (LIORESAL) 10 MG tablet, Take 1 tablet by mouth 3 (Three) Times a Day., Disp: , Rfl:     carbidopa-levodopa (SINEMET)  MG per tablet, Take 1.5 tablets by mouth 3 (Three) Times a Day., Disp: , Rfl:     CITRUS BERGAMOT PO, Take 500 mg by mouth 2  (Two) Times a Day., Disp: , Rfl:     estradiol (ESTRACE) 0.1 MG/GM vaginal cream, Insert 2 g into the vagina 2 (Two) Times a Week., Disp: , Rfl:     hydroCHLOROthiazide (MICROZIDE) 12.5 MG capsule, Take 1 capsule by mouth Daily., Disp: 30 capsule, Rfl: 11    lidocaine (LIDODERM) 5 %, , Disp: , Rfl:     magnesium oxide (MAG-OX) 400 MG tablet, Take 1 tablet by mouth 2 (Two) Times a Day., Disp: , Rfl:     meclizine (ANTIVERT) 25 MG tablet, Take 1 tablet by mouth Daily., Disp: , Rfl:     Multiple Vitamins-Minerals (HAIR SKIN AND NAILS FORMULA PO), Take 1 tablet by mouth Daily., Disp: , Rfl:     nebivolol (BYSTOLIC) 2.5 MG tablet, Take 1 tablet by mouth Daily., Disp: 90 tablet, Rfl: 1    nitroglycerin (NITROSTAT) 0.4 MG SL tablet, Place 1 tablet under the tongue Every 5 (Five) Minutes As Needed for Chest Pain., Disp: 25 tablet, Rfl: 5    Omega-3 Fatty Acids (fish oil) 1000 MG capsule capsule, Take 1 capsule by mouth 2 (Two) Times a Day With Meals., Disp: 180 capsule, Rfl: 3    ondansetron (ZOFRAN) 4 MG tablet, , Disp: , Rfl:     Probiotic Product (SOLUBLE FIBER/PROBIOTICS PO), Take 1 tablet by mouth Daily., Disp: , Rfl:     rOPINIRole (Requip) 1 MG tablet, Take 1-2 tablets 1 hour before bedtime for restless legs., Disp: 60 tablet, Rfl: 5    SUMAtriptan (IMITREX) 50 MG tablet, TAKE 1 TABLET AT ONSET OF HEADACHE ONE TIME AS NEEDED FOR MIGRAINE FOR UP TO 1 DOSE. MAY REPEAT DOSE 1 TIME., Disp: 9 tablet, Rfl: 5    Vibegron 75 MG tablet, Take  by mouth., Disp: , Rfl:       Immunization History   Administered Date(s) Administered    COVID-19 (MODERNA) 1st,2nd,3rd Dose Monovalent 02/02/2021, 03/04/2021, 11/05/2021    Covid-19 (Pfizer) Gray Cap Monovalent 10/26/2022    Fluad Quad 65+ 11/08/2022, 10/25/2023    Fluzone High Dose =>65 Years ([employer] ONLY) 10/16/2019    Fluzone High-Dose 65+yrs 09/02/2020, 10/08/2021, 10/19/2022    Influenza, Unspecified 09/02/2020, 10/19/2022    Pneumococcal Conjugate 13-Valent (PCV13) 09/21/2017,  "09/21/2017         Objective       Vitals:    06/24/24 1355   BP: 149/81   BP Location: Right arm   Patient Position: Sitting   Cuff Size: Adult   Pulse: 77   Temp: 97.8 °F (36.6 °C)   TempSrc: Temporal   SpO2: 98%   Weight: 78.8 kg (173 lb 12.8 oz)   Height: 161.3 cm (63.5\")   PainSc:   2   PainLoc: Generalized      Body mass index is 30.3 kg/m².   Wt Readings from Last 3 Encounters:   06/24/24 78.8 kg (173 lb 12.8 oz)   06/13/24 78.9 kg (174 lb)   12/22/23 77.1 kg (170 lb)      BP Readings from Last 3 Encounters:   06/24/24 149/81   06/13/24 134/73   12/22/23 122/80                Physical Exam  Vitals reviewed.   Constitutional:       Appearance: Normal appearance.   Cardiovascular:      Rate and Rhythm: Normal rate and regular rhythm.      Pulses: Normal pulses.      Heart sounds: Normal heart sounds.   Pulmonary:      Effort: Pulmonary effort is normal.      Breath sounds: Normal breath sounds.   Skin:     General: Skin is warm and dry.   Neurological:      Mental Status: She is alert and oriented to person, place, and time.   Psychiatric:         Mood and Affect: Mood normal.         Behavior: Behavior normal.         Thought Content: Thought content normal.         Judgment: Judgment normal.             Result Review :       Common labs          9/12/2023    14:52 9/18/2023    06:46 12/22/2023    15:29   Common Labs   Glucose   87    Glucose PLEASE DISREGARD RESULTS         BUN PLEASE DISREGARD RESULTS      13    Creatinine PLEASE DISREGARD RESULTS      0.65    Sodium PLEASE DISREGARD RESULTS, PATIENT TO BE RECOLLECTED ASAP      140    Potassium PLEASE DISREGARD RESULTS      4.0    Chloride PLEASE DISREGARD RESULTS      100    Calcium PLEASE DISREGARD RESULTS      9.8    Albumin   4.2    Total Bilirubin   0.5    Alkaline Phosphatase   94    AST (SGOT)   25    ALT (SGPT)   10    WBC PLEASE DISREGARD ALL RESULTS, PATIENT TO BE RECOLLECT ASAP     4.49        Hemoglobin PLEASE DISREGARD RESULTS     12.5      "   Hematocrit PLEASE DISREGARD RESULTS     38.7        Platelets PLEASE IDREGARD RESULTS     142        Total Cholesterol   229    Triglycerides   93    HDL Cholesterol   92    LDL Cholesterol    121       Details          This result is from an external source.                              Assessment and Plan        Diagnoses and all orders for this visit:    1. Heart palpitations (Primary)  Comments:  Doing well on Bystolic  Orders:  -     nebivolol (BYSTOLIC) 2.5 MG tablet; Take 1 tablet by mouth Daily.  Dispense: 90 tablet; Refill: 1    2. Visit for screening mammogram  -     Mammo Screening Digital Tomosynthesis Bilateral With CAD; Future    3. Other iron deficiency anemia  -     Comprehensive Metabolic Panel  -     Iron  -     Vitamin B12 and Folate  -     Ferritin  -     CBC and Differential  -     Reticulocytes    4. Other hyperlipidemia  -     Lipid Panel          Follow Up     Return in about 6 months (around 12/24/2024).    Patient was given instructions and counseling regarding her condition or for health maintenance advice. Please see specific information pulled into the AVS if appropriate. Patient understands the importance of having any ordered tests to be performed in a timely fashion.    I spent 15 minutes caring for Kristi on this date of service. This time includes time spent by me in the following activities: preparing for the visit, reviewing tests, performing a medically appropriate examination and/or evaluation, ordering medications, and ordering test(s)      BASIM Kinney    Answers submitted by the patient for this visit:  Other (Submitted on 6/17/2024)  Please describe your symptoms.: Follow up and bloodwork  Have you had these symptoms before?: Yes  How long have you been having these symptoms?: 1-4 days  Please list any medications you are currently taking for this condition.: NA  Please describe any probable cause for these symptoms. : NA  Primary Reason for Visit (Submitted on  6/17/2024)  What is the primary reason for your visit?: Other

## 2024-08-26 ENCOUNTER — OFFICE (AMBULATORY)
Age: 74
End: 2024-08-26
Payer: COMMERCIAL

## 2024-08-26 ENCOUNTER — OFFICE (AMBULATORY)
Dept: URBAN - METROPOLITAN AREA CLINIC 76 | Facility: CLINIC | Age: 74
End: 2024-08-26
Payer: COMMERCIAL

## 2024-08-26 VITALS
SYSTOLIC BLOOD PRESSURE: 140 MMHG | HEART RATE: 78 BPM | WEIGHT: 174 LBS | HEIGHT: 63 IN | DIASTOLIC BLOOD PRESSURE: 86 MMHG | DIASTOLIC BLOOD PRESSURE: 86 MMHG | SYSTOLIC BLOOD PRESSURE: 140 MMHG | SYSTOLIC BLOOD PRESSURE: 140 MMHG | HEART RATE: 78 BPM | HEART RATE: 78 BPM | HEART RATE: 78 BPM | DIASTOLIC BLOOD PRESSURE: 86 MMHG | HEART RATE: 78 BPM | WEIGHT: 174 LBS | HEART RATE: 78 BPM | DIASTOLIC BLOOD PRESSURE: 86 MMHG | SYSTOLIC BLOOD PRESSURE: 140 MMHG | WEIGHT: 174 LBS | WEIGHT: 174 LBS | WEIGHT: 174 LBS | DIASTOLIC BLOOD PRESSURE: 86 MMHG | WEIGHT: 174 LBS | DIASTOLIC BLOOD PRESSURE: 86 MMHG | HEIGHT: 63 IN | WEIGHT: 174 LBS | SYSTOLIC BLOOD PRESSURE: 140 MMHG | HEIGHT: 63 IN | HEIGHT: 63 IN | SYSTOLIC BLOOD PRESSURE: 140 MMHG | HEART RATE: 78 BPM | HEIGHT: 63 IN | DIASTOLIC BLOOD PRESSURE: 86 MMHG | HEIGHT: 63 IN | SYSTOLIC BLOOD PRESSURE: 140 MMHG | HEIGHT: 63 IN

## 2024-08-26 DIAGNOSIS — A04.9 BACTERIAL INTESTINAL INFECTION, UNSPECIFIED: ICD-10-CM

## 2024-08-26 DIAGNOSIS — K30 FUNCTIONAL DYSPEPSIA: ICD-10-CM

## 2024-08-26 DIAGNOSIS — R19.7 DIARRHEA, UNSPECIFIED: ICD-10-CM

## 2024-08-26 PROCEDURE — 99214 OFFICE O/P EST MOD 30 MIN: CPT

## 2024-08-26 RX ORDER — FAMOTIDINE 40 MG/1
40 TABLET, FILM COATED ORAL
Qty: 90 | Refills: 3 | Status: ACTIVE
Start: 2024-08-26

## 2024-09-16 ENCOUNTER — HOSPITAL ENCOUNTER (OUTPATIENT)
Dept: MAMMOGRAPHY | Facility: HOSPITAL | Age: 74
Discharge: HOME | End: 2024-09-16
Admitting: NURSE PRACTITIONER
Payer: MEDICARE

## 2024-09-16 DIAGNOSIS — Z12.31 VISIT FOR SCREENING MAMMOGRAM: ICD-10-CM

## 2024-09-16 PROCEDURE — 77063 BREAST TOMOSYNTHESIS BI: CPT

## 2024-09-16 PROCEDURE — 77067 SCR MAMMO BI INCL CAD: CPT

## 2024-10-10 NOTE — PROGRESS NOTES
Subjective   The ABCs of the Annual Wellness Visit  Medicare Wellness Visit      Kristi Ibrahim is a 74 y.o. patient who presents for a Medicare Wellness Visit.    The following portions of the patient's history were reviewed and   updated as appropriate: allergies, current medications, past family history, past medical history, past social history, past surgical history, and problem list.    Compared to one year ago, the patient's physical   health is better.  Compared to one year ago, the patient's mental   health is better.    Recent Hospitalizations:  She was not admitted to the hospital during the last year.     Current Medical Providers:  Patient Care Team:  Pamela Wyman APRN as PCP - General (Nurse Practitioner)    Outpatient Medications Prior to Visit   Medication Sig Dispense Refill    albuterol sulfate  (90 Base) MCG/ACT inhaler Inhale 2 puffs Every 4 (Four) Hours As Needed for Wheezing. 18 g 11    aspirin 81 MG tablet Take 1 tablet by mouth Every Other Day.      baclofen (LIORESAL) 10 MG tablet Take 1 tablet by mouth 3 (Three) Times a Day.      carbidopa-levodopa (SINEMET)  MG per tablet Take 1.5 tablets by mouth 3 (Three) Times a Day.      CITRUS BERGAMOT PO Take 500 mg by mouth 2 (Two) Times a Day.      estradiol (ESTRACE) 0.1 MG/GM vaginal cream Insert 2 g into the vagina 2 (Two) Times a Week.      famotidine (PEPCID) 40 MG tablet       hydroCHLOROthiazide (MICROZIDE) 12.5 MG capsule Take 1 capsule by mouth Daily. 30 capsule 11    lidocaine (LIDODERM) 5 %       magnesium oxide (MAG-OX) 400 MG tablet Take 1 tablet by mouth 2 (Two) Times a Day.      meclizine (ANTIVERT) 25 MG tablet Take 1 tablet by mouth Daily.      Multiple Vitamins-Minerals (HAIR SKIN AND NAILS FORMULA PO) Take 1 tablet by mouth Daily.      nebivolol (BYSTOLIC) 2.5 MG tablet Take 1 tablet by mouth Daily. 90 tablet 1    Omega-3 Fatty Acids (fish oil) 1000 MG capsule capsule Take 1 capsule by mouth 2 (Two) Times a Day  With Meals. 180 capsule 3    ondansetron (ZOFRAN) 4 MG tablet       Probiotic Product (SOLUBLE FIBER/PROBIOTICS PO) Take 1 tablet by mouth Daily.      SUMAtriptan (IMITREX) 50 MG tablet TAKE 1 TABLET AT ONSET OF HEADACHE ONE TIME AS NEEDED FOR MIGRAINE FOR UP TO 1 DOSE. MAY REPEAT DOSE 1 TIME. 9 tablet 5    Vibegron 75 MG tablet Take  by mouth.      nitroglycerin (NITROSTAT) 0.4 MG SL tablet Place 1 tablet under the tongue Every 5 (Five) Minutes As Needed for Chest Pain. 25 tablet 5    rOPINIRole (Requip) 1 MG tablet Take 1-2 tablets 1 hour before bedtime for restless legs. 60 tablet 5     No facility-administered medications prior to visit.     No opioid medication identified on active medication list. I have reviewed chart for other potential  high risk medication/s and harmful drug interactions in the elderly.      Aspirin is on active medication list. Aspirin use is not indicated based on review of current medical condition/s. Risk of harm outweighs potential benefits. Patient instructed to discontinue this medication.  .      Patient Active Problem List   Diagnosis    Primary hypertension    CAD S/P percutaneous coronary angioplasty    Anemia, unspecified    Arthritis    Asthma    Degeneration of lumbar intervertebral disc    Left lumbar radiculopathy    Lumbar spondylosis    Dyskinesia    Dystonia    GERD (gastroesophageal reflux disease)    Hemangioma    Hepatitis A    Parkinson disease    Seasonal allergic rhinitis    Spondylolisthesis of cervical region    Migraine with aura and without status migrainosus, not intractable    Postmenopausal    Other hyperlipidemia    Dysuria    Alopecia    Restless leg syndrome     Advance Care Planning Advance Directive is on file.  ACP discussion was held with the patient during this visit. Patient has an advance directive in EMR which is still valid.             Objective   Vitals:    10/21/24 1403   BP: 139/85   Pulse: 91   SpO2: 98%   Weight: 81.6 kg (180 lb)   Height:  "161.3 cm (63.5\")   PainSc:   4   PainLoc: Back       Estimated body mass index is 31.39 kg/m² as calculated from the following:    Height as of this encounter: 161.3 cm (63.5\").    Weight as of this encounter: 81.6 kg (180 lb).    BMI is >= 30 and <35. (Class 1 Obesity). The following options were offered after discussion;: weight loss educational material (shared in after visit summary)       Does the patient have evidence of cognitive impairment? No                                                                                                Health  Risk Assessment    Smoking Status:  Social History     Tobacco Use   Smoking Status Never   Smokeless Tobacco Never     Alcohol Consumption:  Social History     Substance and Sexual Activity   Alcohol Use Never       Fall Risk Screen  STEADI Fall Risk Assessment was completed, and patient is at LOW risk for falls.Assessment completed on:10/21/2024    Depression Screening:      10/21/2024     2:04 PM   PHQ-2/PHQ-9 Depression Screening   Little interest or pleasure in doing things Not at all   Feeling down, depressed, or hopeless Not at all     Health Habits and Functional and Cognitive Screening:      10/20/2024     2:59 PM   Functional & Cognitive Status   Do you have difficulty preparing food and eating? No    Do you have difficulty bathing yourself, getting dressed or grooming yourself? No    Do you have difficulty using the toilet? No    Do you have difficulty moving around from place to place? No    Do you have trouble with steps or getting out of a bed or a chair? No    Current Diet Well Balanced Diet    Dental Exam Up to date    Eye Exam Up to date    Exercise (times per week) 10 times per week    Current Exercises Include Bicycling Outdoors    Do you need help using the phone?  No    Are you deaf or do you have serious difficulty hearing?  No    Do you need help to go to places out of walking distance? No    Do you need help shopping? No    Do you need help " preparing meals?  No    Do you need help with housework?  No    Do you need help with laundry? No    Do you need help taking your medications? No    Do you need help managing money? No    Do you ever drive or ride in a car without wearing a seat belt? No    Have you felt unusual stress, anger or loneliness in the last month? No    Who do you live with? Spouse    If you need help, do you have trouble finding someone available to you? No    Have you been bothered in the last four weeks by sexual problems? No    Do you have difficulty concentrating, remembering or making decisions? No        Patient-reported           Age-appropriate Screening Schedule:  Refer to the list below for future screening recommendations based on patient's age, sex and/or medical conditions. Orders for these recommended tests are listed in the plan section. The patient has been provided with a written plan.    Health Maintenance List  Health Maintenance   Topic Date Due    TDAP/TD VACCINES (1 - Tdap) Never done    ZOSTER VACCINE (1 of 2) Never done    HEPATITIS C SCREENING  Never done    PAP SMEAR  Never done    Pneumococcal Vaccine 65+ (2 of 2 - PPSV23 or PCV20) 11/16/2017    DXA SCAN  03/26/2024    COVID-19 Vaccine (5 - 2023-24 season) 10/23/2024 (Originally 9/1/2024)    LIPID PANEL  06/24/2025    ANNUAL WELLNESS VISIT  10/21/2025    BMI FOLLOWUP  10/21/2025    MAMMOGRAM  09/16/2026    COLORECTAL CANCER SCREENING  01/11/2033    INFLUENZA VACCINE  Completed                                                                                                                                                CMS Preventative Services Quick Reference  Risk Factors Identified During Encounter  None Identified    The above risks/problems have been discussed with the patient.  Pertinent information has been shared with the patient in the After Visit Summary.  An After Visit Summary and PPPS were made available to the patient.    Follow Up:   Next Medicare  "Wellness visit to be scheduled in 1 year.          Additional E&M Note during same encounter follows:  Patient has multiple medical problems which are significant and separately identifiable that require additional work above and beyond the Medicare Wellness Visit.      Chief Complaint  Medicare Wellness-subsequent    rKisti Ibrahim is a 74 y.o. female who presents to [facility]       Objective   Vital Signs:   Vitals:    10/21/24 1403   BP: 139/85   Pulse: 91   SpO2: 98%   Weight: 81.6 kg (180 lb)   Height: 161.3 cm (63.5\")   PainSc:   4   PainLoc: Back       Wt Readings from Last 3 Encounters:   10/21/24 81.6 kg (180 lb)   06/24/24 78.8 kg (173 lb 12.8 oz)   06/13/24 78.9 kg (174 lb)     BP Readings from Last 3 Encounters:   10/21/24 139/85   06/24/24 149/81   06/13/24 134/73       Physical Exam  Vitals reviewed.   Constitutional:       Appearance: Normal appearance.   HENT:      Head: Normocephalic and atraumatic.      Right Ear: Tympanic membrane is erythematous.      Left Ear: Tympanic membrane is erythematous.      Nose: Nasal tenderness, mucosal edema, congestion and rhinorrhea present.      Right Sinus: Maxillary sinus tenderness present.      Left Sinus: Maxillary sinus tenderness present.      Mouth/Throat:      Pharynx: Posterior oropharyngeal erythema and postnasal drip present.   Cardiovascular:      Rate and Rhythm: Normal rate and regular rhythm.      Pulses: Normal pulses.      Heart sounds: Normal heart sounds.   Pulmonary:      Effort: Pulmonary effort is normal.      Breath sounds: Normal breath sounds.   Skin:     General: Skin is warm and dry.   Neurological:      Mental Status: She is alert and oriented to person, place, and time.   Psychiatric:         Mood and Affect: Mood normal.         Behavior: Behavior normal.         Thought Content: Thought content normal.         Judgment: Judgment normal.         The following data was reviewed by Pamela Wyman, " APRN on 10/21/2024  Common Labs   Common labs          12/22/2023    15:29 6/24/2024    14:46   Common Labs   Glucose 87  89    BUN 13  9    Creatinine 0.65  0.62    Sodium 140  141    Potassium 4.0  4.4    Chloride 100  104    Calcium 9.8  9.5    Albumin 4.2  4.4    Total Bilirubin 0.5  0.3    Alkaline Phosphatase 94  84    AST (SGOT) 25  19    ALT (SGPT) 10  8    WBC  3.97    Hemoglobin  13.5    Hematocrit  42.3    Platelets  168    Total Cholesterol 229  197    Triglycerides 93  117    HDL Cholesterol 92  80    LDL Cholesterol  121  97        Assessment & Plan   Diagnoses and all orders for this visit:    1. Encounter for general adult medical examination with abnormal findings (Primary)    2. Acute non-recurrent maxillary sinusitis  -     amoxicillin (AMOXIL) 500 MG tablet; Take 1 tablet by mouth 2 (Two) Times a Day for 10 days.  Dispense: 20 tablet; Refill: 0    3. Parkinson's disease with dyskinesia and fluctuating manifestations  -     Ambulatory Referral to Physical Therapy for Evaluation & Treatment  -     CBC (No Diff); Future  -     Comprehensive Metabolic Panel; Future    4. Restless leg syndrome  Comments:  Trying to wean off as she tolerates  Orders:  -     rOPINIRole (REQUIP) 0.5 MG tablet; Take 1-2 tablets by mouth Every Night. Take 1 hour before bedtime.  Dispense: 180 tablet; Refill: 1    5. Chest pain, unspecified type  Comments:  Refill Nitro Pills  Orders:  -     nitroglycerin (NITROSTAT) 0.4 MG SL tablet; Place 1 tablet under the tongue Every 5 (Five) Minutes As Needed for Chest Pain.  Dispense: 25 tablet; Refill: 5    6. Need for hepatitis C screening test  -     Hepatitis C Antibody; Future        BMI is >= 30 and <35. (Class 1 Obesity). The following options were offered after discussion;: weight loss educational material (shared in after visit summary)       FOLLOW UP    Return in about 6 months (around 4/21/2025).    Patient was given instructions and counseling regarding her condition or  for health maintenance advice. Please see specific information pulled into the AVS if appropriate.     Pamela Wyman, BASIM  10/21/24  15:27 EDT

## 2024-10-21 ENCOUNTER — OFFICE VISIT (OUTPATIENT)
Dept: FAMILY MEDICINE CLINIC | Facility: CLINIC | Age: 74
End: 2024-10-21
Payer: MEDICARE

## 2024-10-21 VITALS
OXYGEN SATURATION: 98 % | HEART RATE: 91 BPM | SYSTOLIC BLOOD PRESSURE: 139 MMHG | BODY MASS INDEX: 30.73 KG/M2 | DIASTOLIC BLOOD PRESSURE: 85 MMHG | HEIGHT: 64 IN | WEIGHT: 180 LBS

## 2024-10-21 DIAGNOSIS — Z00.01 ENCOUNTER FOR GENERAL ADULT MEDICAL EXAMINATION WITH ABNORMAL FINDINGS: Primary | ICD-10-CM

## 2024-10-21 DIAGNOSIS — Z11.59 NEED FOR HEPATITIS C SCREENING TEST: ICD-10-CM

## 2024-10-21 DIAGNOSIS — J01.00 ACUTE NON-RECURRENT MAXILLARY SINUSITIS: ICD-10-CM

## 2024-10-21 DIAGNOSIS — R07.9 CHEST PAIN, UNSPECIFIED TYPE: ICD-10-CM

## 2024-10-21 DIAGNOSIS — G25.81 RESTLESS LEG SYNDROME: ICD-10-CM

## 2024-10-21 DIAGNOSIS — G20.B2 PARKINSON'S DISEASE WITH DYSKINESIA AND FLUCTUATING MANIFESTATIONS: ICD-10-CM

## 2024-10-21 PROCEDURE — 1170F FXNL STATUS ASSESSED: CPT | Performed by: NURSE PRACTITIONER

## 2024-10-21 PROCEDURE — 99214 OFFICE O/P EST MOD 30 MIN: CPT | Performed by: NURSE PRACTITIONER

## 2024-10-21 PROCEDURE — G0439 PPPS, SUBSEQ VISIT: HCPCS | Performed by: NURSE PRACTITIONER

## 2024-10-21 PROCEDURE — 3079F DIAST BP 80-89 MM HG: CPT | Performed by: NURSE PRACTITIONER

## 2024-10-21 PROCEDURE — 1159F MED LIST DOCD IN RCRD: CPT | Performed by: NURSE PRACTITIONER

## 2024-10-21 PROCEDURE — 1160F RVW MEDS BY RX/DR IN RCRD: CPT | Performed by: NURSE PRACTITIONER

## 2024-10-21 PROCEDURE — 1125F AMNT PAIN NOTED PAIN PRSNT: CPT | Performed by: NURSE PRACTITIONER

## 2024-10-21 PROCEDURE — 3075F SYST BP GE 130 - 139MM HG: CPT | Performed by: NURSE PRACTITIONER

## 2024-10-21 RX ORDER — NITROGLYCERIN 0.4 MG/1
0.4 TABLET SUBLINGUAL
Qty: 25 TABLET | Refills: 5 | Status: SHIPPED | OUTPATIENT
Start: 2024-10-21

## 2024-10-21 RX ORDER — ROPINIROLE 0.5 MG/1
.5-1 TABLET, FILM COATED ORAL NIGHTLY
Qty: 180 TABLET | Refills: 1 | Status: SHIPPED | OUTPATIENT
Start: 2024-10-21

## 2024-10-21 RX ORDER — FAMOTIDINE 40 MG/1
TABLET, FILM COATED ORAL
COMMUNITY
Start: 2024-08-26

## 2024-10-21 RX ORDER — AMOXICILLIN 500 MG/1
500 TABLET, FILM COATED ORAL 2 TIMES DAILY
Qty: 20 TABLET | Refills: 0 | Status: SHIPPED | OUTPATIENT
Start: 2024-10-21 | End: 2024-10-31

## 2024-11-19 ENCOUNTER — LAB (OUTPATIENT)
Dept: LAB | Facility: HOSPITAL | Age: 74
End: 2024-11-19
Payer: MEDICARE

## 2024-11-19 DIAGNOSIS — E78.49 OTHER HYPERLIPIDEMIA: ICD-10-CM

## 2024-11-19 DIAGNOSIS — Z11.59 NEED FOR HEPATITIS C SCREENING TEST: ICD-10-CM

## 2024-11-19 DIAGNOSIS — G20.B2 PARKINSON'S DISEASE WITH DYSKINESIA AND FLUCTUATING MANIFESTATIONS: ICD-10-CM

## 2024-11-19 LAB
ALBUMIN SERPL-MCNC: 4 G/DL (ref 3.5–5.2)
ALBUMIN/GLOB SERPL: 1.3 G/DL
ALP SERPL-CCNC: 82 U/L (ref 39–117)
ALT SERPL W P-5'-P-CCNC: 8 U/L (ref 1–33)
ANION GAP SERPL CALCULATED.3IONS-SCNC: 10.9 MMOL/L (ref 5–15)
AST SERPL-CCNC: 21 U/L (ref 1–32)
BILIRUB SERPL-MCNC: 0.3 MG/DL (ref 0–1.2)
BUN SERPL-MCNC: 9 MG/DL (ref 8–23)
BUN/CREAT SERPL: 11 (ref 7–25)
CALCIUM SPEC-SCNC: 9.6 MG/DL (ref 8.6–10.5)
CHLORIDE SERPL-SCNC: 104 MMOL/L (ref 98–107)
CHOLEST SERPL-MCNC: 198 MG/DL (ref 0–200)
CO2 SERPL-SCNC: 27.1 MMOL/L (ref 22–29)
CREAT SERPL-MCNC: 0.82 MG/DL (ref 0.57–1)
DEPRECATED RDW RBC AUTO: 43.4 FL (ref 37–54)
EGFRCR SERPLBLD CKD-EPI 2021: 75.2 ML/MIN/1.73
ERYTHROCYTE [DISTWIDTH] IN BLOOD BY AUTOMATED COUNT: 13.3 % (ref 12.3–15.4)
GLOBULIN UR ELPH-MCNC: 3.2 GM/DL
GLUCOSE SERPL-MCNC: 92 MG/DL (ref 65–99)
HCT VFR BLD AUTO: 39.8 % (ref 34–46.6)
HCV AB SER QL: NORMAL
HDLC SERPL-MCNC: 72 MG/DL (ref 40–60)
HGB BLD-MCNC: 13.1 G/DL (ref 12–15.9)
LDLC SERPL CALC-MCNC: 108 MG/DL (ref 0–100)
LDLC/HDLC SERPL: 1.47 {RATIO}
MCH RBC QN AUTO: 29.4 PG (ref 26.6–33)
MCHC RBC AUTO-ENTMCNC: 32.9 G/DL (ref 31.5–35.7)
MCV RBC AUTO: 89.2 FL (ref 79–97)
PLATELET # BLD AUTO: 158 10*3/MM3 (ref 140–450)
PMV BLD AUTO: 11.4 FL (ref 6–12)
POTASSIUM SERPL-SCNC: 4.1 MMOL/L (ref 3.5–5.2)
PROT SERPL-MCNC: 7.2 G/DL (ref 6–8.5)
RBC # BLD AUTO: 4.46 10*6/MM3 (ref 3.77–5.28)
SODIUM SERPL-SCNC: 142 MMOL/L (ref 136–145)
TRIGL SERPL-MCNC: 102 MG/DL (ref 0–150)
VLDLC SERPL-MCNC: 18 MG/DL (ref 5–40)
WBC NRBC COR # BLD AUTO: 4.33 10*3/MM3 (ref 3.4–10.8)

## 2024-11-19 PROCEDURE — 80061 LIPID PANEL: CPT

## 2024-11-19 PROCEDURE — 86803 HEPATITIS C AB TEST: CPT

## 2024-11-19 PROCEDURE — 80053 COMPREHEN METABOLIC PANEL: CPT

## 2024-11-19 PROCEDURE — 36415 COLL VENOUS BLD VENIPUNCTURE: CPT

## 2024-11-19 PROCEDURE — 85027 COMPLETE CBC AUTOMATED: CPT

## 2024-11-21 ENCOUNTER — TELEPHONE (OUTPATIENT)
Dept: CARDIOLOGY | Facility: CLINIC | Age: 74
End: 2024-11-21
Payer: MEDICARE

## 2024-11-21 DIAGNOSIS — E78.49 OTHER HYPERLIPIDEMIA: Primary | ICD-10-CM

## 2024-11-21 NOTE — TELEPHONE ENCOUNTER
----- Message from Shira Nguyễn sent at 11/21/2024  3:07 AM EST -----  All labs are good except for LDL is not to goal of less than 70. She is statin intolerant. Recommend repatha 140 mg q 14 days. Repeat fasting lipid and hepatic function panel in 3 months

## 2024-12-01 DIAGNOSIS — R00.2 HEART PALPITATIONS: ICD-10-CM

## 2024-12-02 RX ORDER — NEBIVOLOL 2.5 MG/1
2.5 TABLET ORAL DAILY
Qty: 90 TABLET | Refills: 1 | Status: SHIPPED | OUTPATIENT
Start: 2024-12-02

## 2024-12-17 ENCOUNTER — OFFICE VISIT (OUTPATIENT)
Dept: CARDIOLOGY | Facility: CLINIC | Age: 74
End: 2024-12-17
Payer: MEDICARE

## 2024-12-17 VITALS
WEIGHT: 176.8 LBS | BODY MASS INDEX: 30.19 KG/M2 | SYSTOLIC BLOOD PRESSURE: 142 MMHG | HEIGHT: 64 IN | DIASTOLIC BLOOD PRESSURE: 81 MMHG | HEART RATE: 78 BPM

## 2024-12-17 DIAGNOSIS — I25.10 CAD S/P PERCUTANEOUS CORONARY ANGIOPLASTY: Primary | ICD-10-CM

## 2024-12-17 DIAGNOSIS — I10 PRIMARY HYPERTENSION: ICD-10-CM

## 2024-12-17 DIAGNOSIS — I34.0 MILD MITRAL REGURGITATION: ICD-10-CM

## 2024-12-17 DIAGNOSIS — Z98.61 CAD S/P PERCUTANEOUS CORONARY ANGIOPLASTY: Primary | ICD-10-CM

## 2024-12-17 DIAGNOSIS — E78.49 OTHER HYPERLIPIDEMIA: ICD-10-CM

## 2024-12-17 PROBLEM — R30.0 DYSURIA: Status: RESOLVED | Noted: 2022-08-01 | Resolved: 2024-12-17

## 2024-12-17 NOTE — PROGRESS NOTES
"Chief Complaint  Follow-up, Coronary Artery Disease, Hypertension, and Hyperlipidemia    Subjective            History of Present Illness  Kristi Ibrahim is a 74-year-old female patient who presents to the office today for follow-up.  She has CAD with prior stenting, hypertension, hyperlipidemia, and mild mitral regurgitation.  She checks blood pressure at home with ranges in the \"140/70's\".  When she was last here and saw Dr. Peoples he sent in a prescription for Repatha however she states that her insurance was not going to cover it and that it was going to be too expensive.  She is statin intolerant due to adverse effects of tremors.  She has been taking fish oil to lower lipid levels as well as diet adjustments.  She is compliant with all other medications.  She denies any new or worsening cardiac symptoms today.    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    Ace Inhibitors Unknown (See Comments)     NOT  SURE    Acetaminophen Swelling    Brompheniramine Unknown (See Comments)     NOT SURE    Codeine Nausea And Vomiting    Diclofenac Urinary Retention     GI PAIN - " ULCERS???    Flurbiprofen Unknown (See Comments)     NOT SURE    Hydrocodone Nausea Only and Nausea And Vomiting    Ibuprofen Unknown (See Comments)     NOT SURE    Indocyanine Green Unknown (See Comments)     NOT SURE    Pantoprazole Nausea And Vomiting    Propoxyphene Unknown (See Comments)     NOT SURE    Statins Other (See Comments)     tremors    Sulfamethoxazole Hives    Tramadol Other (See Comments)    Trimethoprim Hives    Vegetable Oil Hives    Iodinated Contrast Media Rash          SURGICALHX  Past Surgical History:   Procedure Laterality Date    ADENOIDECTOMY      BREAST SURGERY  Multiple cysts removed    CARDIAC CATHETERIZATION      CARDIAC CATHETERIZATION N/A 04/23/2018    Procedure: Left Heart Cath;  Surgeon: Malina Breen MD;  Location:  [facility];  Service: Cardiovascular    CARDIAC CATHETERIZATION N/A 04/23/2018    Procedure: Coronary angiography;  Surgeon: Malina Breen MD;  Location:  [facility];  Service: Cardiovascular    CARDIAC CATHETERIZATION N/A 04/23/2018    Procedure: Left ventriculography;  Surgeon: Malina Breen MD;  Location:  [facility];  Service: Cardiovascular    CARPAL TUNNEL RELEASE Bilateral     CHOLECYSTECTOMY      COLONOSCOPY      CORONARY ANGIOPLASTY      CORONARY STENT PLACEMENT      EYE SURGERY  Cateracts    FOOT SURGERY      HAND SURGERY      KNEE ARTHROSCOPY Left     OTHER SURGICAL HISTORY      JOINT SURGERY    RIB BIOPSY, RIB RESECTION      SPINAL FUSION      TONSILLECTOMY AND ADENOIDECTOMY      TUBAL ABDOMINAL LIGATION            SOC  Social History     Socioeconomic History    Marital status:    Tobacco Use    Smoking status: Never    Smokeless tobacco: Never   Vaping Use    Vaping status: Never Used   Substance and Sexual Activity    Alcohol use: Never    Drug use: Never    Sexual activity: Not Currently     Partners: Male     Birth control/protection: Post-menopausal          FAMHX  Family History   Problem Relation Age of Onset    Heart disease Mother     Stroke Mother     Arthritis Mother     Osteoarthritis Mother     Heart disease Father     Hypertension Father     Arthritis Father     Stomach cancer Father     Rheum arthritis Father     Heart disease Brother     Hypertension Brother     Arthritis Brother     Breast cancer Sister     Arthritis Sister     Cancer Sister         Breast    Cancer Maternal Grandmother         Lung    Cancer Sister         Breast    Diabetes Brother     Hypertension Brother           MEDSIGONLY  Current Outpatient Medications on File Prior to Visit   Medication Sig    albuterol sulfate  (90 Base) MCG/ACT inhaler Inhale 2 puffs Every 4 (Four) Hours As Needed for Wheezing.    baclofen (LIORESAL) 10 MG tablet Take 1 tablet by mouth 3 (Three) Times a Day. (Patient taking differently: Take 1 tablet by mouth As Needed.)    carbidopa-levodopa (SINEMET)  MG per tablet Take 1.5 tablets by mouth 3 (Three) Times a Day.    CITRUS BERGAMOT PO Take 500 mg by mouth 2 (Two) Times a Day.    famotidine (PEPCID) 40 MG tablet     magnesium oxide (MAG-OX) 400 MG tablet Take 1 tablet by mouth 2 (Two) Times a Day.    meclizine (ANTIVERT) 25 MG tablet Take 1 tablet by mouth Daily.    Multiple Vitamins-Minerals (HAIR SKIN AND NAILS FORMULA PO) Take 1 tablet by mouth Daily.    nebivolol (BYSTOLIC) 2.5 MG tablet TAKE 1 TABLET DAILY    nitroglycerin (NITROSTAT) 0.4 MG SL tablet Place 1 tablet under the tongue Every 5 (Five) Minutes As Needed for Chest Pain.    Omega-3 Fatty Acids (fish oil) 1000 MG capsule capsule Take 1 capsule by mouth 2 (Two) Times a Day With Meals.    ondansetron (ZOFRAN) 4 MG tablet     Probiotic Product (SOLUBLE FIBER/PROBIOTICS PO) Take 1 tablet by mouth Daily.    rOPINIRole (REQUIP) 0.5 MG tablet Take 1-2 tablets by mouth Every Night. Take 1 hour before bedtime.    SUMAtriptan (IMITREX) 50 MG tablet TAKE 1 TABLET AT ONSET OF HEADACHE ONE  "TIME AS NEEDED FOR MIGRAINE FOR UP TO 1 DOSE. MAY REPEAT DOSE 1 TIME.    Vibegron 75 MG tablet Take  by mouth.    aspirin 81 MG tablet Take 1 tablet by mouth Every Other Day. (Patient not taking: Reported on 12/17/2024)    Evolocumab (REPATHA) solution auto-injector SureClick injection Inject 1 mL under the skin into the appropriate area as directed Every 14 (Fourteen) Days. (Patient not taking: Reported on 12/17/2024)    hydroCHLOROthiazide (MICROZIDE) 12.5 MG capsule Take 1 capsule by mouth Daily. (Patient not taking: Reported on 12/17/2024)    [DISCONTINUED] estradiol (ESTRACE) 0.1 MG/GM vaginal cream Insert 2 g into the vagina 2 (Two) Times a Week.    [DISCONTINUED] lidocaine (LIDODERM) 5 %      No current facility-administered medications on file prior to visit.         Objective   /81   Pulse 78   Ht 161.3 cm (63.5\")   Wt 80.2 kg (176 lb 12.8 oz)   BMI 30.82 kg/m²       Physical Exam  HENT:      Head: Normocephalic.   Neck:      Vascular: No carotid bruit.   Cardiovascular:      Rate and Rhythm: Normal rate and regular rhythm.      Pulses: Normal pulses.      Heart sounds: Murmur heard.   Pulmonary:      Effort: Pulmonary effort is normal.      Breath sounds: Normal breath sounds.   Musculoskeletal:      Cervical back: Neck supple.      Right lower leg: No edema.      Left lower leg: No edema.   Skin:     General: Skin is dry.   Neurological:      Mental Status: She is alert and oriented to person, place, and time.   Psychiatric:         Behavior: Behavior normal.       Result Review :   The following data was reviewed by: BASIM Recinos on 12/17/2024:  proBNP   Date Value Ref Range Status   05/25/2022 1,008.0 (H) 0.0 - 900.0 pg/mL Final     CMP          11/19/2024    09:59   CMP   Glucose 92    BUN 9    Creatinine 0.82    EGFR 75.2    Sodium 142    Potassium 4.1    Chloride 104    Calcium 9.6    Total Protein 7.2    Albumin 4.0    Globulin 3.2    Total Bilirubin 0.3    Alkaline Phosphatase 82 " "   AST (SGOT) 21    ALT (SGPT) 8    Albumin/Globulin Ratio 1.3    BUN/Creatinine Ratio 11.0    Anion Gap 10.9      CBC w/diff          11/19/2024    09:59   CBC w/Diff   WBC 4.33    RBC 4.46    Hemoglobin 13.1    Hematocrit 39.8    MCV 89.2    MCH 29.4    MCHC 32.9    RDW 13.3    Platelets 158    Neutrophil Rel %    Immature Granulocyte Rel %    Lymphocyte Rel %    Monocyte Rel %    Eosinophil Rel %    Basophil Rel %       Lab Results   Component Value Date    TSH 1.530 12/07/2022      Lab Results   Component Value Date    FREET4 1.37 12/07/2022      No results found for: \"DDIMERQUANT\"  No results found for: \"MG\"   No results found for: \"DIGOXIN\"   No results found for: \"TROPONINT\"        Lipid Panel          11/19/2024    09:59   Lipid Panel   Total Cholesterol 198    Triglycerides 102    HDL Cholesterol 72    VLDL Cholesterol 18    LDL Cholesterol  108    LDL/HDL Ratio 1.47      Results for orders placed during the hospital encounter of 04/07/23    Adult Transthoracic Echo Complete W/ Cont if Necessary Per Protocol    Interpretation Summary  Normal left ventricular systolic function.  Mild MR and mild TR.           Assessment and Plan    Diagnoses and all orders for this visit:    1. CAD S/P percutaneous coronary angioplasty (Primary)  She denies any angina, continue aspirin 81 mg every other day.    2. Primary hypertension  Currently controlled and without adverse effect from medication, continue nebivolol 2.5 mg daily.  -     Basic Metabolic Panel; Future    3. Other hyperlipidemia  Last lipid panel was 11/19/2024 with  which is above goal range however she is unable to tolerate statins or for PCSK9 inhibitor.  Continue fish oil for now and repeat fasting lipid panel prior to next office visit.  -     Lipid Panel; Future  -     Hepatic Function Panel; Future    4. Mild mitral regurgitation  Asymptomatic, continue to monitor with repeat echocardiogram.          Follow Up   Return in about 6 months (around " 6/17/2025) for Follow up with Dr Peoples.    Patient was given instructions and counseling regarding her condition or for health maintenance advice. Please see specific information pulled into the AVS if appropriate.     Kristi J Patrice  reports that she has never smoked. She has never used smokeless tobacco.          Shira Nguyễn, APRN  12/17/24  14:15 EST    Dictated Utilizing Dragon Dictation

## 2025-03-04 ENCOUNTER — TELEPHONE (OUTPATIENT)
Dept: FAMILY MEDICINE CLINIC | Facility: CLINIC | Age: 75
End: 2025-03-04
Payer: MEDICARE

## 2025-03-04 DIAGNOSIS — M25.552 LEFT HIP PAIN: Primary | ICD-10-CM

## 2025-03-04 RX ORDER — SUMATRIPTAN 50 MG/1
TABLET, FILM COATED ORAL
Qty: 9 TABLET | Refills: 5 | Status: SHIPPED | OUTPATIENT
Start: 2025-03-04

## 2025-03-04 NOTE — TELEPHONE ENCOUNTER
Caller: Kristi Ibrahim    Relationship: Self    Best call back number: [phone]     Requested Prescriptions:   Requested Prescriptions     Pending Prescriptions Disp Refills    SUMAtriptan (IMITREX) 50 MG tablet 9 tablet 5     Sig: Take one tablet at onset of headache. May repeat dose one time in 2 hours if headache not relieved.        Pharm[facility] [address] AT [address] TO [facility] [phone]  - [phone]     Last office visit with prescribing clinician: 10/21/2024   Last telemedicine visit with prescribing clinician: Visit date not found   Next office visit with prescribing clinician: Visit date not found         Brenda Dalton, PCT   03/04/25 10:41 EST

## 2025-03-04 NOTE — TELEPHONE ENCOUNTER
Caller: Kristi Ibrahim    Relationship: Self    Best call back number: [phone]     What is the medical concern/diagnosis: LEFT HIP PAIN    What specialty or service is being requested: PHYSICAL THERAPY    What is the provider, practice or medical service name: [facility]    Any additional details: PATIENT HAS TRAVELED OUT OF STATE AND THE TRIP WAS REALLY HARD ON HER HIP AND SHE HAS PAIN AGAIN. PATIENT STATES WHEN SHE GETS BACK SOON SHE WILL NEED TO SEE A PHYSICAL THERAPIST QUICKLY. PLEASE PUT IN THE REFERRAL SO SHE CAN SCHEDULE AN APPOINTMENTS TO BE SEEN AS SOON AS SHE RETURNS.

## 2025-03-18 ENCOUNTER — LAB (OUTPATIENT)
Dept: LAB | Facility: HOSPITAL | Age: 75
End: 2025-03-18
Payer: MEDICARE

## 2025-03-18 DIAGNOSIS — E78.49 OTHER HYPERLIPIDEMIA: ICD-10-CM

## 2025-03-18 LAB
ALBUMIN SERPL-MCNC: 3.9 G/DL (ref 3.5–5.2)
ALP SERPL-CCNC: 75 U/L (ref 39–117)
ALT SERPL W P-5'-P-CCNC: 5 U/L (ref 1–33)
AST SERPL-CCNC: 23 U/L (ref 1–32)
BILIRUB CONJ SERPL-MCNC: 0.1 MG/DL (ref 0–0.3)
BILIRUB INDIRECT SERPL-MCNC: 0.3 MG/DL
BILIRUB SERPL-MCNC: 0.4 MG/DL (ref 0–1.2)
CHOLEST SERPL-MCNC: 185 MG/DL (ref 0–200)
HDLC SERPL-MCNC: 70 MG/DL (ref 40–60)
LDLC SERPL CALC-MCNC: 98 MG/DL (ref 0–100)
LDLC/HDLC SERPL: 1.37 {RATIO}
PROT SERPL-MCNC: 7 G/DL (ref 6–8.5)
TRIGL SERPL-MCNC: 94 MG/DL (ref 0–150)
VLDLC SERPL-MCNC: 17 MG/DL (ref 5–40)

## 2025-03-18 PROCEDURE — 80076 HEPATIC FUNCTION PANEL: CPT

## 2025-03-18 PROCEDURE — 80061 LIPID PANEL: CPT

## 2025-03-18 PROCEDURE — 36415 COLL VENOUS BLD VENIPUNCTURE: CPT

## 2025-03-18 RX ORDER — SUZETRIGINE 50 MG/1
50 TABLET, FILM COATED ORAL 2 TIMES DAILY
Qty: 60 TABLET | Refills: 1 | Status: SHIPPED | OUTPATIENT
Start: 2025-03-18

## 2025-03-19 ENCOUNTER — RESULTS FOLLOW-UP (OUTPATIENT)
Dept: CARDIOLOGY | Facility: CLINIC | Age: 75
End: 2025-03-19
Payer: MEDICARE

## 2025-03-19 NOTE — TELEPHONE ENCOUNTER
MINE patient. Patient states she will think about it and let us know. Patient states she was please with her labs.

## 2025-04-21 ENCOUNTER — OFFICE VISIT (OUTPATIENT)
Dept: FAMILY MEDICINE CLINIC | Facility: CLINIC | Age: 75
End: 2025-04-21
Payer: MEDICARE

## 2025-04-21 VITALS
HEART RATE: 84 BPM | SYSTOLIC BLOOD PRESSURE: 127 MMHG | DIASTOLIC BLOOD PRESSURE: 84 MMHG | WEIGHT: 181.2 LBS | TEMPERATURE: 97.6 F | BODY MASS INDEX: 30.93 KG/M2 | HEIGHT: 64 IN | OXYGEN SATURATION: 97 %

## 2025-04-21 DIAGNOSIS — L30.4 INTERTRIGO: Primary | ICD-10-CM

## 2025-04-21 DIAGNOSIS — E78.2 MIXED HYPERLIPIDEMIA: ICD-10-CM

## 2025-04-21 DIAGNOSIS — L01.00 IMPETIGO: ICD-10-CM

## 2025-04-21 DIAGNOSIS — G20.B2 PARKINSON'S DISEASE WITH DYSKINESIA AND FLUCTUATING MANIFESTATIONS: ICD-10-CM

## 2025-04-21 DIAGNOSIS — Z78.0 POSTMENOPAUSE: ICD-10-CM

## 2025-04-21 DIAGNOSIS — G25.81 RESTLESS LEG SYNDROME: ICD-10-CM

## 2025-04-21 RX ORDER — CEPHALEXIN 500 MG/1
500 CAPSULE ORAL 2 TIMES DAILY
Qty: 14 CAPSULE | Refills: 0 | Status: SHIPPED | OUTPATIENT
Start: 2025-04-21 | End: 2025-04-28

## 2025-04-21 RX ORDER — FLUCONAZOLE 150 MG/1
150 TABLET ORAL ONCE
Qty: 1 TABLET | Refills: 0 | Status: SHIPPED | OUTPATIENT
Start: 2025-04-21 | End: 2025-04-21

## 2025-04-30 ENCOUNTER — HOSPITAL ENCOUNTER (OUTPATIENT)
Dept: BONE DENSITY | Facility: HOSPITAL | Age: 75
Discharge: HOME | End: 2025-04-30
Admitting: STUDENT IN AN ORGANIZED HEALTH CARE EDUCATION/TRAINING PROGRAM
Payer: MEDICARE

## 2025-04-30 DIAGNOSIS — Z78.0 POSTMENOPAUSE: ICD-10-CM

## 2025-04-30 PROCEDURE — 77080 DXA BONE DENSITY AXIAL: CPT

## 2025-05-01 ENCOUNTER — TELEPHONE (OUTPATIENT)
Dept: FAMILY MEDICINE CLINIC | Facility: CLINIC | Age: 75
End: 2025-05-01
Payer: MEDICARE

## 2025-05-01 NOTE — TELEPHONE ENCOUNTER
Name: Kristi Ibrahim    Relationship: Self    Best Callback Number: [phone]     HUB PROVIDED THE RELAY MESSAGE FROM THE OFFICE   PATIENT VOICED UNDERSTANDING AND HAS NO FURTHER QUESTIONS AT THIS TIME

## 2025-05-01 NOTE — TELEPHONE ENCOUNTER
Relay     Called to give results of Dexa per Dr Peralta,DEXA scan shows osteopenia but negative for osteoporosis.  Recommend daily over-the-counter vitamin D supplements with calcium, engaging in weightbearing exercises and avoiding smoking and excessive alcohol consumption  Recommend to take 1200 to 1500 mg of calcium and 800 to 1000 IU  of vitamin D daily

## 2025-05-30 DIAGNOSIS — R00.2 HEART PALPITATIONS: ICD-10-CM

## 2025-05-30 RX ORDER — NEBIVOLOL 2.5 MG/1
2.5 TABLET ORAL DAILY
Qty: 90 TABLET | Refills: 1 | Status: SHIPPED | OUTPATIENT
Start: 2025-05-30

## 2025-06-06 ENCOUNTER — OFFICE VISIT (OUTPATIENT)
Dept: ORTHOPEDIC SURGERY | Facility: CLINIC | Age: 75
End: 2025-06-06
Payer: MEDICARE

## 2025-06-06 VITALS
WEIGHT: 181 LBS | DIASTOLIC BLOOD PRESSURE: 83 MMHG | HEART RATE: 82 BPM | OXYGEN SATURATION: 96 % | SYSTOLIC BLOOD PRESSURE: 134 MMHG | BODY MASS INDEX: 30.9 KG/M2 | HEIGHT: 64 IN

## 2025-06-06 DIAGNOSIS — M25.552 LEFT HIP PAIN: Primary | ICD-10-CM

## 2025-06-06 DIAGNOSIS — M70.62 TROCHANTERIC BURSITIS OF LEFT HIP: ICD-10-CM

## 2025-06-06 DIAGNOSIS — M16.12 OSTEOARTHRITIS OF LEFT HIP, UNSPECIFIED OSTEOARTHRITIS TYPE: ICD-10-CM

## 2025-06-06 RX ORDER — LIDOCAINE HYDROCHLORIDE 10 MG/ML
5 INJECTION, SOLUTION INFILTRATION; PERINEURAL
Status: COMPLETED | OUTPATIENT
Start: 2025-06-06 | End: 2025-06-06

## 2025-06-06 RX ORDER — TRIAMCINOLONE ACETONIDE 40 MG/ML
40 INJECTION, SUSPENSION INTRA-ARTICULAR; INTRAMUSCULAR
Status: COMPLETED | OUTPATIENT
Start: 2025-06-06 | End: 2025-06-06

## 2025-06-06 RX ADMIN — LIDOCAINE HYDROCHLORIDE 5 ML: 10 INJECTION, SOLUTION INFILTRATION; PERINEURAL at 14:21

## 2025-06-06 RX ADMIN — TRIAMCINOLONE ACETONIDE 40 MG: 40 INJECTION, SUSPENSION INTRA-ARTICULAR; INTRAMUSCULAR at 14:21

## 2025-06-06 NOTE — PROGRESS NOTES
Chief Complaint  Initial Evaluation of the Left Hip     Subjective      Kristi Ibrahim presents to [facility] for initial evaluation of the left hip. She ambulates with a cane for support and stability.  She had a long drive in January and when she got there she could barely lift her leg to get in or out of the car.  She had an injection was done at the hospital in late January that helped some.  She has seen her back Dr and he noted it wasn't stemming from that.  She has pain on the lateral aspect of the hip that wraps around. She has Parkinson's.  She is in physical therapy and she is more stiff after therapy.        Allergies   Allergen Reactions    Nickel Rash    Rapeseed [Brassica Napus Seed Oil] Hives     Had to go to ER for treatment.    Ace Inhibitors Unknown (See Comments)     NOT  SURE    Acetaminophen Swelling    Brompheniramine Unknown (See Comments)     NOT SURE    Codeine Nausea And Vomiting    Diclofenac Urinary Retention     GI PAIN - ULCERS???    Flurbiprofen Unknown (See Comments)     NOT SURE    Hydrocodone Nausea Only and Nausea And Vomiting    Ibuprofen Unknown (See Comments)     NOT SURE    Indocyanine Green Unknown (See Comments)     NOT SURE    Pantoprazole Nausea And Vomiting    Propoxyphene Unknown (See Comments)     NOT SURE    Statins Other (See Comments)     tremors    Sulfamethoxazole Hives    Tramadol Other (See Comments)    Trimethoprim Hives    Vegetable Oil Hives    Wheat Other (See Comments)    Iodinated Contrast Media Rash    Iodine Itching and Rash        Social History     Socioeconomic History    Marital status:    Tobacco Use    Smoking status: Never    Smokeless tobacco: Never   Vaping Use    Vaping status: Never Used   Substance and Sexual Activity    Alcohol use: Never    Drug use: Never    Sexual activity: Not Currently     Partners: Male     Birth control/protection: Post-menopausal        I reviewed the patient's chief complaint,  "history of present illness, review of systems, past medical history, surgical history, family history, social history, medications, and allergy list.     Review of Systems     Constitutional: Denies fevers, chills, weight loss  Cardiovascular: Denies chest pain, shortness of breath  Skin: Denies rashes, acute skin changes  Neurologic: Denies headache, loss of consciousness        Vital Signs:   /83   Pulse 82   Ht 161.3 cm (63.5\")   Wt 82.1 kg (181 lb)   SpO2 96%   BMI 31.56 kg/m²          Physical Exam  General: Alert. No acute distress    Ortho Exam        LEFT HIP Decrease ROM of the left hip.   No skin discoloration, atrophy or swelling. Positive EHL, FHL, GS, and TA. Sensation intact to all 5 nerves of the foot. Pain with straight leg raise. Leg lengths appear equal. Ambulates with Antalgic gait  Knee Extensor Mechanism  intact   Tender over the greater trochanter.        Large Joint Arthrocentesis: L greater trochanteric bursa  Date/Time: 6/6/2025 2:21 PM  Consent given by: patient  Site marked: site marked  Timeout: Immediately prior to procedure a time out was called to verify the correct patient, procedure, equipment, support staff and site/side marked as required   Supporting Documentation  Indications: pain   Procedure Details  Location: hip - L greater trochanteric bursa  Preparation: Patient was prepped and draped in the usual sterile fashion  Needle gauge: 21 G.  Medications administered: 5 mL lidocaine 1 %; 40 mg triamcinolone acetonide 40 MG/ML  Patient tolerance: patient tolerated the procedure well with no immediate complications    This injection documentation was Scribed for Eduin Kingston MD by TAYLOR Jones.  06/06/25   14:22 EDT        Imaging Results (Most Recent)       Procedure Component Value Units Date/Time    XR Hip With or Without Pelvis 2 - 3 View Left - In process [[id number]] Resulted: 06/06/25 1331     Updated: 06/06/25 1335    This result has not been signed. " Information might be incomplete.               Result Review :     X-Ray Report:  Left hip X-Ray  Indication: Evaluation of the left hip   AP/Lateral view(s)  Findings: Mild to moderate degenerative changes of the left hip.   Prior studies available for comparison: no           Assessment and Plan     Diagnoses and all orders for this visit:    1. Left hip pain (Primary)  -     XR Hip With or Without Pelvis 2 - 3 View Left    2. Osteoarthritis of left hip, unspecified osteoarthritis type    3. Trochanteric bursitis of left hip        Discussed the treatment plan with the patient. I reviewed the X-rays that were obtained today with the patient.     Discussed the risks and benefits of conservative measures. The patient expressed understanding and wished to proceed with a left hip bursa steroid injection.  She tolerated the injection well.      Discussed with the patient that due to the steroid injection given today in the office they may see an increase in blood sugar for a few days. Advised patient to monitor sugar after receiving the injection.     Discussed possibility of a reaction from the injection.  Discussed the possibility that the injection may not completely improve or remove the pain.  Discussed the risk of infection.    HEP exercises given for left hip trochanteric hip bursitis.     Call or return if worsening symptoms.    Follow Up     4-6 weeks to assess if the injection was helpful.        Patient was given instructions and counseling regarding her condition or for health maintenance advice. Please see specific information pulled into the AVS if appropriate.     Scribed for Eduin Kingston MD by Heaven Murillo MA.  06/06/25   13:30 EDT    I have personally performed the services described in this document as scribed by the above individual and it is both accurate and complete. Eduin Kingston MD 06/06/25

## 2025-06-11 ENCOUNTER — TELEPHONE (OUTPATIENT)
Dept: FAMILY MEDICINE CLINIC | Facility: CLINIC | Age: 75
End: 2025-06-11

## 2025-06-11 DIAGNOSIS — E78.2 MIXED HYPERLIPIDEMIA: Primary | ICD-10-CM

## 2025-06-11 DIAGNOSIS — Z13.1 SCREENING FOR DIABETES MELLITUS: ICD-10-CM

## 2025-06-11 DIAGNOSIS — R00.2 HEART PALPITATIONS: ICD-10-CM

## 2025-06-11 DIAGNOSIS — Z13.29 SCREENING FOR THYROID DISORDER: ICD-10-CM

## 2025-06-11 NOTE — TELEPHONE ENCOUNTER
Caller: Kristi Ibrahim    Relationship: Self    Best call back number: [phone]    What orders are you requesting (i.e. lab or imaging): BLOOD WORK     In what timeframe would the patient need to come in: BEFORE APPOINTMENT 06.19.2025        Foot pain, left

## 2025-06-17 ENCOUNTER — LAB (OUTPATIENT)
Dept: LAB | Facility: HOSPITAL | Age: 75
End: 2025-06-17
Payer: MEDICARE

## 2025-06-17 DIAGNOSIS — Z13.1 SCREENING FOR DIABETES MELLITUS: ICD-10-CM

## 2025-06-17 DIAGNOSIS — Z13.29 SCREENING FOR THYROID DISORDER: ICD-10-CM

## 2025-06-17 DIAGNOSIS — R00.2 HEART PALPITATIONS: ICD-10-CM

## 2025-06-17 DIAGNOSIS — E78.49 OTHER HYPERLIPIDEMIA: ICD-10-CM

## 2025-06-17 DIAGNOSIS — I10 PRIMARY HYPERTENSION: ICD-10-CM

## 2025-06-17 LAB
ALBUMIN SERPL-MCNC: 4.3 G/DL (ref 3.5–5.2)
ALBUMIN/GLOB SERPL: 1.6 G/DL
ALP SERPL-CCNC: 73 U/L (ref 39–117)
ALT SERPL W P-5'-P-CCNC: 6 U/L (ref 1–33)
ANION GAP SERPL CALCULATED.3IONS-SCNC: 11.4 MMOL/L (ref 5–15)
AST SERPL-CCNC: 25 U/L (ref 1–32)
BASOPHILS # BLD AUTO: 0.04 10*3/MM3 (ref 0–0.2)
BASOPHILS NFR BLD AUTO: 0.7 % (ref 0–1.5)
BILIRUB CONJ SERPL-MCNC: 0.1 MG/DL (ref 0–0.3)
BILIRUB SERPL-MCNC: 0.3 MG/DL (ref 0–1.2)
BUN SERPL-MCNC: 11 MG/DL (ref 8–23)
BUN/CREAT SERPL: 14.3 (ref 7–25)
CALCIUM SPEC-SCNC: 9.6 MG/DL (ref 8.6–10.5)
CHLORIDE SERPL-SCNC: 104 MMOL/L (ref 98–107)
CHOLEST SERPL-MCNC: 189 MG/DL (ref 0–200)
CO2 SERPL-SCNC: 25.6 MMOL/L (ref 22–29)
CREAT SERPL-MCNC: 0.77 MG/DL (ref 0.57–1)
DEPRECATED RDW RBC AUTO: 43.9 FL (ref 37–54)
EGFRCR SERPLBLD CKD-EPI 2021: 80.6 ML/MIN/1.73
EOSINOPHIL # BLD AUTO: 0.16 10*3/MM3 (ref 0–0.4)
EOSINOPHIL NFR BLD AUTO: 2.9 % (ref 0.3–6.2)
ERYTHROCYTE [DISTWIDTH] IN BLOOD BY AUTOMATED COUNT: 13.2 % (ref 12.3–15.4)
GLOBULIN UR ELPH-MCNC: 2.7 GM/DL
GLUCOSE SERPL-MCNC: 87 MG/DL (ref 65–99)
HCT VFR BLD AUTO: 41.3 % (ref 34–46.6)
HDLC SERPL-MCNC: 75 MG/DL (ref 40–60)
HGB BLD-MCNC: 13.5 G/DL (ref 12–15.9)
IMM GRANULOCYTES # BLD AUTO: 0.01 10*3/MM3 (ref 0–0.05)
IMM GRANULOCYTES NFR BLD AUTO: 0.2 % (ref 0–0.5)
LDLC SERPL CALC-MCNC: 96 MG/DL (ref 0–100)
LDLC/HDLC SERPL: 1.25 {RATIO}
LYMPHOCYTES # BLD AUTO: 1.25 10*3/MM3 (ref 0.7–3.1)
LYMPHOCYTES NFR BLD AUTO: 22.6 % (ref 19.6–45.3)
MCH RBC QN AUTO: 29.5 PG (ref 26.6–33)
MCHC RBC AUTO-ENTMCNC: 32.7 G/DL (ref 31.5–35.7)
MCV RBC AUTO: 90.4 FL (ref 79–97)
MONOCYTES # BLD AUTO: 0.41 10*3/MM3 (ref 0.1–0.9)
MONOCYTES NFR BLD AUTO: 7.4 % (ref 5–12)
NEUTROPHILS NFR BLD AUTO: 3.67 10*3/MM3 (ref 1.7–7)
NEUTROPHILS NFR BLD AUTO: 66.2 % (ref 42.7–76)
NRBC BLD AUTO-RTO: 0 /100 WBC (ref 0–0.2)
PLATELET # BLD AUTO: 167 10*3/MM3 (ref 140–450)
PMV BLD AUTO: 11.4 FL (ref 6–12)
POTASSIUM SERPL-SCNC: 4.1 MMOL/L (ref 3.5–5.2)
PROT SERPL-MCNC: 7 G/DL (ref 6–8.5)
RBC # BLD AUTO: 4.57 10*6/MM3 (ref 3.77–5.28)
SODIUM SERPL-SCNC: 141 MMOL/L (ref 136–145)
TRIGL SERPL-MCNC: 102 MG/DL (ref 0–150)
TSH SERPL DL<=0.05 MIU/L-ACNC: 2.28 UIU/ML (ref 0.27–4.2)
VLDLC SERPL-MCNC: 18 MG/DL (ref 5–40)
WBC NRBC COR # BLD AUTO: 5.54 10*3/MM3 (ref 3.4–10.8)

## 2025-06-17 PROCEDURE — 84443 ASSAY THYROID STIM HORMONE: CPT

## 2025-06-17 PROCEDURE — 80061 LIPID PANEL: CPT

## 2025-06-17 PROCEDURE — 82248 BILIRUBIN DIRECT: CPT

## 2025-06-17 PROCEDURE — 85025 COMPLETE CBC W/AUTO DIFF WBC: CPT

## 2025-06-17 PROCEDURE — 80053 COMPREHEN METABOLIC PANEL: CPT

## 2025-06-17 PROCEDURE — 36415 COLL VENOUS BLD VENIPUNCTURE: CPT

## 2025-06-19 ENCOUNTER — OFFICE VISIT (OUTPATIENT)
Dept: FAMILY MEDICINE CLINIC | Facility: CLINIC | Age: 75
End: 2025-06-19
Payer: MEDICARE

## 2025-06-19 ENCOUNTER — OFFICE VISIT (OUTPATIENT)
Dept: CARDIOLOGY | Facility: CLINIC | Age: 75
End: 2025-06-19
Payer: MEDICARE

## 2025-06-19 VITALS
BODY MASS INDEX: 30.08 KG/M2 | WEIGHT: 176.2 LBS | HEART RATE: 82 BPM | HEIGHT: 64 IN | DIASTOLIC BLOOD PRESSURE: 72 MMHG | SYSTOLIC BLOOD PRESSURE: 142 MMHG

## 2025-06-19 VITALS
HEIGHT: 64 IN | WEIGHT: 177.4 LBS | SYSTOLIC BLOOD PRESSURE: 147 MMHG | OXYGEN SATURATION: 98 % | TEMPERATURE: 97.6 F | BODY MASS INDEX: 30.29 KG/M2 | HEART RATE: 84 BPM | DIASTOLIC BLOOD PRESSURE: 84 MMHG

## 2025-06-19 DIAGNOSIS — Z91.81 H/O FALL: ICD-10-CM

## 2025-06-19 DIAGNOSIS — I25.10 CAD S/P PERCUTANEOUS CORONARY ANGIOPLASTY: Primary | ICD-10-CM

## 2025-06-19 DIAGNOSIS — R42 DIZZINESS: ICD-10-CM

## 2025-06-19 DIAGNOSIS — R51.9 FREQUENT HEADACHES: Primary | ICD-10-CM

## 2025-06-19 DIAGNOSIS — Z78.9 STATIN INTOLERANCE: ICD-10-CM

## 2025-06-19 DIAGNOSIS — Z98.61 CAD S/P PERCUTANEOUS CORONARY ANGIOPLASTY: Primary | ICD-10-CM

## 2025-06-19 DIAGNOSIS — I10 PRIMARY HYPERTENSION: ICD-10-CM

## 2025-06-19 DIAGNOSIS — E78.49 OTHER HYPERLIPIDEMIA: ICD-10-CM

## 2025-06-19 NOTE — PROGRESS NOTES
Chief Complaint  Hypertension (Follow up), Balance Issues, and Fall (Back in March )    Subjective      Kristi Ibrahim is a 75 y.o. female who presents to [facility]     History of Present Illness  The patient presents for evaluation of balance issues, headaches, and elevated blood pressure.    She reports a change in her head position, which has shifted to the right. This was first noticed by her neurologist during a visit with her sister and later by her ophthalmologist. She recalls a fall in 03/2025, during which she hit her head on a porch post, resulting in a laceration but no loss of consciousness. No imaging was performed at that time. Since the fall, she has experienced persistent balance issues, necessitating the use of a cane for mobility. She also reports an increase in headaches, which typically originate from the back of her head. She has a history of vertigo, but the current symptoms are more severe and differ from previous episodes. She is unable to perform certain yoga exercises due to dizziness.     She has a history of C5-C6 vertebral issues, confirmed by imaging a few years ago. Her tremors have improved with LifeWave patches, but she experiences jerky movements with carbidopa-levodopa, which she has been taking sparingly. She does not want to go back on amantadine.    She is currently on Bystolic for blood pressure management. Her blood pressure readings are typically in the high 120s or low 130s. She believes her blood pressure may be elevated today due to recent yogurt consumption.    She has been experiencing more frequent headaches, which typically start in the back and radiate upwards.     She used to go to a urogynecologist in [facility] who was inserting a pessary that was quite large and she could not remove it herself. Her physical therapist told her about another pessary that she could remove herself and it worked well.          Patient Care Team:  Kathryn  "MD Micaela as PCP - General (Family Medicine)    Review of Systems      Objective   Vital Signs:   Vitals:    06/19/25 1452   BP: 147/84   BP Location: Left arm   Patient Position: Sitting   Cuff Size: Adult   Pulse: 84   Temp: 97.6 °F (36.4 °C)   TempSrc: Temporal   SpO2: 98%   Weight: 80.5 kg (177 lb 6.4 oz)   Height: 161.3 cm (63.5\")     Body mass index is 30.93 kg/m².    Wt Readings from Last 3 Encounters:   06/19/25 80.5 kg (177 lb 6.4 oz)   06/19/25 79.9 kg (176 lb 3.2 oz)   06/06/25 82.1 kg (181 lb)     BP Readings from Last 3 Encounters:   06/19/25 147/84   06/19/25 142/72   06/06/25 134/83       Health Maintenance   Topic Date Due    COVID-19 Vaccine (6 - 2024-25 season) 07/03/2025 (Originally 9/1/2024)    ZOSTER VACCINE (1 of 2) 07/03/2025 (Originally 5/21/2000)    Pneumococcal Vaccine 50+ (2 of 2 - PPSV23) 10/18/2025 (Originally 11/16/2017)    TDAP/TD VACCINES (1 - Tdap) 10/18/2025 (Originally 5/21/1969)    RSV Vaccine - Adults (1 - 1-dose 75+ series) 06/19/2026 (Originally 5/21/2025)    INFLUENZA VACCINE  07/01/2025    ANNUAL WELLNESS VISIT  10/21/2025    LIPID PANEL  06/17/2026    DXA SCAN  04/30/2027    COLORECTAL CANCER SCREENING  01/11/2033    HEPATITIS C SCREENING  Completed    MAMMOGRAM  Discontinued       Physical Exam  Constitutional:       Appearance: Normal appearance.   HENT:      Head: Normocephalic and atraumatic.   Cardiovascular:      Rate and Rhythm: Normal rate and regular rhythm.      Pulses: Normal pulses.      Heart sounds: Normal heart sounds.   Pulmonary:      Effort: Pulmonary effort is normal.      Breath sounds: Normal breath sounds.   Neurological:      General: No focal deficit present.      Mental Status: She is alert and oriented to person, place, and time.      Motor: Weakness present.      Coordination: Coordination normal.      Comments: Mild decreased strength in left sternocleidomastoid muscle on cranial nerve examination, but remaining cranial nerve examination is " "normal bilaterally  Head appears slightly deviated towards right  Parkinson's tremor noted   Psychiatric:         Mood and Affect: Mood normal.         Behavior: Behavior normal.         Physical Exam  Neurological: Awake, alert, oriented x4, no focal deficit  Head: Normocephalic, atraumatic  Neck: Supple, no abnormalities  Musculoskeletal: No joint or muscular abnormalities noted       Result Review   The following data was reviewed by: Micaela Peralta MD on 06/19/2025:  [x]  Tests & Results  []  Hospitalization/Emergency Department/Urgent Care  []  Internal/External Consultant Notes      Results  Labs   - Lipid Panel: HDL is high. LDL is 96.       ASSESSMENT/PLAN  Diagnoses and all orders for this visit:    1. Frequent headaches (Primary)  -     CT Head Without Contrast; Future  -     CT Cervical Spine Without Contrast; Future    2. Dizziness  -     CT Head Without Contrast; Future  -     CT Cervical Spine Without Contrast; Future    3. H/O fall  -     CT Head Without Contrast; Future  -     CT Cervical Spine Without Contrast; Future      Assessment & Plan  1. Balance issues.  - Persistent balance issues since the fall in 03/2025, with no improvement.  - Increased dizziness and frequent headaches starting at the back of the head.  - Physical exam reveals weakness on the left side of the neck   - CT scan of the head and upper cervical spine has been ordered.    2. Elevated blood pressure.  - Blood pressure slightly elevated, likely due to pain.  - Currently taking Bystolic for blood pressure management.  - Blood pressure usually in the high 120s or low 130s.  - No additional blood pressure medication recommended at this time.    3. Headaches.  - Frequent headaches starting at the back of the head.  - Increased dizziness and visual disturbances described as \"fractured glass bubbles.\"  - CT scan of the head and upper cervical spine has been ordered.    4. Parkinson's disease.  - Neurologist and eye doctor noted head " tilting to the right, further evaluation needed.    Follow-up in 3 months.               Kristi Ibrahim  reports that she has never smoked. She has never used smokeless tobacco.        FOLLOW UP  Return in about 3 months (around 9/19/2025).  Patient was given instructions and counseling regarding her condition or for health maintenance advice. Please see specific information pulled into the AVS if appropriate.       Micaela Peralta MD  06/19/25  15:47 EDT    Patient or patient representative verbalized consent for the use of Ambient Listening during the visit with  Micaela Peralta MD for chart documentation. 6/19/2025  15:47 EDT        Answers submitted by the patient for this visit:  Problem not listed (Submitted on 6/18/2025)  Chief Complaint: Other medical problem  Reason for appointment: Follow up appointment  joint pain: Yes  joint swelling: Yes  Onset: 1 to 6 months  Chronicity: recurrent  Frequency: daily  Medications tried: Pain creams/patches, baclofen, physical therapy, chair yoga, Journax

## 2025-06-19 NOTE — PROGRESS NOTES
Chief Complaint  Coronary Artery Disease, Follow-up, Hypertension, and Hyperlipidemia    Subjective    Patient is doing well not been experiencing any anginal chest pain she is currently doing chair yoga to stay physically active as well as physical therapy is limited due to hip pain problems.  Reports no changes in her overall breathing ability  Past Medical History:   Diagnosis Date    Anemia, unspecified     Arthritis 02/14/2022    Arthritis of neck     Asthma 02/14/2022    Cataract     Cervical disc disorder     Colon polyp     Coronary artery disease     Dental disease     Diverticulosis     Dizziness     Fibromyalgia     GERD (gastroesophageal reflux disease)     H/O degenerative disc disease     Headache     Heart valve disease     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    Hepatitis A     HL (hearing loss)     Hypertension     Knee swelling     Long-term current use of opiate analgesic 07/08/2019    Lumbar herniated disc     Lumbar stenosis 09/10/2019    Muscle cramps     Osteopenia     Other hyperlipidemia 08/01/2022    Parkinson's disease     Scoliosis     Seasonal allergic rhinitis     Shortness of breath     Sinusitis     Spondylolisthesis of cervical region 09/10/2019    Spondylolisthesis of lumbar region 08/25/2020    Tremor     Urinary tract infection     Voice hoarseness          Current Outpatient Medications:     albuterol sulfate  (90 Base) MCG/ACT inhaler, Inhale 2 puffs Every 4 (Four) Hours As Needed for Wheezing., Disp: 18 g, Rfl: 11    aspirin 81 MG tablet, Take 1 tablet by mouth Every Other Day., Disp: , Rfl:     baclofen (LIORESAL) 10 MG tablet, Take 1 tablet by mouth 3 (Three) Times a Day. (Patient taking differently: Take 1 tablet by mouth As Needed.), Disp: , Rfl:     carbidopa-levodopa (SINEMET)  MG per tablet, Take 1.5 tablets by mouth 3 (Three) Times a Day., Disp: , Rfl:     CITRUS BERGAMOT PO, Take 500 mg by mouth 2 (Two) Times a Day., Disp: , Rfl:     magnesium oxide  (MAG-OX) 400 MG tablet, Take 1 tablet by mouth 2 (Two) Times a Day., Disp: , Rfl:     meclizine (ANTIVERT) 25 MG tablet, Take 1 tablet by mouth Daily., Disp: , Rfl:     Multiple Vitamins-Minerals (HAIR SKIN AND NAILS FORMULA PO), Take 1 tablet by mouth Daily., Disp: , Rfl:     nebivolol (BYSTOLIC) 2.5 MG tablet, Take 1 tablet by mouth Daily., Disp: 90 tablet, Rfl: 1    nitroglycerin (NITROSTAT) 0.4 MG SL tablet, Place 1 tablet under the tongue Every 5 (Five) Minutes As Needed for Chest Pain., Disp: 25 tablet, Rfl: 5    Omega-3 Fatty Acids (fish oil) 1000 MG capsule capsule, Take 1 capsule by mouth 2 (Two) Times a Day With Meals., Disp: 180 capsule, Rfl: 3    ondansetron (ZOFRAN) 4 MG tablet, , Disp: , Rfl:     Probiotic Product (SOLUBLE FIBER/PROBIOTICS PO), Take 1 tablet by mouth Daily., Disp: , Rfl:     rOPINIRole (REQUIP) 0.5 MG tablet, Take 1-2 tablets by mouth Every Night. Take 1 hour before bedtime., Disp: 180 tablet, Rfl: 1    SUMAtriptan (IMITREX) 50 MG tablet, Take one tablet at onset of headache. May repeat dose one time in 2 hours if headache not relieved., Disp: 9 tablet, Rfl: 5    Suzetrigine (Journavx) 50 MG tablet, Take 1 tablet by mouth 2 (Two) Times a Day., Disp: 60 tablet, Rfl: 1    Vibegron 75 MG tablet, Take 1 tablet by mouth Daily., Disp: 30 tablet, Rfl: 0    There are no discontinued medications.  Allergies   Allergen Reactions    Nickel Rash    Rapeseed [Brassica Napus Seed Oil] Hives     Had to go to ER for treatment.    Ace Inhibitors Unknown (See Comments)     NOT  SURE    Acetaminophen Swelling    Brompheniramine Unknown (See Comments)     NOT SURE    Codeine Nausea And Vomiting    Diclofenac Urinary Retention     GI PAIN - ULCERS???    Flurbiprofen Unknown (See Comments)     NOT SURE    Hydrocodone Nausea Only and Nausea And Vomiting    Ibuprofen Unknown (See Comments)     NOT SURE    Indocyanine Green Unknown (See Comments)     NOT SURE    Pantoprazole Nausea And Vomiting    Propoxyphene  "Unknown (See Comments)     NOT SURE    Statins Other (See Comments)     tremors    Sulfamethoxazole Hives    Tramadol Other (See Comments)    Trimethoprim Hives    Vegetable Oil Hives    Wheat Other (See Comments)    Iodinated Contrast Media Rash    Iodine Itching and Rash        Social History     Tobacco Use    Smoking status: Never    Smokeless tobacco: Never   Vaping Use    Vaping status: Never Used   Substance Use Topics    Alcohol use: Never    Drug use: Never       Family History   Problem Relation Age of Onset    Heart disease Mother     Stroke Mother     Arthritis Mother     Osteoarthritis Mother     Heart disease Father     Hypertension Father     Arthritis Father     Stomach cancer Father     Rheum arthritis Father     Vision loss Father         Glaucoma    Heart disease Brother     Hypertension Brother     Arthritis Brother     Breast cancer Sister     Arthritis Sister     Cancer Sister         Breast    Early death Sister         Breast cancer    Cancer Maternal Grandmother         Lung    Cancer Sister         Breast    Diabetes Brother     Hypertension Brother     Heart disease Brother     Hypertension Brother     Arthritis Brother     Heart disease Brother     Hypertension Brother     Diabetes Brother     Heart disease Brother     Hypertension Brother     Cancer Sister         Breast    Hypertension Son     Cancer Sister         Breast        Objective     /72   Pulse 82   Ht 161.3 cm (63.5\")   Wt 79.9 kg (176 lb 3.2 oz)   BMI 30.72 kg/m²       Physical Exam    General Appearance:   no acute distress  Alert and oriented x3  HENT:   lips not cyanotic  Atraumatic  Neck:  No jvd   supple  Respiratory:  no respiratory distress  normal breath sounds  no rales  Cardiovascular:  Regular rate and rhythm  no S3, no S4   no murmur  no rub  Extremities  No cyanosis  lower extremity edema: none    Skin:   warm, dry  No rashes      Result Review :     proBNP   Date Value Ref Range Status   05/25/2022 " "1,008.0 (H) 0.0 - 900.0 pg/mL Final     CMP          11/19/2024    09:59 3/18/2025    09:51 6/17/2025    09:52   CMP   Glucose 92   87    BUN 9   11.0    Creatinine 0.82   0.77    EGFR 75.2   80.6    Sodium 142   141    Potassium 4.1   4.1    Chloride 104   104    Calcium 9.6   9.6    Total Protein 7.2  7.0  7.0    Albumin 4.0  3.9  4.3    Globulin 3.2   2.7    Total Bilirubin 0.3  0.4  0.3    Alkaline Phosphatase 82  75  73    AST (SGOT) 21  23  25    ALT (SGPT) 8  5  6    Albumin/Globulin Ratio 1.3   1.6    BUN/Creatinine Ratio 11.0   14.3    Anion Gap 10.9   11.4      CBC w/diff          11/19/2024    09:59 6/17/2025    09:52   CBC w/Diff   WBC 4.33  5.54    RBC 4.46  4.57    Hemoglobin 13.1  13.5    Hematocrit 39.8  41.3    MCV 89.2  90.4    MCH 29.4  29.5    MCHC 32.9  32.7    RDW 13.3  13.2    Platelets 158  167    Neutrophil Rel %  66.2    Immature Granulocyte Rel %  0.2    Lymphocyte Rel %  22.6    Monocyte Rel %  7.4    Eosinophil Rel %  2.9    Basophil Rel %  0.7       Lab Results   Component Value Date    TSH 2.280 06/17/2025      Lab Results   Component Value Date    FREET4 1.37 12/07/2022      No results found for: \"DDIMERQUANT\"  No results found for: \"MG\"   No results found for: \"DIGOXIN\"   No results found for: \"TROPONINT\"        Lipid Panel          11/19/2024    09:59 3/18/2025    09:51 6/17/2025    09:52   Lipid Panel   Total Cholesterol 198  185  189    Triglycerides 102  94  102    HDL Cholesterol 72  70  75    VLDL Cholesterol 18  17  18    LDL Cholesterol  108  98  96    LDL/HDL Ratio 1.47  1.37  1.25      No results found for: \"POCTROP\"    Results for orders placed during the hospital encounter of 04/07/23    Adult Transthoracic Echo Complete W/ Cont if Necessary Per Protocol    Interpretation Summary  Normal left ventricular systolic function.  Mild MR and mild TR.                 Diagnoses and all orders for this visit:    1. CAD S/P percutaneous coronary angioplasty (Primary)  Assessment & " Plan:  Patient is doing well no ongoing angina continue with chronic aspirin 81 mg daily        2. Primary hypertension  Assessment & Plan:  Patient reports blood pressure at home ranging 130s to 120s range systolically continue on Bystolic 2.5 mg once a day encourage monitoring with a home log       3. Other hyperlipidemia  Assessment & Plan:  Patient has been intolerant to statins her LDL level is within normal range however did still talk to her she could still benefit from cardiovascular risk reduction with addition of PSK 9 inhibitor she was not wanting to add any more medications for treatment       4. Statin intolerance            Follow Up     Return in about 6 months (around 12/19/2025) for Follow with Shira Nguyễn, EKG with F/U.          Patient was given instructions and counseling regarding her condition or for health maintenance advice. Please see specific information pulled into the AVS if appropriate.

## 2025-06-19 NOTE — ASSESSMENT & PLAN NOTE
Patient has been intolerant to statins her LDL level is within normal range however did still talk to her she could still benefit from cardiovascular risk reduction with addition of PSK 9 inhibitor she was not wanting to add any more medications for treatment

## 2025-06-19 NOTE — ASSESSMENT & PLAN NOTE
Patient reports blood pressure at home ranging 130s to 120s range systolically continue on Bystolic 2.5 mg once a day encourage monitoring with a home log

## 2025-06-26 ENCOUNTER — HOSPITAL ENCOUNTER (OUTPATIENT)
Dept: CT IMAGING | Facility: HOSPITAL | Age: 75
Discharge: HOME | End: 2025-06-26
Admitting: STUDENT IN AN ORGANIZED HEALTH CARE EDUCATION/TRAINING PROGRAM
Payer: MEDICARE

## 2025-06-26 DIAGNOSIS — Z91.81 H/O FALL: ICD-10-CM

## 2025-06-26 DIAGNOSIS — R42 DIZZINESS: ICD-10-CM

## 2025-06-26 DIAGNOSIS — R51.9 FREQUENT HEADACHES: ICD-10-CM

## 2025-06-26 PROCEDURE — 72125 CT NECK SPINE W/O DYE: CPT

## 2025-06-26 PROCEDURE — 70450 CT HEAD/BRAIN W/O DYE: CPT

## 2025-07-03 ENCOUNTER — TELEPHONE (OUTPATIENT)
Dept: FAMILY MEDICINE CLINIC | Facility: CLINIC | Age: 75
End: 2025-07-03
Payer: MEDICARE

## 2025-07-03 NOTE — TELEPHONE ENCOUNTER
PT sees a spine specialist Tera Jane- [facility].    And would like the referral to go to his practice.      [phone]

## (undated) DEVICE — PK CATH CARD 40

## (undated) DEVICE — GW EMR FIX EXCHG J STD .035 3MM 260CM

## (undated) DEVICE — CATH DIAG IMPULSE PIG 5F 100CM

## (undated) DEVICE — CATH DIAG IMPULSE FL3.5 5F 100CM

## (undated) DEVICE — CATH DIAG IMPULSE FR4 5F 100CM

## (undated) DEVICE — KT MANIFLD CARDIAC

## (undated) DEVICE — GLIDESHEATH BASIC HYDROPHILIC COATED INTRODUCER SHEATH: Brand: GLIDESHEATH